# Patient Record
Sex: FEMALE | Race: OTHER | Employment: FULL TIME | URBAN - NONMETROPOLITAN AREA
[De-identification: names, ages, dates, MRNs, and addresses within clinical notes are randomized per-mention and may not be internally consistent; named-entity substitution may affect disease eponyms.]

---

## 2017-03-10 ENCOUNTER — OFFICE VISIT (OUTPATIENT)
Dept: URGENT CARE | Age: 27
End: 2017-03-10
Payer: MEDICAID

## 2017-03-10 VITALS
OXYGEN SATURATION: 98 % | WEIGHT: 225 LBS | DIASTOLIC BLOOD PRESSURE: 80 MMHG | RESPIRATION RATE: 20 BRPM | SYSTOLIC BLOOD PRESSURE: 119 MMHG | BODY MASS INDEX: 36.32 KG/M2 | HEART RATE: 73 BPM | TEMPERATURE: 97.6 F

## 2017-03-10 DIAGNOSIS — K52.9 GASTROENTERITIS: Primary | ICD-10-CM

## 2017-03-10 PROCEDURE — 99213 OFFICE O/P EST LOW 20 MIN: CPT | Performed by: NURSE PRACTITIONER

## 2017-03-10 RX ORDER — DICYCLOMINE HYDROCHLORIDE 10 MG/1
10 CAPSULE ORAL 3 TIMES DAILY PRN
Qty: 120 CAPSULE | Refills: 3 | Status: SHIPPED | OUTPATIENT
Start: 2017-03-10 | End: 2019-05-26 | Stop reason: ALTCHOICE

## 2017-03-10 RX ORDER — FLUOXETINE HYDROCHLORIDE 20 MG/1
CAPSULE ORAL
Status: ON HOLD | COMMUNITY
Start: 2017-02-28 | End: 2019-06-10 | Stop reason: HOSPADM

## 2017-03-10 RX ORDER — ONDANSETRON 2 MG/ML
4 INJECTION INTRAMUSCULAR; INTRAVENOUS ONCE
Status: COMPLETED | OUTPATIENT
Start: 2017-03-10 | End: 2017-03-10

## 2017-03-10 RX ORDER — ONDANSETRON HYDROCHLORIDE 8 MG/1
8 TABLET, FILM COATED ORAL EVERY 8 HOURS PRN
Qty: 9 TABLET | Refills: 1 | Status: SHIPPED | OUTPATIENT
Start: 2017-03-10 | End: 2017-03-13

## 2017-03-10 RX ADMIN — ONDANSETRON 4 MG: 2 INJECTION INTRAMUSCULAR; INTRAVENOUS at 09:42

## 2017-03-10 ASSESSMENT — ENCOUNTER SYMPTOMS
NAUSEA: 1
DIARRHEA: 1
SORE THROAT: 1
ABDOMINAL PAIN: 1
COUGH: 1
VOMITING: 1

## 2017-04-29 ENCOUNTER — HOSPITAL ENCOUNTER (EMERGENCY)
Facility: HOSPITAL | Age: 27
Discharge: HOME | End: 2017-04-30
Attending: EMERGENCY MEDICINE | Admitting: EMERGENCY MEDICINE

## 2017-04-29 DIAGNOSIS — K52.9 GASTROENTERITIS: Primary | ICD-10-CM

## 2017-04-29 DIAGNOSIS — N39.0 ACUTE UTI (URINARY TRACT INFECTION): ICD-10-CM

## 2017-04-29 PROCEDURE — 99283 EMERGENCY DEPT VISIT LOW MDM: CPT

## 2017-04-29 RX ORDER — FLUOXETINE HYDROCHLORIDE 20 MG/1
40 CAPSULE ORAL
COMMUNITY
Start: 2017-02-28

## 2017-04-30 VITALS
WEIGHT: 220 LBS | HEART RATE: 67 BPM | HEIGHT: 67 IN | SYSTOLIC BLOOD PRESSURE: 101 MMHG | OXYGEN SATURATION: 99 % | BODY MASS INDEX: 34.53 KG/M2 | RESPIRATION RATE: 17 BRPM | DIASTOLIC BLOOD PRESSURE: 68 MMHG | TEMPERATURE: 98 F

## 2017-04-30 LAB
ALBUMIN SERPL-MCNC: 4.5 G/DL (ref 3.5–5)
ALBUMIN/GLOB SERPL: 1.1 G/DL (ref 1.1–2.5)
ALP SERPL-CCNC: 79 U/L (ref 24–120)
ALT SERPL W P-5'-P-CCNC: 19 U/L (ref 0–54)
ANION GAP SERPL CALCULATED.3IONS-SCNC: 15 MMOL/L (ref 4–13)
AST SERPL-CCNC: 24 U/L (ref 7–45)
BACTERIA UR QL AUTO: ABNORMAL /HPF
BASOPHILS # BLD AUTO: 0.02 10*3/MM3 (ref 0–0.2)
BASOPHILS NFR BLD AUTO: 0.2 % (ref 0–2)
BILIRUB SERPL-MCNC: 0.6 MG/DL (ref 0.1–1)
BILIRUB UR QL STRIP: NEGATIVE
BUN BLD-MCNC: 14 MG/DL (ref 5–21)
BUN/CREAT SERPL: 16.9 (ref 7–25)
CALCIUM SPEC-SCNC: 9.4 MG/DL (ref 8.4–10.4)
CHLORIDE SERPL-SCNC: 106 MMOL/L (ref 98–110)
CLARITY UR: CLEAR
CO2 SERPL-SCNC: 20 MMOL/L (ref 24–31)
COLOR UR: ABNORMAL
CREAT BLD-MCNC: 0.83 MG/DL (ref 0.5–1.4)
DEPRECATED RDW RBC AUTO: 42.3 FL (ref 40–54)
EOSINOPHIL # BLD AUTO: 0.35 10*3/MM3 (ref 0–0.7)
EOSINOPHIL NFR BLD AUTO: 3.2 % (ref 0–4)
ERYTHROCYTE [DISTWIDTH] IN BLOOD BY AUTOMATED COUNT: 14.6 % (ref 12–15)
FLUAV AG NPH QL: NEGATIVE
FLUBV AG NPH QL IA: NEGATIVE
GFR SERPL CREATININE-BSD FRML MDRD: 84 ML/MIN/1.73
GLOBULIN UR ELPH-MCNC: 4 GM/DL
GLUCOSE BLD-MCNC: 94 MG/DL (ref 70–100)
GLUCOSE UR STRIP-MCNC: NEGATIVE MG/DL
HCG SERPL QL: NEGATIVE
HCT VFR BLD AUTO: 38 % (ref 37–47)
HGB BLD-MCNC: 12.9 G/DL (ref 12–16)
HGB UR QL STRIP.AUTO: NEGATIVE
HYALINE CASTS UR QL AUTO: ABNORMAL /LPF
IMM GRANULOCYTES # BLD: 0.02 10*3/MM3 (ref 0–0.03)
IMM GRANULOCYTES NFR BLD: 0.2 % (ref 0–5)
KETONES UR QL STRIP: ABNORMAL
LEUKOCYTE ESTERASE UR QL STRIP.AUTO: ABNORMAL
LYMPHOCYTES # BLD AUTO: 1.95 10*3/MM3 (ref 0.72–4.86)
LYMPHOCYTES NFR BLD AUTO: 17.6 % (ref 15–45)
MCH RBC QN AUTO: 27.2 PG (ref 28–32)
MCHC RBC AUTO-ENTMCNC: 33.9 G/DL (ref 33–36)
MCV RBC AUTO: 80 FL (ref 82–98)
MONOCYTES # BLD AUTO: 0.46 10*3/MM3 (ref 0.19–1.3)
MONOCYTES NFR BLD AUTO: 4.2 % (ref 4–12)
NEUTROPHILS # BLD AUTO: 8.26 10*3/MM3 (ref 1.87–8.4)
NEUTROPHILS NFR BLD AUTO: 74.6 % (ref 39–78)
NITRITE UR QL STRIP: NEGATIVE
PH UR STRIP.AUTO: 5.5 [PH] (ref 5–8)
PLATELET # BLD AUTO: 250 10*3/MM3 (ref 130–400)
PMV BLD AUTO: 11.3 FL (ref 6–12)
POTASSIUM BLD-SCNC: 4 MMOL/L (ref 3.5–5.3)
PROT SERPL-MCNC: 8.5 G/DL (ref 6.3–8.7)
PROT UR QL STRIP: NEGATIVE
RBC # BLD AUTO: 4.75 10*6/MM3 (ref 4.2–5.4)
RBC # UR: ABNORMAL /HPF
REF LAB TEST METHOD: ABNORMAL
SODIUM BLD-SCNC: 141 MMOL/L (ref 135–145)
SP GR UR STRIP: >1.03 (ref 1–1.03)
SQUAMOUS #/AREA URNS HPF: ABNORMAL /HPF
UROBILINOGEN UR QL STRIP: ABNORMAL
WBC NRBC COR # BLD: 11.06 10*3/MM3 (ref 4.8–10.8)
WBC UR QL AUTO: ABNORMAL /HPF

## 2017-04-30 PROCEDURE — 96360 HYDRATION IV INFUSION INIT: CPT

## 2017-04-30 PROCEDURE — 85025 COMPLETE CBC W/AUTO DIFF WBC: CPT | Performed by: EMERGENCY MEDICINE

## 2017-04-30 PROCEDURE — 87804 INFLUENZA ASSAY W/OPTIC: CPT | Performed by: EMERGENCY MEDICINE

## 2017-04-30 PROCEDURE — 84703 CHORIONIC GONADOTROPIN ASSAY: CPT | Performed by: EMERGENCY MEDICINE

## 2017-04-30 PROCEDURE — 80053 COMPREHEN METABOLIC PANEL: CPT | Performed by: EMERGENCY MEDICINE

## 2017-04-30 PROCEDURE — 81001 URINALYSIS AUTO W/SCOPE: CPT | Performed by: EMERGENCY MEDICINE

## 2017-04-30 PROCEDURE — 87086 URINE CULTURE/COLONY COUNT: CPT | Performed by: EMERGENCY MEDICINE

## 2017-04-30 RX ORDER — ONDANSETRON 4 MG/1
4 TABLET, FILM COATED ORAL EVERY 6 HOURS
Qty: 15 TABLET | Refills: 0 | Status: SHIPPED | OUTPATIENT
Start: 2017-04-30 | End: 2017-06-19

## 2017-04-30 RX ORDER — CIPROFLOXACIN 250 MG/1
250 TABLET, FILM COATED ORAL 2 TIMES DAILY
Qty: 6 TABLET | Refills: 0 | Status: SHIPPED | OUTPATIENT
Start: 2017-04-30 | End: 2017-05-03

## 2017-04-30 RX ADMIN — SODIUM CHLORIDE 1000 ML: 9 INJECTION, SOLUTION INTRAVENOUS at 01:40

## 2017-05-02 LAB — BACTERIA SPEC AEROBE CULT: ABNORMAL

## 2017-07-20 ENCOUNTER — HOSPITAL ENCOUNTER (OUTPATIENT)
Dept: PHYSICAL THERAPY | Facility: HOSPITAL | Age: 27
Setting detail: THERAPIES SERIES
Discharge: HOME | End: 2017-07-20

## 2017-07-20 DIAGNOSIS — M25.511 ACUTE PAIN OF RIGHT SHOULDER: ICD-10-CM

## 2017-07-20 DIAGNOSIS — S46.911A RIGHT SHOULDER STRAIN, INITIAL ENCOUNTER: Primary | ICD-10-CM

## 2017-07-20 PROCEDURE — 97161 PT EVAL LOW COMPLEX 20 MIN: CPT | Performed by: PHYSICAL THERAPIST

## 2017-07-20 NOTE — THERAPY EVALUATION
Outpatient Physical Therapy Ortho Initial Evaluation  [facility]     Patient Name: Allyssa Holden  : 1990  MRN: [id number]  Today's Date: 2017      Visit Date: 2017    There is no problem list on file for this patient.       Past Medical History:   Diagnosis Date   • ADD (attention deficit disorder)    • Anxiety    • Dementia    • Hypertension    • PTSD (post-traumatic stress disorder)         Past Surgical History:   Procedure Laterality Date   • APPENDECTOMY     •  SECTION         Visit Dx:     ICD-10-CM ICD-9-CM   1. Right shoulder strain, initial encounter S46.911A 840.9   2. Acute pain of right shoulder M25.511 719.41             Patient History       17 1025          History    Chief Complaint Pain;Joint stiffness  -KR      Type of Pain Shoulder pain   Right  -KR      Date Current Problem(s) Began 17  -KR      Brief Description of Current Complaint She reports this is the 2nd time she has hurt her R shoulder; went thru PT for 6 weeks and had MRI, but didn't hear the results. Had intermittent issues. She reports a car had broke down in the her work parking lot, she helped push the car and hurt her shoulder. She reports going to the MD the next day. She reports R shoulder pain right around the joint. She reports trying to move her arm, and they gave her a sling to wear, but cause a heat rash on her stomach. She reports doing exercises from last PT.   -KR      Previous treatment for THIS PROBLEM Rehabilitation  -KR      Patient/Caregiver Goals Relieve pain;Return to prior level of function;Improve mobility  -KR      Patient's Rating of General Health Good  -KR      Hand Dominance right-handed  -KR      Occupation/sports/leisure activities  at [employer] station  -KR      How has patient tried to help current problem? ice/heat/exercises, movement  -KR      What clinical tests have you had for this problem? --   prior MRI over 1 year ago  -KR      Results  of Clinical Tests unknown  -KR      Are you or can you be pregnant No  -KR      Pain     Pain Location Shoulder  -KR      Pain at Present 5;4  -KR      Pain at Best 2;3  -KR      Pain at Worst 8  -KR      Pain Frequency Constant/continuous  -KR      What Performance Factors Make the Current Problem(s) WORSE? lifting, reaching  -KR      What Performance Factors Make the Current Problem(s) BETTER? ice/heat/rest  -KR      Is your sleep disturbed? No  -KR      What position do you sleep in? 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   -KR      Difficulties with ADL's? liting arm, dressing, bathing; lifting children 25#  -KR      Fall Risk Assessment    Any falls in the past year: No  -KR      Services    Prior Rehab/Home Health Experiences Yes  -KR      When was the prior experience with Rehab/Home Health Jan. 2016  -KR      Where was the prior experience with Rehab/Home Health [facility]  -KR      Daily Activities    Are you able to read Yes  -KR      Are you able to write Yes  -KR      Teaching needs identified Home Exercise Program;Management of Condition  -KR      Does patient have problems with the following? Depression;Anxiety  -KR      Pt Participated in POC and Goals Yes  -KR      Safety    Are you being hurt, hit, or frightened by anyone at home or in your life? No  -KR      Are you being neglected by a caregiver No  -KR        User Key  (r) = Recorded By, (t) = Taken By, (c) = Cosigned By    Initials Name Provider Type    ROD Ross, PT DPT Physical Therapist                PT Ortho       07/20/17 1025    Posture/Observations    Alignment Options Thoracic kyphosis;Cervical lordosis;Rounded shoulders;Forward head  -KR    Forward Head Bilateral:;Increased  -KR    Thoracic Kyphosis --   upper increased; middle decreased  -KR    Rounded Shoulders Bilateral:;Moderate;Severe;Increased  -KR    Sensation    Light Touch No apparent deficits  -KR     Quarter Clearing    Quarter Clearing Upper Quarter Clearing  -KR    Sensory Screen for Light Touch- Upper Quarter Clearing    C4 (posterior shoulder) Bilateral:;Intact  -KR    C5 (lateral upper arm) Bilateral:;Intact  -KR    C6 (tip of thumb) Bilateral:;Intact  -KR    C7 (tip of 3rd finger) Bilateral:;Intact  -KR    C8 (tip of 5th finger) Bilateral:;Intact  -KR    T1 (medial lower arm) Bilateral:;Intact  -KR    Myotomal Screen- Upper Quarter Clearing    Shoulder flexion (C5) Left:;WNL;Right:;Unable to assess  -KR    Elbow flexion/wrist extension (C6) Bilateral:;WNL   pain R  -KR    Elbow extension/wrist flexion (C7) Bilateral:;WNL   pain R   -KR    Finger flexion/ (C8) Bilateral:;WNL  -KR    Finger abduction (T1) Bilateral:;WNL  -KR     Bilateral:;WNL  -KR    Cervical/Shoulder ROM Screen    Cervical flexion Normal  -KR    Cervical extension Normal  -KR    Cervical rotation Normal  -KR    Cervical quadrant (Spurling's) Normal  -KR    Shoulder elevation  Impaired   R  -KR    Special Tests/Palpation    Special Tests/Palpation Cervical/Thoracic;Shoulder  -KR    Cervical Palpation    Cervical Palpation- Location? Levator scapula;Upper traps;Middle traps;Rhomboids;Lower traps  -KR    Levator Scapula Tender;Guarded/taut;Right:  -KR    Upper Traps Tender;Guarded/taut;Right:  -KR    Middle Traps Right:;Tender;Guarded/taut  -KR    Rhomboids Right:;Tender;Guarded/taut  -KR    Lower Traps Right:;Tender;Guarded/taut  -KR    Thoracic Accessory Motions    Thoracic Accessory Motions Tested? Yes  -KR    Pa glide- Upper thoracic Center:;Hypomobile;Right pain   R>L  -KR    Pa glide- Middle thoracic Center:;Hypomobile;Right pain   R>L  -KR    Shoulder Girdle Palpation    Shoulder Girdle Palpation? Yes   patient reported tenderness throughout  -KR    Shoulder Girdle Accessory Motions    Shoulder Girdle Accessory Motions Tested? Yes  -KR    Posterior glide of humerus Right:;Hypomobile;Right pain  -KR    Inferior glide of humerus  Right:;Hypomobile;Right pain  -KR    Shoulder Laxity/Instability Special Tests    Sulcus Sign, 0 Degrees Bilateral:;Negative  -KR    ROM (Range of Motion)    General ROM --   unable to reach behind head or behind back  -KR    General ROM Detail Shoulder Flexion: 95 sitting, 59 supine; ER with arm at side 27   limited by pain; PROM limited by pain as well.   -KR      User Key  (r) = Recorded By, (t) = Taken By, (c) = Cosigned By    Initials Name Provider Type    ROD Ross PT DPT Physical Therapist                            Therapy Education       07/20/17 1025          Therapy Education    Given HEP;Symptoms/condition management;Posture/body mechanics   wand flexion, wall walks- from prior therapy  -KR      Program New   educated on pain free and proper technique.   -KR      How Provided Verbal;Demonstration;Written  -KR      Provided to Patient  -KR      Level of Understanding Teach back education performed;Verbalized;Demonstrated  -KR        User Key  (r) = Recorded By, (t) = Taken By, (c) = Cosigned By    Initials Name Provider Type    ROD Ross PT DPT Physical Therapist                PT OP Goals       07/20/17 1025       PT Short Term Goals    STG Date to Achieve 08/10/17  -KR     STG 1 Pt will report no pain greater than 3-4/10 with ADLs.  -KR     STG 1 Progress New  -KR     STG 2 Pt will demonstrated 120 or greater shouldler AROM in supine.   -KR     STG 2 Progress New  -KR     STG 3 Pt will score 30 or less on Quick Dash  -KR     STG 3 Progress New  -KR     Long Term Goals    LTG Date to Achieve 08/31/17  -KR     LTG 1 Pt will be able to reach behind head and back for hygene and other ADLs.   -KR     LTG 1 Progress New  -KR     LTG 2 Pt will have 120 or greater AROM shoulder flexion in sitting to improve reaching with household and work activities.   -KR     LTG 2 Progress New  -KR     LTG 3 Pt will have 4+/5 shoulder strength, globally in order to litft children ad perfrom other  household and work activities.   -KR     LTG 3 Progress New  -KR     LTG 4 Pt will score 10 or less on Quick Dash   -KR     LTG 4 Progress New  -KR     Time Calculation    PT Goal Re-Cert Due Date 08/18/17  -KR       User Key  (r) = Recorded By, (t) = Taken By, (c) = Cosigned By    Initials Name Provider Type    ROD Ross, PT DPT Physical Therapist                PT Assessment/Plan       07/20/17 1025       PT Assessment    Functional Limitations Limitations in community activities;Limitation in home management;Performance in self-care ADL;Performance in leisure activities;Performance in work activities  -KR     Impairments Pain;Muscle strength;Posture;Joint mobility;Range of motion  -KR     Assessment Comments Allyssa presents with over year history of right shoulder pain and difficulty using, but most recent injury on June 18 while pushing a car. She currently has very limited shoulder motion secondary to pain. We were unable to assess special tests and strength secondary to patient pain tolerance and lack of motion. She does have increased rounded shoulders and increased upper thoracic kyphosis contributing to dysfuntion. She is limited with reaching, household and work activities, but reports doing most things with her lleft arm now. She does tend to hold R arm in a guarde position, close to her body. She should progress well, but may take some time.   -KR     Please refer to paper survey for additional self-reported information Yes  -KR     Rehab Potential Good  -KR     Patient/caregiver participated in establishment of treatment plan and goals Yes  -KR     Patient would benefit from skilled therapy intervention Yes  -KR     PT Plan    PT Frequency 2x/week;3x/week  -KR     Predicted Duration of Therapy Intervention (days/wks) 4-6 eeks  -KR     Planned CPT's? PT EVAL LOW COMPLEXITY: 25883;PT THER ACT EA 15 MIN: 13810;PT THER PROC EA 15 MIN: 22138;PT MANUAL THERAPY EA 15 MIN: 28474;PT NEUROMUSC  RE-EDUCATION EA 15 MIN: 63827;PT ELECTRICAL STIM UNATTEND: ;PT ELECTRICAL STIM ATTD EA 15 MIN: 81389  -KR     PT Plan Comments We will start by working to improve her motion, then progress with postural and strengnthening activiites. We will use taping and modalities as needed to decrease pain and improve mobiltiy.   -KR       User Key  (r) = Recorded By, (t) = Taken By, (c) = Cosigned By    Initials Name Provider Type    ROD Ross, PT DPT Physical Therapist                                    Outcome Measures       07/20/17 1025          Quick DASH    Open a tight or new jar. 2  -KR      Do heavy household chores (e.g., wash walls, wash floors) 3  -KR      Carry a shopping bag or briefcase 3  -KR      Wash your back 4  -KR      Use a knife to cut food 1  -KR      Recreational activities in which you take some force or impact through your arm, should or hand (e.g. golf, hammering, tennis, etc.) 4  -KR      During the past week, to what extent has your arm, shoulder, or hand problem interfered with your normal social activites with family, friends, neighbors or groups? 3  -KR      During the past week, were you limited in your work or other regular daily activities as a result of your arm, shoulder or hand problem? 3  -KR      Arm, Shoulder, or hand pain 3  -KR      Tingling (pins and needles) in your arm, shoulder, or hand 3  -KR      During the past week, how much difficulty have you had sleeping because of the pain in your arm, shoulder or hand? 2  -KR      Number of Questions Answered 11  -KR      Quick DASH Score 45.45  -KR      Work Module (Optional)    Using your usual technique for your work? 3  -KR      Doing your usual work because of arm, shoulder or hand pain? 3  -KR      Doing your work as well as you would like? 3  -KR      Spending your usual amount of time doing your work? 3  -KR      Work Module Score 50  -KR      Functional Assessment    Outcome Measure Options Quick DASH  -KR         User Key  (r) = Recorded By, (t) = Taken By, (c) = Cosigned By    Initials Name Provider Type    ROD Ross, PT DPT Physical Therapist            Time Calculation:   Start Time: 1020  Stop Time: 1115  Time Calculation (min): 55 min  Total Timed Code Minutes- PT: 0 minute(s)     Therapy Charges for Today     Code Description Service Date Service Provider Modifiers Qty    83658638561 HC PT EVAL LOW COMPLEXITY 4 7/20/2017 Danyell Ross, PT DPT GP 1          PT G-Codes  Outcome Measure Options: Quick DASH         Danyell Ross, PT DPT  7/20/2017

## 2017-07-24 ENCOUNTER — HOSPITAL ENCOUNTER (OUTPATIENT)
Dept: PHYSICAL THERAPY | Facility: HOSPITAL | Age: 27
Setting detail: THERAPIES SERIES
Discharge: HOME | End: 2017-07-24

## 2017-07-24 DIAGNOSIS — M25.511 ACUTE PAIN OF RIGHT SHOULDER: ICD-10-CM

## 2017-07-24 DIAGNOSIS — S46.911A RIGHT SHOULDER STRAIN, INITIAL ENCOUNTER: Primary | ICD-10-CM

## 2017-07-24 PROCEDURE — 97110 THERAPEUTIC EXERCISES: CPT | Performed by: PHYSICAL THERAPIST

## 2017-07-24 PROCEDURE — 97140 MANUAL THERAPY 1/> REGIONS: CPT | Performed by: PHYSICAL THERAPIST

## 2017-07-24 NOTE — THERAPY TREATMENT NOTE
Outpatient Physical Therapy Ortho Treatment Note  [facility]     Patient Name: Allyssa Holden  : 1990  MRN: [id number]  Today's Date: 2017      Visit Date: 2017    Visit Dx:    ICD-10-CM ICD-9-CM   1. Right shoulder strain, initial encounter S46.911A 840.9   2. Acute pain of right shoulder M25.511 719.41       There is no problem list on file for this patient.       Past Medical History:   Diagnosis Date   • ADD (attention deficit disorder)    • Anxiety    • Dementia    • Hypertension    • PTSD (post-traumatic stress disorder)         Past Surgical History:   Procedure Laterality Date   • APPENDECTOMY     •  SECTION                               PT Assessment/Plan       17 0908       PT Assessment    Assessment Comments No goals  met, this is her first visit since initial evaluation. I did have to remind her to not walk with her arm guarded. She is still limited with shoulder mobiltiy.   -KR     PT Plan    PT Plan Comments Continue working GH, thoracic and soft tissue mobiltiy. Progressing with postural activities as tolerated.   -KR       User Key  (r) = Recorded By, (t) = Taken By, (c) = Cosigned By    Initials Name Provider Type    ROD Ross, PT DPT Physical Therapist                    Exercises       17 0908          Subjective Comments    Subjective Comments She reports had a very busy weekend at work. She reports stretching arm across body and that helps to decrease pain. 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 She reports some pulling in the front of her chest when using.   -KR      Subjective Pain    Able to rate subjective pain? yes  -KR      Pre-Treatment Pain Level 4   or 5  -KR      Post-Treatment Pain Level 4   or 5  -KR      Exercise 1    Exercise Name 1 discussed arm positions/guarding  -KR      Exercise 2    Exercise Name 2 wand flexion  -KR      Reps 2 10  -KR      Exercise 3    Exercise  Name 3 wand ER  -KR      Reps 3 10  -KR        User Key  (r) = Recorded By, (t) = Taken By, (c) = Cosigned By    Initials Name Provider Type    ROD Ross, PT DPT Physical Therapist                        Manual Rx (last 36 hours)      Manual Treatments       07/24/17 0908          Manual Rx 1    Manual Rx 1 Location prone thoracic UPAs  -KR      Manual Rx 1 Grade 1/2  -KR      Manual Rx 1 Duration 5  -KR      Manual Rx 2    Manual Rx 2 Location prone STM to parascapular and UT/LS   -KR      Manual Rx 2 Grade light  -KR      Manual Rx 2 Duration 8  -KR      Manual Rx 3    Manual Rx 3 Location supine GH post/inf mobilizations  -KR      Manual Rx 3 Grade 1/2  -KR      Manual Rx 3 Duration 4  -KR      Manual Rx 4    Manual Rx 4 Location supine STM to bicep, deltoid and pec  -KR      Manual Rx 4 Grade light  -KR      Manual Rx 4 Duration 8  -KR        User Key  (r) = Recorded By, (t) = Taken By, (c) = Cosigned By    Initials Name Provider Type    ROD Ross, PT DPT Physical Therapist                PT OP Goals       07/24/17 0908       PT Short Term Goals    STG Date to Achieve 08/10/17  -KR     STG 1 Pt will report no pain greater than 3-4/10 with ADLs.  -KR     STG 1 Progress Ongoing  -KR     STG 1 Progress Comments 4-5/10 today at rest  -KR     STG 2 Pt will demonstrated 120 or greater shouldler AROM in supine.   -KR     STG 2 Progress Ongoing  -KR     STG 3 Pt will score 30 or less on Quick Dash  -KR     STG 3 Progress Ongoing  -KR     Long Term Goals    LTG Date to Achieve 08/31/17  -KR     LTG 1 Pt will be able to reach behind head and back for hygene and other ADLs.   -KR     LTG 1 Progress Ongoing  -KR     LTG 2 Pt will have 120 or greater AROM shoulder flexion in sitting to improve reaching with household and work activities.   -KR     LTG 2 Progress Ongoing  -KR     LTG 3 Pt will have 4+/5 shoulder strength, globally in order to litft children ad perfrom other household and work  activities.   -KR     LTG 3 Progress Ongoing  -KR     LTG 4 Pt will score 10 or less on Quick Dash   -KR     LTG 4 Progress Ongoing  -KR     Time Calculation    PT Goal Re-Cert Due Date 08/18/17  -KR       User Key  (r) = Recorded By, (t) = Taken By, (c) = Cosigned By    Initials Name Provider Type    ROD Ross PT DPT Physical Therapist                Therapy Education       07/24/17 0908          Therapy Education    Given HEP;Symptoms/condition management  -KR      Program Reinforced  -KR      How Provided Verbal  -KR      Provided to Patient  -KR      Level of Understanding Verbalized  -KR        User Key  (r) = Recorded By, (t) = Taken By, (c) = Cosigned By    Initials Name Provider Type    ROD Ross PT DPT Physical Therapist                Time Calculation:   Start Time: 0908  Stop Time: 0951  Time Calculation (min): 43 min  Total Timed Code Minutes- PT: 43 minute(s)    Therapy Charges for Today     Code Description Service Date Service Provider Modifiers Qty    51098976501 HC PT THER PROC EA 15 MIN 7/24/2017 Danyell Ross, PT DPT GP 1    65113482491 HC PT MANUAL THERAPY EA 15 MIN 7/24/2017 Danyell Ross PT DPT GP 2                    Danyell Ross, PT DPT  7/24/2017

## 2017-07-31 ENCOUNTER — HOSPITAL ENCOUNTER (OUTPATIENT)
Dept: PHYSICAL THERAPY | Facility: HOSPITAL | Age: 27
Setting detail: THERAPIES SERIES
Discharge: HOME | End: 2017-07-31

## 2017-07-31 DIAGNOSIS — M25.511 ACUTE PAIN OF RIGHT SHOULDER: ICD-10-CM

## 2017-07-31 DIAGNOSIS — S46.911A RIGHT SHOULDER STRAIN, INITIAL ENCOUNTER: Primary | ICD-10-CM

## 2017-07-31 PROCEDURE — 97140 MANUAL THERAPY 1/> REGIONS: CPT

## 2017-08-02 ENCOUNTER — HOSPITAL ENCOUNTER (OUTPATIENT)
Dept: PHYSICAL THERAPY | Facility: HOSPITAL | Age: 27
Setting detail: THERAPIES SERIES
Discharge: HOME | End: 2017-08-02

## 2017-08-02 DIAGNOSIS — S46.911A RIGHT SHOULDER STRAIN, INITIAL ENCOUNTER: Primary | ICD-10-CM

## 2017-08-02 DIAGNOSIS — M25.511 ACUTE PAIN OF RIGHT SHOULDER: ICD-10-CM

## 2017-08-02 PROCEDURE — 97140 MANUAL THERAPY 1/> REGIONS: CPT

## 2017-08-02 NOTE — THERAPY TREATMENT NOTE
Outpatient Physical Therapy Ortho Treatment Note  [facility]     Patient Name: Allyssa Holden  : 1990  MRN: [id number]  Today's Date: 2017      Visit Date: 2017    Visit Dx:    ICD-10-CM ICD-9-CM   1. Right shoulder strain, initial encounter S46.911A 840.9   2. Acute pain of right shoulder M25.511 719.41       There is no problem list on file for this patient.       Past Medical History:   Diagnosis Date   • ADD (attention deficit disorder)    • Anxiety    • Dementia    • Hypertension    • PTSD (post-traumatic stress disorder)         Past Surgical History:   Procedure Laterality Date   • APPENDECTOMY     •  SECTION                               PT Assessment/Plan       17 1400       PT Assessment    Assessment Comments The patient does rest into scapular downward rotation.  PROM did improve today after the guarding decreased, as well as after the therapist provided education on shoulder mechanics (tissue slacking with overhead motion).  There is a noted component of humeral anterior glide that was addressed today with the taping.  Patient was educated about the importance of avoiding joint stiffness with the lack of motion.  -RS     PT Plan    PT Plan Comments Address scapular movement faults and assess the effects of tape.  continue with painfree motion to address guarding.  -RS       User Key  (r) = Recorded By, (t) = Taken By, (c) = Cosigned By    Initials Name Provider Type    RS Neel Lee, PT DPT Physical Therapist                    Exercises       17 1300          Subjective Comments    Subjective Comments Patient is about the same.  She is still having pain when reaching behind her back and overhead.  HEP is going well  -RS      Subjective Pain    Able to rate subjective pain? yes  -RS      Pre-Treatment Pain Level 3  -RS      Post-Treatment Pain Level 3  -RS      Exercise 1    Exercise Name 1 humeral anterior glide taping using sureprep, cover roll,  and leukotape with slight flexion bias in open packed.  -RS        User Key  (r) = Recorded By, (t) = Taken By, (c) = Cosigned By    Initials Name Provider Type    RS Neel Lee, PT DPT Physical Therapist                        Manual Rx (last 36 hours)      Manual Treatments       08/02/17 1400          Manual Rx 1    Manual Rx 1 Location prone upper thoracic   -RS      Manual Rx 1 Type extension  mobililzation  -RS      Manual Rx 1 Grade 2 oscillatory  -RS      Manual Rx 1 Duration 4  -RS      Manual Rx 2    Manual Rx 2 Location ER repetition painfree motion (R) in supine  -RS      Manual Rx 2 Duration 4 (towel roll under elbow)  -RS      Manual Rx 3    Manual Rx 3 Location supine (R) GHJ lateral distraction and post/inf mobilizations  -RS      Manual Rx 3 Type oscillatory  -RS      Manual Rx 3 Grade 1/2  -RS      Manual Rx 3 Duration 9   3 on each  -RS      Manual Rx 4    Manual Rx 4 Location PROM (R) shoulder ER in 30 deg abd  -RS      Manual Rx 4 Type repetition  -RS      Manual Rx 4 Duration 5  -RS      Manual Rx 5    Manual Rx 5 Location PROM R shoulder flexion in scapular plane  -RS      Manual Rx 5 Duration 10  -RS      Manual Rx 6    Manual Rx 6 Location PROM (R) shoulder abduction avoiding extension to midline   light inferior oscillations given prior to onset of guarding  -RS      Manual Rx 6 Type repetition  -RS      Manual Rx 6 Duration 6  -RS        User Key  (r) = Recorded By, (t) = Taken By, (c) = Cosigned By    Initials Name Provider Type    RS Neel Lee, PT DPT Physical Therapist                PT OP Goals       08/02/17 1400       PT Short Term Goals    STG Date to Achieve 08/10/17  -RS     STG 1 Pt will report no pain greater than 3-4/10 with ADLs.  -RS     STG 1 Progress Ongoing  -RS     STG 2 Pt will demonstrated 120 or greater shouldler AROM in supine.   -RS     STG 2 Progress Progressing  -RS     STG 2 Progress Comments PROM 130-135 today once guarding decreased  -RS      STG 3 Pt will score 30 or less on Quick Dash  -RS     STG 3 Progress Ongoing  -RS     Long Term Goals    LTG Date to Achieve 08/31/17  -RS     LTG 1 Pt will be able to reach behind head and back for hygene and other ADLs.   -RS     LTG 1 Progress Ongoing  -RS     LTG 2 Pt will have 120 or greater AROM shoulder flexion in sitting to improve reaching with household and work activities.   -RS     LTG 2 Progress Ongoing  -RS     LTG 3 Pt will have 4+/5 shoulder strength, globally in order to litft children ad perfrom other household and work activities.   -RS     LTG 3 Progress Ongoing  -RS     LTG 4 Pt will score 10 or less on Quick Dash   -RS     LTG 4 Progress Ongoing  -RS     Time Calculation    PT Goal Re-Cert Due Date 08/18/17  -RS       User Key  (r) = Recorded By, (t) = Taken By, (c) = Cosigned By    Initials Name Provider Type    RS Neel Lee, PT DPT Physical Therapist                Therapy Education       08/02/17 1400          Therapy Education    Education Details CKC place and hold shoulder flexion on wall 5-10x/day  -RS      Given HEP;Symptoms/condition management  -RS      Program New  -RS      How Provided Demonstration  -RS      Provided to Patient  -RS      Level of Understanding Demonstrated;Verbalized  -RS        User Key  (r) = Recorded By, (t) = Taken By, (c) = Cosigned By    Initials Name Provider Type    RS Neel Lee, PT DPT Physical Therapist                Time Calculation:   Start Time: 1300  Stop Time: 1345  Time Calculation (min): 45 min  PT Non-Billable Time (min): 4 min  Total Timed Code Minutes- PT: 41 minute(s)    Therapy Charges for Today     Code Description Service Date Service Provider Modifiers Qty    16418797234 HC PT MANUAL THERAPY EA 15 MIN 8/2/2017 Neel Lee, PT DPT GP 3                    HARDY Lee, PT DPT  8/2/2017

## 2017-08-07 ENCOUNTER — HOSPITAL ENCOUNTER (OUTPATIENT)
Dept: PHYSICAL THERAPY | Facility: HOSPITAL | Age: 27
Setting detail: THERAPIES SERIES
Discharge: HOME | End: 2017-08-07

## 2017-08-07 DIAGNOSIS — M25.511 ACUTE PAIN OF RIGHT SHOULDER: ICD-10-CM

## 2017-08-07 DIAGNOSIS — S46.911A RIGHT SHOULDER STRAIN, INITIAL ENCOUNTER: Primary | ICD-10-CM

## 2017-08-07 PROCEDURE — 97140 MANUAL THERAPY 1/> REGIONS: CPT

## 2017-08-07 PROCEDURE — 97110 THERAPEUTIC EXERCISES: CPT

## 2017-08-07 NOTE — THERAPY TREATMENT NOTE
Outpatient Physical Therapy Ortho Treatment Note  [facility]     Patient Name: Allyssa Holden  : 1990  MRN: [id number]  Today's Date: 2017      Visit Date: 2017    Visit Dx:    ICD-10-CM ICD-9-CM   1. Right shoulder strain, initial encounter S46.911A 840.9   2. Acute pain of right shoulder M25.511 719.41       There is no problem list on file for this patient.       Past Medical History:   Diagnosis Date   • ADD (attention deficit disorder)    • Anxiety    • Dementia    • Hypertension    • PTSD (post-traumatic stress disorder)         Past Surgical History:   Procedure Laterality Date   • APPENDECTOMY     •  SECTION                               PT Assessment/Plan       17 6716       PT Assessment    Assessment Comments Her PROM continues to improve post manual techniques and she was painfree with lower level proprioception activities today. Her humeral head continues to be placed anteriorly and she demonstrates HH glide superiorly with overhead activity. She continues to require education.    -TC     PT Plan    PT Plan Comments Continue to address her GHJ hypomobility and guarding to the scapular and shoulder region on the R. She responded well to the taping technique and therefore it was reapplied today, reassess. Continue to promote painfree motion with assessment of her scapular movement next session.   -TC       User Key  (r) = Recorded By, (t) = Taken By, (c) = Cosigned By    Initials Name Provider Type    TC Yen Child, PT DPT Physical Therapist                    Exercises       17 1105          Subjective Comments    Subjective Comments Her right shoulder is still about the same. More range upward but reaching across is still painful and behind. Sweeping and reaching upward at work still aggravating.   -TC      Subjective Pain    Able to rate subjective pain? yes  -TC      Pre-Treatment Pain Level 3  -TC      Exercise 1    Exercise Name 1 place and holds  at 90, 100, 120 deg shoulder flex in scapular plane painfree; w/o and w/o perturbations   -TC      Cueing 1 Verbal;Tactile;Demo  -TC      Reps 1 2   in each plane  -TC      Time (Seconds) 1 30s holds ea, 5-10s rest   -TC      Exercise 2    Exercise Name 2 wand flexion; for review  -TC      Reps 2 10  -TC      Exercise 3    Exercise Name 3 wand ER for review  -TC      Reps 3 10  -TC      Exercise 4    Exercise Name 4 humeral anterior glide taping using sureprep, cover roll, and leukotape with slight flexion bias in open packed.  -TC      Time (Seconds) 4 3  -TC        User Key  (r) = Recorded By, (t) = Taken By, (c) = Cosigned By    Initials Name Provider Type    TC Yen Child, PT DPT Physical Therapist                        Manual Rx (last 36 hours)      Manual Treatments       08/07/17 1105          Manual Rx 1    Manual Rx 1 Location prone upper thoracic   -TC      Manual Rx 1 Type extension  mobililzation  -TC      Manual Rx 1 Grade 2 oscillatory  -TC      Manual Rx 1 Duration 4  -TC      Manual Rx 2    Manual Rx 2 Location ER repetition painfree motion (R) in supine  -TC      Manual Rx 2 Duration 4 (towel roll under elbow)  -TC      Manual Rx 3    Manual Rx 3 Location supine (R) GHJ lateral distraction and post/inf mobilizations  -TC      Manual Rx 3 Type oscillatory  -TC      Manual Rx 3 Grade 1/2  -TC      Manual Rx 3 Duration 2   3 on each  -TC      Manual Rx 4    Manual Rx 4 Location PROM (R) shoulder ER in 30 deg abd  -TC      Manual Rx 4 Type repetition  -TC      Manual Rx 4 Duration 5  -TC      Manual Rx 5    Manual Rx 5 Location PROM R shoulder flexion in scapular plane  -TC      Manual Rx 5 Duration 10  -TC      Manual Rx 6    Manual Rx 6 Location PROM (R) shoulder abduction avoiding extension to midline   light inferior oscillations given prior to onset of guarding  -TC      Manual Rx 6 Type repetition  -TC      Manual Rx 6 Duration 6  -TC        User Key  (r) = Recorded By, (t) = Taken By,  (c) = Cosigned By    Initials Name Provider Type    TC Yen Child, PT DPT Physical Therapist                PT OP Goals       08/07/17 1105       PT Short Term Goals    STG Date to Achieve 08/10/17  -TC     STG 1 Pt will report no pain greater than 3-4/10 with ADLs.  -TC     STG 1 Progress Ongoing  -TC     STG 1 Progress Comments 3/10 today pre and post R shoulder  -TC     STG 2 Pt will demonstrated 120 or greater shouldler AROM in supine.   -TC     STG 2 Progress Progressing  -TC     STG 2 Progress Comments 110 today painfree passive  -TC     STG 3 Pt will score 30 or less on Quick Dash  -TC     STG 3 Progress Ongoing  -TC     Long Term Goals    LTG Date to Achieve 08/31/17  -TC     LTG 1 Pt will be able to reach behind head and back for hygene and other ADLs.   -TC     LTG 1 Progress Ongoing  -TC     LTG 2 Pt will have 120 or greater AROM shoulder flexion in sitting to improve reaching with household and work activities.   -TC     LTG 2 Progress Ongoing  -TC     LTG 3 Pt will have 4+/5 shoulder strength, globally in order to litft children ad perfrom other household and work activities.   -TC     LTG 3 Progress Ongoing  -TC     LTG 4 Pt will score 10 or less on Quick Dash   -TC     LTG 4 Progress Ongoing  -TC     Time Calculation    PT Goal Re-Cert Due Date 08/18/17  -TC       User Key  (r) = Recorded By, (t) = Taken By, (c) = Cosigned By    Initials Name Provider Type    TC Yen Child, PT DPT Physical Therapist                Therapy Education       08/07/17 9296          Therapy Education    Given HEP;Posture/body mechanics  -TC      Program Reinforced  -TC      How Provided Verbal;Demonstration  -TC      Provided to Patient  -TC      Level of Understanding Teach back education performed;Verbalized;Demonstrated  -TC        User Key  (r) = Recorded By, (t) = Taken By, (c) = Cosigned By    Initials Name Provider Type    KATERINA Child, PT DPT Physical Therapist                Time  Calculation:   Start Time: 1105  Stop Time: 1155  Time Calculation (min): 50 min  PT Non-Billable Time (min): 3 min  Total Timed Code Minutes- PT: 47 minute(s)    Therapy Charges for Today     Code Description Service Date Service Provider Modifiers Qty    42905709946 HC PT MANUAL THERAPY EA 15 MIN 8/7/2017 Yen Child, PT DPT GP 2    93841028810 HC PT THER PROC EA 15 MIN 8/7/2017 Yen Child, PT DPT GP 1                    Yen Child, PT DPT  8/7/2017

## 2017-08-09 ENCOUNTER — APPOINTMENT (OUTPATIENT)
Dept: PHYSICAL THERAPY | Facility: HOSPITAL | Age: 27
End: 2017-08-09

## 2017-08-10 ENCOUNTER — HOSPITAL ENCOUNTER (OUTPATIENT)
Dept: PHYSICAL THERAPY | Facility: HOSPITAL | Age: 27
Setting detail: THERAPIES SERIES
Discharge: HOME | End: 2017-08-10

## 2017-08-10 DIAGNOSIS — M25.511 ACUTE PAIN OF RIGHT SHOULDER: ICD-10-CM

## 2017-08-10 DIAGNOSIS — S46.911A RIGHT SHOULDER STRAIN, INITIAL ENCOUNTER: Primary | ICD-10-CM

## 2017-08-10 PROCEDURE — 97140 MANUAL THERAPY 1/> REGIONS: CPT

## 2017-08-10 PROCEDURE — 97110 THERAPEUTIC EXERCISES: CPT

## 2017-08-10 NOTE — THERAPY TREATMENT NOTE
Outpatient Physical Therapy Ortho Treatment Note  [facility]     Patient Name: Allyssa Holden  : 1990  MRN: [id number]  Today's Date: 8/10/2017      Visit Date: 08/10/2017    Visit Dx:    ICD-10-CM ICD-9-CM   1. Right shoulder strain, initial encounter S46.911A 840.9   2. Acute pain of right shoulder M25.511 719.41       There is no problem list on file for this patient.       Past Medical History:   Diagnosis Date   • ADD (attention deficit disorder)    • Anxiety    • Dementia    • Hypertension    • PTSD (post-traumatic stress disorder)         Past Surgical History:   Procedure Laterality Date   • APPENDECTOMY     •  SECTION                               PT Assessment/Plan       08/10/17 0917       PT Assessment    Assessment Comments Patient has had an increase in pain since she worked last night.  She can not relate a certain activity to the increase in pain.  She is still trying to lift more with the left arm.  She has poor posture today, discussed patient getting a better supportive bra, but she does not have the funds for this right now. She is guarded with movements more so before treatment, slightly better after treatment.  Will try to get back to her exercises next visit if her pain levels are better.   -AL     PT Plan    PT Plan Comments Continue to work on progressing with her exercise program.   -AL       User Key  (r) = Recorded By, (t) = Taken By, (c) = Cosigned By    Initials Name Provider Type    TANNER Trevizo PTA Physical Therapy Assistant                    Exercises       08/10/17 0917 08/10/17 09       Subjective Comments    Subjective Comments She was working yesterday, about mid way through her shift she started hurting.  She did not do any out of the ordinary llifting when the pain started.  She rates her pain at 5/10.  Pain kept her up during the night off and on.   -AL --  -AL     Subjective Pain    Able to rate subjective pain? yes  -AL --  -AL     Pre-Treatment  Pain Level 5  -AL      Post-Treatment Pain Level 4  -AL      Subjective Pain Comment Rates pain 3-4/10 after treatment.  -AL      Exercise 1    Exercise Name 1 Instructed patient to rest the right arm on pillows and try not to guard when ambulating.  Encouraged good posture.  Ease back into HEP working in pain free ranges.  -AL      Exercise 4    Exercise Name 4 Taped right shoulder with patient in neutral position, using sure prep, cover roll, then leuko tape.  Three stips of leuko tape from deltoid area to right upper traps, two strips leuko tape from anterior humerus and ending past posterior humerus.   -AL      Time (Seconds) 4 3  -AL        User Key  (r) = Recorded By, (t) = Taken By, (c) = Cosigned By    Initials Name Provider Type    TANNER Trevizo PTA Physical Therapy Assistant                        Manual Rx (last 36 hours)      Manual Treatments       08/10/17 0917          Manual Rx 1    Manual Rx 1 Location Left sidelying STM to right  upper and mid traps  -AL      Manual Rx 4    Manual Rx 4 Location PROM (R) shoulder ER in 30 deg abd   Started in left sidelying then in supine  -AL      Manual Rx 4 Type repetition  -AL      Manual Rx 4 Duration 10  -AL      Manual Rx 5    Manual Rx 5 Location PROM R shoulder flexion in scapular plane  -AL      Manual Rx 5 Duration 10  -AL      Manual Rx 6    Manual Rx 6 Location PROM (R) shoulder abduction avoiding extension to midline   light inferior oscillations given prior to onset of guarding  -AL      Manual Rx 6 Type repetition  -AL      Manual Rx 6 Duration 4  -AL        User Key  (r) = Recorded By, (t) = Taken By, (c) = Cosigned By    Initials Name Provider Type    TANNER Trevizo PTA Physical Therapy Assistant                PT OP Goals       08/10/17 0917       PT Short Term Goals    STG Date to Achieve 08/10/17  -AL     STG 1 Pt will report no pain greater than 3-4/10 with ADLs.  -AL     STG 1 Progress Ongoing  -AL     STG 1 Progress Comments Pain 5/10  before treatment, 3-4/10 after treatment.  -AL     STG 2 Pt will demonstrated 120 or greater shouldler AROM in supine.   -AL     STG 2 Progress Progressing  -AL     STG 2 Progress Comments Restricted by pain today  -AL     STG 3 Pt will score 30 or less on Quick Dash  -AL     STG 3 Progress Ongoing  -AL     Long Term Goals    LTG Date to Achieve 08/31/17  -AL     LTG 1 Pt will be able to reach behind head and back for hygene and other ADLs.   -AL     LTG 1 Progress Ongoing  -AL     LTG 1 Progress Comments Increase pain today with activities  -AL     LTG 2 Pt will have 120 or greater AROM shoulder flexion in sitting to improve reaching with household and work activities.   -AL     LTG 2 Progress Ongoing  -AL     LTG 3 Pt will have 4+/5 shoulder strength, globally in order to litft children ad perfrom other household and work activities.   -AL     LTG 3 Progress Ongoing  -AL     LTG 4 Pt will score 10 or less on Quick Dash   -AL     LTG 4 Progress Ongoing  -AL     Time Calculation    PT Goal Re-Cert Due Date 08/18/17  -AL       User Key  (r) = Recorded By, (t) = Taken By, (c) = Cosigned By    Initials Name Provider Type    TANNER Trevizo PTA Physical Therapy Assistant                Therapy Education       08/10/17 0917          Therapy Education    Given Posture/body mechanics;HEP  -AL      Program Reinforced  -AL      How Provided Verbal  -AL      Provided to Patient  -AL      Level of Understanding Verbalized  -AL        User Key  (r) = Recorded By, (t) = Taken By, (c) = Cosigned By    Initials Name Provider Type    TANNER Trevizo PTA Physical Therapy Assistant                Time Calculation:   Start Time: 0917  Stop Time: 1000  Time Calculation (min): 43 min  Total Timed Code Minutes- PT: 43 minute(s)    Therapy Charges for Today     Code Description Service Date Service Provider Modifiers Qty    89627771064 HC PT THER PROC EA 15 MIN 8/10/2017 Alfreda Trevizo PTA GP 1    09371331012 HC PT MANUAL THERAPY EA  15 MIN 8/10/2017 Alfreda Trevizo, PTA GP 2                    Alfreda Trevizo, PTA  8/10/2017

## 2017-08-16 ENCOUNTER — HOSPITAL ENCOUNTER (OUTPATIENT)
Dept: PHYSICAL THERAPY | Facility: HOSPITAL | Age: 27
Setting detail: THERAPIES SERIES
Discharge: HOME | End: 2017-08-16

## 2017-08-16 DIAGNOSIS — M25.511 ACUTE PAIN OF RIGHT SHOULDER: ICD-10-CM

## 2017-08-16 DIAGNOSIS — S46.911A RIGHT SHOULDER STRAIN, INITIAL ENCOUNTER: Primary | ICD-10-CM

## 2017-08-16 PROCEDURE — 97110 THERAPEUTIC EXERCISES: CPT | Performed by: PHYSICAL THERAPIST

## 2017-08-16 PROCEDURE — 97140 MANUAL THERAPY 1/> REGIONS: CPT | Performed by: PHYSICAL THERAPIST

## 2017-08-16 NOTE — THERAPY PROGRESS REPORT/RE-CERT
Outpatient Physical Therapy Ortho Progress Note  [facility]     Patient Name: Allyssa Holden  : 1990  MRN: [id number]  Today's Date: 2017      Visit Date: 2017    Visit Dx:    ICD-10-CM ICD-9-CM   1. Right shoulder strain, initial encounter S46.911A 840.9   2. Acute pain of right shoulder M25.511 719.41       There is no problem list on file for this patient.       Past Medical History:   Diagnosis Date   • ADD (attention deficit disorder)    • Anxiety    • Dementia    • Hypertension    • PTSD (post-traumatic stress disorder)         Past Surgical History:   Procedure Laterality Date   • APPENDECTOMY     •  SECTION                               PT Assessment/Plan       17 1030       PT Assessment    Functional Limitations Limitation in home management;Limitations in community activities;Performance in leisure activities;Performance in self-care ADL;Performance in work activities  -KR     Impairments Range of motion;Posture;Pain;Poor body mechanics;Muscle strength;Joint mobility  -KR     Assessment Comments No goals met, she has partially met her pain goal. She is still limited with shoulder mobility, having anterior GH head placement. Overall her ROM is improving. Her Quick DASH score went up, but the work module went down. She is reporting overall less pain and improved mobiltiy. Her posture is still a contributing factor and we will continue to education on this.   -KR     Please refer to paper survey for additional self-reported information Yes  -KR     Rehab Potential Good  -KR     Patient/caregiver participated in establishment of treatment plan and goals Yes  -KR     Patient would benefit from skilled therapy intervention Yes  -KR     PT Plan    PT Frequency 2x/week;3x/week  -KR     Predicted Duration of Therapy Intervention (days/wks) 4-6 weeks  -KR     Physical Therapy Interventions (Optional Details) home exercise program;joint mobilization;manual therapy  techniques;modalities;patient/family education;postural re-education;ROM (Range of Motion);strengthening;stretching;taping  -KR     PT Plan Comments Continue to work on her PROM and AROM, shoulder joint mobiltiy and should strength. Progressing HEP.   -KR       User Key  (r) = Recorded By, (t) = Taken By, (c) = Cosigned By    Initials Name Provider Type    ROD Ross, PT DPT Physical Therapist                    Exercises       08/16/17 1030          Subjective Comments    Subjective Comments She reports tired today, working yesterday. 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 She reports tender superior shoulder and lateral inferior scapula.   -KR      Subjective Pain    Able to rate subjective pain? yes  -KR      Pre-Treatment Pain Level 4  -KR      Exercise 1    Exercise Name 1 placed and hold shoulder flex at 90, 100 with pertubations  -KR      Reps 1 3  -KR      Time (Seconds) 1 20  -KR      Exercise 2    Exercise Name 2 arm to side ERIR pertubations  -KR      Reps 2 5  -KR      Time (Seconds) 2 20  -KR      Exercise 3    Exercise Name 3 assessed all goals for re-cert  -KR      Exercise 4    Exercise Name 4 discussed HEP and progression of POC  -KR        User Key  (r) = Recorded By, (t) = Taken By, (c) = Cosigned By    Initials Name Provider Type    ROD Ross, PT DPT Physical Therapist                        Manual Rx (last 36 hours)      Manual Treatments       08/16/17 1030          Manual Rx 1    Manual Rx 1 Location prone thoracic   -KR      Manual Rx 1 Grade 2/3   no cavitations  -KR      Manual Rx 1 Duration 5  -KR      Manual Rx 2    Manual Rx 2 Location hooklying glenohumeral inf/post mobilizations  -KR      Manual Rx 2 Grade 1/2  -KR      Manual Rx 2 Duration 5  -KR      Manual Rx 3    Manual Rx 3 Location STM R UT  -KR      Manual Rx 3 Grade min-mod  -KR      Manual Rx 3 Duration 5  -KR        User Key  (r) = Recorded By, (t) =  Taken By, (c) = Cosigned By    Initials Name Provider Type    ROD Ross, PT DPT Physical Therapist                PT OP Goals       08/16/17 1030       PT Short Term Goals    STG Date to Achieve 09/06/17  -KR     STG 1 Pt will report no pain greater than 3-4/10 with ADLs.  -KR     STG 1 Progress Ongoing;Partially Met  -KR     STG 1 Progress Comments 4/10 after working  -KR     STG 2 Pt will demonstrated 120 or greater shouldler AROM in supine.   -KR     STG 2 Progress Progressing;Partially Met;Ongoing  -KR     STG 2 Progress Comments 106 AROM seated  -KR     STG 3 Pt will score 30 or less on Quick Dash  -KR     STG 3 Progress Ongoing  -KR     STG 3 Progress Comments 50 today; down to 31.25 on work module  -KR     Long Term Goals    LTG Date to Achieve 09/27/17  -KR     LTG 1 Pt will be able to reach behind head and back for hygene and other ADLs.   -KR     LTG 1 Progress Ongoing  -KR     LTG 1 Progress Comments almost to ear; unable to do own hair still; unable to reach behind back   -KR     LTG 2 Pt will have 120 or greater AROM shoulder flexion in sitting to improve reaching with household and work activities.   -KR     LTG 2 Progress Ongoing  -KR     LTG 2 Progress Comments see STG 2  -KR     LTG 3 Pt will have 4+/5 shoulder strength, globally in order to litft children ad perfrom other household and work activities.   -KR     LTG 3 Progress Ongoing  -KR     LTG 3 Progress Comments does not have full AROM; 2-/5 globally  -KR     LTG 4 Pt will score 10 or less on Quick Dash   -KR     LTG 4 Progress Ongoing  -KR     LTG 4 Progress Comments see STG 3  -KR     Time Calculation    PT Goal Re-Cert Due Date 09/15/17  -KR       User Key  (r) = Recorded By, (t) = Taken By, (c) = Cosigned By    Initials Name Provider Type    ROD Ross, PT DPT Physical Therapist                Therapy Education       08/16/17 1030          Therapy Education    Given Posture/body mechanics;HEP  -KR      Program  Reinforced  -KR      How Provided Verbal  -KR      Provided to Patient  -KR      Level of Understanding Verbalized;Demonstrated;Teach back education performed  -KR        User Key  (r) = Recorded By, (t) = Taken By, (c) = Cosigned By    Initials Name Provider Type    ROD Ross, PT DPT Physical Therapist                Outcome Measures       08/16/17 1030          Quick DASH    Open a tight or new jar. 2  -KR      Do heavy household chores (e.g., wash walls, wash floors) 3  -KR      Carry a shopping bag or briefcase 3  -KR      Wash your back 5  -KR      Use a knife to cut food 2  -KR      Recreational activities in which you take some force or impact through your arm, should or hand (e.g. golf, hammering, tennis, etc.) 4  -KR      During the past week, to what extent has your arm, shoulder, or hand problem interfered with your normal social activites with family, friends, neighbors or groups? 3  -KR      During the past week, were you limited in your work or other regular daily activities as a result of your arm, shoulder or hand problem? 3  -KR      Arm, Shoulder, or hand pain 3  -KR      Tingling (pins and needles) in your arm, shoulder, or hand 2  -KR      During the past week, how much difficulty have you had sleeping because of the pain in your arm, shoulder or hand? 3  -KR      Number of Questions Answered 11  -KR      Quick DASH Score 50  -KR      Work Module (Optional)    Using your usual technique for your work? 2  -KR      Doing your usual work because of arm, shoulder or hand pain? 2  -KR      Doing your work as well as you would like? 3  -KR      Spending your usual amount of time doing your work? 2  -KR      Work Module Score 31.25  -KR      Functional Assessment    Outcome Measure Options Quick DASH  -KR        User Key  (r) = Recorded By, (t) = Taken By, (c) = Cosigned By    Initials Name Provider Type    ROD Ross PT DPT Physical Therapist            Time Calculation:   Start  Time: 1030  Stop Time: 1125  Time Calculation (min): 55 min  Total Timed Code Minutes- PT: 53 minute(s)    Therapy Charges for Today     Code Description Service Date Service Provider Modifiers Qty    38849192087 HC PT THER PROC EA 15 MIN 8/16/2017 Danyell Ross, PT DPT GP 3    45530883475 HC PT MANUAL THERAPY EA 15 MIN 8/16/2017 Danyell Ross, PT DPT GP 1          PT G-Codes  Outcome Measure Options: Quick DASH         Danyell Ross, PT DPT  8/16/2017

## 2017-08-22 ENCOUNTER — APPOINTMENT (OUTPATIENT)
Dept: PHYSICAL THERAPY | Facility: HOSPITAL | Age: 27
End: 2017-08-22

## 2017-08-23 ENCOUNTER — HOSPITAL ENCOUNTER (OUTPATIENT)
Dept: PHYSICAL THERAPY | Facility: HOSPITAL | Age: 27
Setting detail: THERAPIES SERIES
Discharge: HOME | End: 2017-08-23

## 2017-08-23 DIAGNOSIS — M25.511 ACUTE PAIN OF RIGHT SHOULDER: ICD-10-CM

## 2017-08-23 DIAGNOSIS — S46.911A RIGHT SHOULDER STRAIN, INITIAL ENCOUNTER: Primary | ICD-10-CM

## 2017-08-23 PROCEDURE — 97140 MANUAL THERAPY 1/> REGIONS: CPT

## 2017-08-23 PROCEDURE — 97110 THERAPEUTIC EXERCISES: CPT

## 2017-08-23 NOTE — THERAPY TREATMENT NOTE
Outpatient Physical Therapy Ortho Treatment Note  [facility]     Patient Name: Allyssa Holden  : 1990  MRN: [id number]  Today's Date: 2017      Visit Date: 2017    Visit Dx:    ICD-10-CM ICD-9-CM   1. Right shoulder strain, initial encounter S46.911A 840.9   2. Acute pain of right shoulder M25.511 719.41       There is no problem list on file for this patient.       Past Medical History:   Diagnosis Date   • ADD (attention deficit disorder)    • Anxiety    • Dementia    • Hypertension    • PTSD (post-traumatic stress disorder)         Past Surgical History:   Procedure Laterality Date   • APPENDECTOMY     •  SECTION                               PT Assessment/Plan       17 1117       PT Assessment    Assessment Comments Patient's PROM has improved since the last time this PTA saw her on 2017.  She did have multiple cavitations during PROM flexion with reported pain with the cavitation, but the pain did subside quickly.  She has decreased right scapular upward rotation, which limits her overall mechanics and mobility.  She reportes skin irritation since the last application of cover roll and leukotape, which she still has visible irritation, but she reports it is getting better.  -YEFRI     PT Plan    PT Plan Comments Continue to work on scapular mobility, shoulder PROM, and progress with stability.  -YEFRI       User Key  (r) = Recorded By, (t) = Taken By, (c) = Cosigned By    Initials Name Provider Type    YEFRI Jensen, PTA Physical Therapy Assistant                    Exercises       17 1117          Subjective Comments    Subjective Comments Patient reports she feels her shoulder is getting better.  She reports she continues to have difficulty reaching behind her back.  She can reach higher without pain,  but then pain starts.  She reports she had skin irritation since the last time she had tape on and needed to take tape off after 24 hours.  She continues to  have skin irritation, but reports it is improving.  She has been putting hydrocortisone cream on her shoulder.  -YEFRI      Subjective Pain    Able to rate subjective pain? yes  -YEFRI      Pre-Treatment Pain Level 3  -YEFRI      Post-Treatment Pain Level 4  -YEFRI      Subjective Pain Comment Rates pain 3.5-4/10 after treatment.  -YEFRI      Exercise 1    Exercise Name 1 place and holds at 90 shoulder flex painfree; w/o and w perturbations   -YEFRI      Cueing 1 Verbal;Tactile  -YEFRI      Reps 1 2   each with and with pertubation  -YEFRI      Time (Seconds) 1 20-30s, with 13-20 second rest breaks in between  -YEFRI      Exercise 2    Exercise Name 2 seated (R) shoulder elevation assessing scapular upward rotation, which is decreased compared to uninvolved side.  -YEFRI      Cueing 2 Verbal;Tactile  -YEFRI        User Key  (r) = Recorded By, (t) = Taken By, (c) = Cosigned By    Initials Name Provider Type    YEFRI Jensen PTA Physical Therapy Assistant                        Manual Rx (last 36 hours)      Manual Treatments       08/23/17 1121          Manual Rx 1    Manual Rx 1 Location prone upper/middle  -YEFRI      Manual Rx 1 Type extension mobilization  -YEFRI      Manual Rx 1 Grade 2 oscillatory  -YEFRI      Manual Rx 1 Duration 5  -YEFRI      Manual Rx 2    Manual Rx 2 Location R UT/LS  -YEFRI      Manual Rx 2 Type STM  -YEFRI      Manual Rx 2 Grade mid-mod  -YEFRI      Manual Rx 2 Duration 5  -YEFRI      Manual Rx 3    Manual Rx 3 Location PROM (R) into flexion: patient had multiple(4-5) cavitations with this motion today with reported pain, but the pain would subside quickly  -YEFRI      Manual Rx 3 Duration 8  -YEFRI      Manual Rx 4    Manual Rx 4 Location PROM (R) shoulder ER in 30 deg abd with towel under elbow  -YEFRI      Manual Rx 4 Type repetition  -YEFRI      Manual Rx 4 Duration 5  -YEFRI      Manual Rx 5    Manual Rx 5 Location sidelying (R) teres major  -YEFRI      Manual Rx 5 Type STM  -YEFRI      Manual Rx 5 Grade min-mod  -YEFRI      Manual Rx 5 Duration 3  -YEFRI         User Key  (r) = Recorded By, (t) = Taken By, (c) = Cosigned By    Initials Name Provider Type    YEFRI Jensen PTA Physical Therapy Assistant                PT OP Goals       08/23/17 1117       PT Short Term Goals    STG Date to Achieve 09/06/17  -YEFRI     STG 1 Pt will report no pain greater than 3-4/10 with ADLs.  -YEFRI     STG 1 Progress Ongoing;Partially Met  -YEFRI     STG 1 Progress Comments Pain reaches 6/10 after working per patient  today.  Today her pain was 3 at beginning of treatment and 4/10 at the end of treatment  -YEFRI     STG 2 Pt will demonstrated 120 or greater shouldler AROM in supine.   -YEFRI     STG 2 Progress Progressing;Partially Met;Ongoing  -YEFRI     STG 3 Pt will score 30 or less on Quick Dash  -YEFRI     STG 3 Progress Ongoing  -YEFRI     Long Term Goals    LTG Date to Achieve 09/27/17  -YEFRI     LTG 1 Pt will be able to reach behind head and back for hygene and other ADLs.   -YEFRI     LTG 1 Progress Ongoing  -YEFRI     LTG 2 Pt will have 120 or greater AROM shoulder flexion in sitting to improve reaching with household and work activities.   -YEFRI     LTG 2 Progress Ongoing  -YEFRI     LTG 3 Pt will have 4+/5 shoulder strength, globally in order to litft children ad perfrom other household and work activities.   -YEFRI     LTG 3 Progress Ongoing  -YEFRI     LTG 4 Pt will score 10 or less on Quick Dash   -YEFRI     LTG 4 Progress Ongoing  -YEFRI     Time Calculation    PT Goal Re-Cert Due Date 09/15/17  -YEFRI       User Key  (r) = Recorded By, (t) = Taken By, (c) = Cosigned By    Initials Name Provider Type    YEFRI Jensen PTA Physical Therapy Assistant                Therapy Education       08/23/17 1407          Therapy Education    Given HEP  -YEFRI      Program Reinforced  -YEFRI      How Provided Verbal  -YEFRI      Provided to Patient  -YEFRI      Level of Understanding Verbalized  -YEFRI        User Key  (r) = Recorded By, (t) = Taken By, (c) = Cosigned By    Initials Name Provider Type    YEFRI Jensen PTA  Physical Therapy Assistant                Time Calculation:   Start Time: 1117  Stop Time: 1200  Time Calculation (min): 43 min  PT Non-Billable Time (min): 5 min  Total Timed Code Minutes- PT: 38 minute(s)    Therapy Charges for Today     Code Description Service Date Service Provider Modifiers Qty    15690802227 HC PT MANUAL THERAPY EA 15 MIN 8/23/2017 Coleman Jensen PTA GP 2    16081025159 HC PT THER PROC EA 15 MIN 8/23/2017 Coleman Jensen PTA GP 1                    Coleman Jensen PTA  8/23/2017

## 2017-08-24 ENCOUNTER — HOSPITAL ENCOUNTER (OUTPATIENT)
Dept: PHYSICAL THERAPY | Facility: HOSPITAL | Age: 27
Setting detail: THERAPIES SERIES
Discharge: HOME | End: 2017-08-24

## 2017-08-24 DIAGNOSIS — M25.511 ACUTE PAIN OF RIGHT SHOULDER: ICD-10-CM

## 2017-08-24 DIAGNOSIS — S46.911A RIGHT SHOULDER STRAIN, INITIAL ENCOUNTER: Primary | ICD-10-CM

## 2017-08-24 PROCEDURE — 97140 MANUAL THERAPY 1/> REGIONS: CPT | Performed by: PHYSICAL THERAPIST

## 2017-08-24 PROCEDURE — 97110 THERAPEUTIC EXERCISES: CPT | Performed by: PHYSICAL THERAPIST

## 2017-08-24 NOTE — THERAPY TREATMENT NOTE
Outpatient Physical Therapy Ortho Treatment Note  [facility]     Patient Name: Allyssa Holden  : 1990  MRN: [id number]  Today's Date: 2017      Visit Date: 2017    Visit Dx:    ICD-10-CM ICD-9-CM   1. Right shoulder strain, initial encounter S46.911A 840.9   2. Acute pain of right shoulder M25.511 719.41       There is no problem list on file for this patient.       Past Medical History:   Diagnosis Date   • ADD (attention deficit disorder)    • Anxiety    • Dementia    • Hypertension    • PTSD (post-traumatic stress disorder)         Past Surgical History:   Procedure Laterality Date   • APPENDECTOMY     •  SECTION                               PT Assessment/Plan       17 1102       PT Assessment    Assessment Comments Her AROM improved today, but she is still limited with full ROM. We started to increase should strengthening without increase in pain, but were limited with full ROM.   -KR     PT Plan    PT Plan Comments Continue to work GH, thoracic and scapular mobility. Progress with cuff strengthening as tolerted.  -KR       User Key  (r) = Recorded By, (t) = Taken By, (c) = Cosigned By    Initials Name Provider Type    ROD Ross, PT DPT Physical Therapist                    Exercises       17 1102          Subjective Comments    Subjective Comments She reports some shoulder soreness yesterday. She reports working around her house a lot. She reports only occassional popping when she stretches her arm at home, but had increased yesterday.   -KR      Subjective Pain    Able to rate subjective pain? yes  -KR      Pre-Treatment Pain Level --   3-3.5/10 prior to treatment  -KR      Post-Treatment Pain Level --   3.5/10  -KR      Exercise 1    Exercise Name 1 prone scapular restractions 1/2 motion  -KR      Cueing 1 Verbal;Tactile  -KR      Sets 1 2  -KR      Reps 1 10  -KR      Exercise 2    Exercise Name 2 pt reported slight soreness in anterior shoulder after  prone activities.   -KR      Exercise 3    Exercise Name 3 90 shoulder flex painfree;  w perturbations   -KR      Cueing 3 Tactile  -KR      Sets 3 3  -KR      Time (Seconds) 3 30  -KR      Exercise 4    Exercise Name 4 arm to side ERIR pertubations  -KR      Cueing 4 Tactile  -KR      Reps 4 3  -KR      Time (Seconds) 4 30  -KR      Exercise 5    Exercise Name 5 serratus punches  -KR      Cueing 5 Verbal  -KR      Sets 5 2  -KR      Reps 5 10  -KR        User Key  (r) = Recorded By, (t) = Taken By, (c) = Cosigned By    Initials Name Provider Type    ROD Ross, PT DPT Physical Therapist                        Manual Rx (last 36 hours)      Manual Treatments       08/24/17 1102 08/23/17 1121       Manual Rx 1    Manual Rx 1 Location prone thoracic   -KR prone upper/middle  -YEFRI     Manual Rx 1 Type  extension mobilization  -YEFRI     Manual Rx 1 Grade 2/3   no cavitations  -KR 2 oscillatory  -YEFRI     Manual Rx 1 Duration 7  -KR 5  -YEFRI     Manual Rx 2    Manual Rx 2 Location R UT/LS  -KR R UT/LS  -YEFRI     Manual Rx 2 Type STM   -KR STM  -YEFRI     Manual Rx 2 Grade min-mod  -KR mid-mod  -YEFRI     Manual Rx 2 Duration 5  -KR 5  -YEFRI     Manual Rx 3    Manual Rx 3 Location hooklying GH inferior/posterior mobilizations  -KR PROM (R) into flexion: patient had multiple(4-5) cavitations with this motion today with reported pain, but the pain would subside quickly  -YEFRI     Manual Rx 3 Grade 2/3  -KR      Manual Rx 3 Duration 5  -KR 8  -YEFRI     Manual Rx 4    Manual Rx 4 Location hooklying STM to bicep/deltoid regions  -KR PROM (R) shoulder ER in 30 deg abd with towel under elbow  -YEFRI     Manual Rx 4 Type  repetition  -YEFRI     Manual Rx 4 Grade mod  -KR      Manual Rx 4 Duration 8  -KR 5  -YEFRI     Manual Rx 5    Manual Rx 5 Location  sidelying (R) teres major  -YEFRI     Manual Rx 5 Type  STM  -YEFRI     Manual Rx 5 Grade  min-mod  -YEFRI     Manual Rx 5 Duration  3  -YEFRI       User Key  (r) = Recorded By, (t) = Taken By, (c) =  Cosigned By    Initials Name Provider Type    ROD Ross, PT DPT Physical Therapist    YEFRI Jensen, PTA Physical Therapy Assistant                PT OP Goals       08/24/17 1102       PT Short Term Goals    STG Date to Achieve 09/06/17  -KR     STG 1 Pt will report no pain greater than 3-4/10 with ADLs.  -KR     STG 1 Progress Ongoing;Partially Met  -KR     STG 1 Progress Comments 3.5/10 today after cleaning house yesterday.   -KR     STG 2 Pt will demonstrated 120 or greater shouldler AROM in supine.   -KR     STG 2 Progress Progressing;Partially Met;Ongoing  -KR     STG 3 Pt will score 30 or less on Quick Dash  -KR     STG 3 Progress Ongoing  -KR     Long Term Goals    LTG Date to Achieve 09/27/17  -KR     LTG 1 Pt will be able to reach behind head and back for hygene and other ADLs.   -KR     LTG 1 Progress Ongoing  -KR     LTG 2 Pt will have 120 or greater AROM shoulder flexion in sitting to improve reaching with household and work activities.   -KR     LTG 2 Progress Ongoing  -KR     LTG 2 Progress Comments 100 supine  -KR     LTG 3 Pt will have 4+/5 shoulder strength, globally in order to litft children ad perfrom other household and work activities.   -KR     LTG 3 Progress Ongoing  -KR     LTG 4 Pt will score 10 or less on Quick Dash   -KR     LTG 4 Progress Ongoing  -KR     Time Calculation    PT Goal Re-Cert Due Date 09/15/17  -KR       User Key  (r) = Recorded By, (t) = Taken By, (c) = Cosigned By    Initials Name Provider Type    ROD Ross, PT DPT Physical Therapist                Therapy Education       08/24/17 1102 08/23/17 1407       Therapy Education    Education Details reminded to not guard RUE with walking.   -KR      Given HEP;Symptoms/condition management;Posture/body mechanics  -KR HEP  -YEFRI     Program Reinforced  -KR Reinforced  -YEFRI     How Provided Verbal  -KR Verbal  -YEFRI     Provided to Patient  -KR Patient  -YEFRI     Level of Understanding Verbalized  -KR  Verbalized  -YEFRI       User Key  (r) = Recorded By, (t) = Taken By, (c) = Cosigned By    Initials Name Provider Type    ROD Ross, PT DPT Physical Therapist    YEFRI Jensen, PAULIE Physical Therapy Assistant                Time Calculation:   Start Time: 1102  Stop Time: 1156  Time Calculation (min): 54 min  Total Timed Code Minutes- PT: 54 minute(s)    Therapy Charges for Today     Code Description Service Date Service Provider Modifiers Qty    18393044954  PT MANUAL THERAPY EA 15 MIN 8/24/2017 Danyell Ross, PT DPT GP 2    [id number]  PT THER PROC EA 15 MIN 8/24/2017 Danyell Ross, PT DPT GP 2                    Danyell Ross, PT DPT  8/24/2017

## 2017-08-28 ENCOUNTER — HOSPITAL ENCOUNTER (OUTPATIENT)
Dept: PHYSICAL THERAPY | Facility: HOSPITAL | Age: 27
Setting detail: THERAPIES SERIES
Discharge: HOME | End: 2017-08-28

## 2017-08-28 DIAGNOSIS — S46.911A RIGHT SHOULDER STRAIN, INITIAL ENCOUNTER: Primary | ICD-10-CM

## 2017-08-28 DIAGNOSIS — M25.511 ACUTE PAIN OF RIGHT SHOULDER: ICD-10-CM

## 2017-08-28 PROCEDURE — 97110 THERAPEUTIC EXERCISES: CPT | Performed by: PHYSICAL THERAPIST

## 2017-08-28 PROCEDURE — 97140 MANUAL THERAPY 1/> REGIONS: CPT | Performed by: PHYSICAL THERAPIST

## 2017-08-28 NOTE — THERAPY TREATMENT NOTE
Outpatient Physical Therapy Ortho Treatment Note  [facility]     Patient Name: Allyssa Holden  : 1990  MRN: [id number]  Today's Date: 2017      Visit Date: 2017    Visit Dx:    ICD-10-CM ICD-9-CM   1. Right shoulder strain, initial encounter S46.911A 840.9   2. Acute pain of right shoulder M25.511 719.41       There is no problem list on file for this patient.       Past Medical History:   Diagnosis Date   • ADD (attention deficit disorder)    • Anxiety    • Dementia    • Hypertension    • PTSD (post-traumatic stress disorder)         Past Surgical History:   Procedure Laterality Date   • APPENDECTOMY     •  SECTION                               PT Assessment/Plan       17 1115       PT Assessment    Assessment Comments She is still limtied with her AROM and shoulder strength limiting household and other activities.   -KR     PT Plan    PT Plan Comments Continue to work GH, thoracic mobiltiy as needed. Progressing with scapular and cuff strengthening. Update HEP to include more strengthening as tolerated.   -KR       User Key  (r) = Recorded By, (t) = Taken By, (c) = Cosigned By    Initials Name Provider Type    ROD Ross, PT DPT Physical Therapist                    Exercises       17 1115          Subjective Comments    Subjective Comments She reports the shoulder is about the same. She reports slight increase in shoulder pain after having to lift and change over laundry.   -KR      Subjective Pain    Able to rate subjective pain? yes  -KR      Pre-Treatment Pain Level --   3.5 or 4/10  -KR      Post-Treatment Pain Level --   3 or 3.5  -KR      Exercise 1    Exercise Name 1 prone scapular restractions  -KR      Cueing 1 Verbal  -KR      Sets 1 3  -KR      Reps 1 10  -KR      Exercise 2    Exercise Name 2 prone mid-lower trap, hands on head.   -KR      Cueing 2 Verbal;Tactile  -KR      Sets 2 2  -KR      Reps 2 10  -KR      Exercise 3    Exercise Name 3   foam roll thoracic ext  -KR      Cueing 3 Verbal  -KR      Time (Minutes) 3 5  -KR      Exercise 4    Exercise Name 4 sserratus punches  -KR      Cueing 4 Verbal;Demo  -KR      Sets 4 2  -KR      Reps 4 20   fatigued at theh end of the 2nd set  -KR      Exercise 5    Exercise Name 5 shoulder circles at 90 with 1#   -KR        User Key  (r) = Recorded By, (t) = Taken By, (c) = Cosigned By    Initials Name Provider Type    ROD Ross, PT DPT Physical Therapist                        Manual Rx (last 36 hours)      Manual Treatments       08/28/17 1115          Manual Rx 1    Manual Rx 1 Location prone thoracic   -KR      Manual Rx 1 Grade 2/3   no cavitations  -KR      Manual Rx 1 Duration 5  -KR      Manual Rx 2    Manual Rx 2 Location R UT/LS  -KR      Manual Rx 2 Type STM   -KR      Manual Rx 2 Grade min-mod  -KR      Manual Rx 2 Duration 5  -KR      Manual Rx 3    Manual Rx 3 Location --  -KR      Manual Rx 3 Grade --  -KR      Manual Rx 3 Duration --  -KR      Manual Rx 4    Manual Rx 4 Location hooklying STM to bicep/deltoid regions R  -KR      Manual Rx 4 Grade mod  -KR      Manual Rx 4 Duration 5  -KR        User Key  (r) = Recorded By, (t) = Taken By, (c) = Cosigned By    Initials Name Provider Type    ROD Ross, PT DPT Physical Therapist                PT OP Goals       08/28/17 1115       PT Short Term Goals    STG Date to Achieve 09/06/17  -KR     STG 1 Pt will report no pain greater than 3-4/10 with ADLs.  -KR     STG 1 Progress Ongoing;Partially Met  -KR     STG 2 Pt will demonstrated 120 or greater shouldler AROM in supine.   -KR     STG 2 Progress Progressing;Partially Met;Ongoing  -KR     STG 2 Progress Comments 100 AROM today  -KR     STG 3 Pt will score 30 or less on Quick Dash  -KR     STG 3 Progress Ongoing  -KR     Long Term Goals    LTG Date to Achieve 09/27/17  -KR     LTG 1 Pt will be able to reach behind head and back for hygene and other ADLs.   -KR     LTG 1  Progress Ongoing  -KR     LTG 2 Pt will have 120 or greater AROM shoulder flexion in sitting to improve reaching with household and work activities.   -KR     LTG 2 Progress Ongoing  -KR     LTG 3 Pt will have 4+/5 shoulder strength, globally in order to litft children ad perfrom other household and work activities.   -KR     LTG 3 Progress Ongoing  -KR     LTG 4 Pt will score 10 or less on Quick Dash   -KR     LTG 4 Progress Ongoing  -KR     Time Calculation    PT Goal Re-Cert Due Date 09/15/17  -KR       User Key  (r) = Recorded By, (t) = Taken By, (c) = Cosigned By    Initials Name Provider Type    ROD Ross PT DPT Physical Therapist                Therapy Education       08/28/17 1115          Therapy Education    Given HEP;Posture/body mechanics  -KR      Program Reinforced  -KR      How Provided Verbal  -KR      Provided to Patient  -KR      Level of Understanding Verbalized  -KR        User Key  (r) = Recorded By, (t) = Taken By, (c) = Cosigned By    Initials Name Provider Type    ROD Ross PT DPT Physical Therapist                Time Calculation:   Start Time: 1115  Stop Time: 1200  Time Calculation (min): 45 min  Total Timed Code Minutes- PT: 45 minute(s)    Therapy Charges for Today     Code Description Service Date Service Provider Modifiers Qty    04961960209 HC PT MANUAL THERAPY EA 15 MIN 8/28/2017 Danyell Ross, PT DPT GP 1    30142383895 HC PT THER PROC EA 15 MIN 8/28/2017 Danyell Ross PT DPT GP 2                    Danyell Ross, PT DPT  8/28/2017

## 2017-08-30 ENCOUNTER — HOSPITAL ENCOUNTER (OUTPATIENT)
Dept: PHYSICAL THERAPY | Facility: HOSPITAL | Age: 27
Setting detail: THERAPIES SERIES
Discharge: HOME | End: 2017-08-30

## 2017-08-30 DIAGNOSIS — M25.511 ACUTE PAIN OF RIGHT SHOULDER: ICD-10-CM

## 2017-08-30 DIAGNOSIS — S46.911A RIGHT SHOULDER STRAIN, INITIAL ENCOUNTER: Primary | ICD-10-CM

## 2017-08-30 PROCEDURE — 97110 THERAPEUTIC EXERCISES: CPT | Performed by: PHYSICAL THERAPIST

## 2017-08-30 PROCEDURE — 97140 MANUAL THERAPY 1/> REGIONS: CPT | Performed by: PHYSICAL THERAPIST

## 2017-09-01 ENCOUNTER — APPOINTMENT (OUTPATIENT)
Dept: PHYSICAL THERAPY | Facility: HOSPITAL | Age: 27
End: 2017-09-01

## 2017-09-06 ENCOUNTER — APPOINTMENT (OUTPATIENT)
Dept: PHYSICAL THERAPY | Facility: HOSPITAL | Age: 27
End: 2017-09-06

## 2017-09-07 ENCOUNTER — HOSPITAL ENCOUNTER (OUTPATIENT)
Dept: PHYSICAL THERAPY | Facility: HOSPITAL | Age: 27
Setting detail: THERAPIES SERIES
Discharge: HOME | End: 2017-09-07

## 2017-09-07 DIAGNOSIS — M25.511 ACUTE PAIN OF RIGHT SHOULDER: ICD-10-CM

## 2017-09-07 DIAGNOSIS — S46.911A RIGHT SHOULDER STRAIN, INITIAL ENCOUNTER: Primary | ICD-10-CM

## 2017-09-07 PROCEDURE — 97110 THERAPEUTIC EXERCISES: CPT

## 2017-09-07 PROCEDURE — 97140 MANUAL THERAPY 1/> REGIONS: CPT

## 2017-09-07 NOTE — THERAPY TREATMENT NOTE
Outpatient Physical Therapy Ortho Treatment Note  [facility]     Patient Name: Allyssa Holden  : 1990  MRN: [id number]  Today's Date: 2017      Visit Date: 2017    Visit Dx:    ICD-10-CM ICD-9-CM   1. Right shoulder strain, initial encounter S46.911A 840.9   2. Acute pain of right shoulder M25.511 719.41       There is no problem list on file for this patient.       Past Medical History:   Diagnosis Date   • ADD (attention deficit disorder)    • Anxiety    • Dementia    • Hypertension    • PTSD (post-traumatic stress disorder)         Past Surgical History:   Procedure Laterality Date   • APPENDECTOMY     •  SECTION                               PT Assessment/Plan       17 1126       PT Assessment    Assessment Comments She didn't appear to be limited with activity today due to pain but more so weakness. She continues to exhibit poor posture which will contribute to the chronicity of her shoulder pain and we will contiinue to work to improve this. Today was the first time we introdued proprioceptive activities and she did exhibit  deficit with this.   -EC     PT Plan    PT Plan Comments Continue to educate, increase R shoulder global strengthening and flexibility.  -EC       User Key  (r) = Recorded By, (t) = Taken By, (c) = Cosigned By    Initials Name Provider Type    DEVAN Estrada PTA Physical Therapy Assistant                    Exercises       17 1000          Subjective Comments    Subjective Comments Reports R shouler pain and pain in behind the shoulder blade but improved use of the UE as compared to beginning P.T.  -EC      Subjective Pain    Able to rate subjective pain? yes  -EC      Pre-Treatment Pain Level 4  -EC      Post-Treatment Pain Level 4  -EC      Exercise 1    Exercise Name 1 CW/CCW in supine  -EC      Time (Minutes) 1 2  -EC      Additional Comments #1  -EC      Exercise 2    Exercise Name 2 isometric shoulder flexion against manual  perturbations at 45, 90 and 120 degrees   -EC      Reps 2 3  -EC      Time (Seconds) 2 30  -EC      Exercise 3    Exercise Name 3 supine serratus punches  -EC      Reps 3 20  -EC      Additional Comments #2  -EC      Exercise 4    Exercise Name 4 isometric flexion holds EC at 45, 90 and 120 degrees with mnual perturbations  -EC      Reps 4 2  -EC      Time (Seconds) 4 30  -EC        User Key  (r) = Recorded By, (t) = Taken By, (c) = Cosigned By    Initials Name Provider Type    EC Spike Estrada PTA Physical Therapy Assistant                        Manual Rx (last 36 hours)      Manual Treatments       09/07/17 0900          Manual Rx 1    Manual Rx 1 Location R bicep anterior and anteriorlateral shoulder  -EC      Manual Rx 1 Type IASTM with EDGE tool  -EC      Manual Rx 1 Duration 10  -EC        User Key  (r) = Recorded By, (t) = Taken By, (c) = Cosigned By    Initials Name Provider Type    EC Spike Estrada PTA Physical Therapy Assistant                PT OP Goals       09/07/17 1034       PT Short Term Goals    STG Date to Achieve 09/06/17  -EC     STG 1 Pt will report no pain greater than 3-4/10 with ADLs.  -EC     STG 1 Progress Ongoing;Partially Met  -EC     STG 1 Progress Comments 4/10 pain prior to and post treatment  -EC     STG 2 Pt will demonstrated 120 or greater shouldler AROM in supine.   -EC     STG 2 Progress Progressing;Partially Met;Ongoing  -EC     STG 3 Pt will score 30 or less on Quick Dash  -EC     STG 3 Progress Ongoing  -EC     Long Term Goals    LTG Date to Achieve 09/27/17  -EC     LTG 1 Pt will be able to reach behind head and back for hygene and other ADLs.   -EC     LTG 1 Progress Ongoing  -EC     LTG 2 Pt will have 120 or greater AROM shoulder flexion in sitting to improve reaching with household and work activities.   -EC     LTG 2 Progress Ongoing  -EC     LTG 3 Pt will have 4+/5 shoulder strength, globally in order to litft children ad perfrom other household and work  activities.   -EC     LTG 3 Progress Ongoing  -EC     LTG 4 Pt will score 10 or less on Quick Dash   -EC     LTG 4 Progress Ongoing  -EC     Time Calculation    PT Goal Re-Cert Due Date 09/15/17  -EC       User Key  (r) = Recorded By, (t) = Taken By, (c) = Cosigned By    Initials Name Provider Type    EC Spike Estrada PTA Physical Therapy Assistant                Therapy Education       09/07/17 1124          Therapy Education    Given Symptoms/condition management  -EC      How Provided Verbal  -EC      Provided to Patient  -EC      Level of Understanding Verbalized  -EC        User Key  (r) = Recorded By, (t) = Taken By, (c) = Cosigned By    Initials Name Provider Type    EC Spike Estrada PTA Physical Therapy Assistant                Time Calculation:   Start Time: 1033    Therapy Charges for Today     Code Description Service Date Service Provider Modifiers Qty    51859276870 HC PT THER PROC EA 15 MIN 9/7/2017 Spike Estrada PTA GP 2    63411669385 HC PT MANUAL THERAPY EA 15 MIN 9/7/2017 Spike Estrada PTA GP 1                    Spike Estrada PTA  9/7/2017

## 2017-09-08 ENCOUNTER — APPOINTMENT (OUTPATIENT)
Dept: PHYSICAL THERAPY | Facility: HOSPITAL | Age: 27
End: 2017-09-08

## 2017-09-11 ENCOUNTER — HOSPITAL ENCOUNTER (OUTPATIENT)
Dept: PHYSICAL THERAPY | Facility: HOSPITAL | Age: 27
Setting detail: THERAPIES SERIES
Discharge: HOME | End: 2017-09-11

## 2017-09-11 DIAGNOSIS — S46.911A RIGHT SHOULDER STRAIN, INITIAL ENCOUNTER: Primary | ICD-10-CM

## 2017-09-11 DIAGNOSIS — M25.511 ACUTE PAIN OF RIGHT SHOULDER: ICD-10-CM

## 2017-09-11 PROCEDURE — 97110 THERAPEUTIC EXERCISES: CPT | Performed by: PHYSICAL THERAPIST

## 2017-09-11 PROCEDURE — 97140 MANUAL THERAPY 1/> REGIONS: CPT | Performed by: PHYSICAL THERAPIST

## 2017-09-11 NOTE — THERAPY TREATMENT NOTE
Outpatient Physical Therapy Ortho Treatment Note  [facility]     Patient Name: Allyssa Holden  : 1990  MRN: [id number]  Today's Date: 2017      Visit Date: 2017    Visit Dx:    ICD-10-CM ICD-9-CM   1. Right shoulder strain, initial encounter S46.911A 840.9   2. Acute pain of right shoulder M25.511 719.41       There is no problem list on file for this patient.       Past Medical History:   Diagnosis Date   • ADD (attention deficit disorder)    • Anxiety    • Dementia    • Hypertension    • PTSD (post-traumatic stress disorder)         Past Surgical History:   Procedure Laterality Date   • APPENDECTOMY     •  SECTION                               PT Assessment/Plan       17 1115       PT Assessment    Assessment Comments She reported less pain today and was not as guarded when walking into therapy. Her posture is still limited causing anteriorly rotated humerus. She did show improved shoulde flexion, but still limites with OH reaching.   -KR     PT Plan    PT Plan Comments Continue to work posture and shoulder mobiltiy. Progress note next visit.   -KR       User Key  (r) = Recorded By, (t) = Taken By, (c) = Cosigned By    Initials Name Provider Type    ROD Ross, PT DPT Physical Therapist                    Exercises       17 1115          Subjective Comments    Subjective Comments She reports shoulder is ok.   -KR      Subjective Pain    Able to rate subjective pain? yes  -KR      Pre-Treatment Pain Level 3  -KR      Post-Treatment Pain Level 3  -KR      Subjective Pain Comment R shoulder and shoulder blade dwn to lower back  -KR      Exercise 1    Exercise Name 1 prone I's   -KR      Cueing 1 Verbal;Tactile  -KR      Sets 1 2  -KR      Reps 1 15  -KR      Exercise 2    Exercise Name 2 wand flexion with 1# to abut 90; focuse on preventign shruggingn.   -KR      Cueing 2 Verbal  -KR      Sets 2 1  -KR      Reps 2 10  -KR      Exercise 3    Exercise Name 3  supine serratus punches  -KR      Cueing 3 Verbal;Tactile  -KR      Sets 3 2  -KR      Reps 3 20  -KR      Additional Comments 2#  -KR      Exercise 4    Exercise Name 4 standing shoulder scaption at 90 pertubations with hand against wall on ball  -KR      Cueing 4 Tactile;Verbal  -KR      Reps 4 3  -KR      Time (Seconds) 4 30  -KR      Exercise 5    Exercise Name 5 serratus work on wall  -KR      Cueing 5 Demo  -KR      Reps 5 10  -KR        User Key  (r) = Recorded By, (t) = Taken By, (c) = Cosigned By    Initials Name Provider Type    ROD Ross, PT DPT Physical Therapist                        Manual Rx (last 36 hours)      Manual Treatments       09/11/17 1115          Manual Rx 1    Manual Rx 1 Location R bicep anterior and anteriorlateral shoulder  -KR      Manual Rx 1 Type IASTM with EDGE tool  -KR      Manual Rx 1 Duration 7  -KR      Manual Rx 2    Manual Rx 2 Location prone thoracic - upper and mid  -KR      Manual Rx 2 Grade 2/3   no cavitations  -KR      Manual Rx 2 Duration 5  -KR      Manual Rx 3    Manual Rx 3 Location mid thoracic R UPAs  -KR      Manual Rx 3 Grade 2/3  -KR      Manual Rx 3 Duration 4  -KR        User Key  (r) = Recorded By, (t) = Taken By, (c) = Cosigned By    Initials Name Provider Type    ROD Ross, PT DPT Physical Therapist                PT OP Goals       09/11/17 1115       PT Short Term Goals    STG Date to Achieve 09/06/17  -KR     STG 1 Pt will report no pain greater than 3-4/10 with ADLs.  -KR     STG 1 Progress Ongoing;Partially Met  -KR     STG 1 Progress Comments down to 3/10 today  -KR     STG 2 Pt will demonstrated 120 or greater shouldler AROM in supine.   -KR     STG 2 Progress Progressing;Partially Met;Ongoing  -KR     STG 2 Progress Comments 105 today   -KR     STG 3 Pt will score 30 or less on Quick Dash  -KR     STG 3 Progress Ongoing  -KR     Long Term Goals    LTG Date to Achieve 09/27/17  -KR     LTG 1 Pt will be able to reach  behind head and back for hygene and other ADLs.   -KR     LTG 1 Progress Ongoing  -KR     LTG 2 Pt will have 120 or greater AROM shoulder flexion in sitting to improve reaching with household and work activities.   -KR     LTG 2 Progress Ongoing  -KR     LTG 2 Progress Comments 105 today   -KR     LTG 3 Pt will have 4+/5 shoulder strength, globally in order to litft children ad perfrom other household and work activities.   -KR     LTG 3 Progress Ongoing  -KR     LTG 4 Pt will score 10 or less on Quick Dash   -KR     LTG 4 Progress Ongoing  -KR     Time Calculation    PT Goal Re-Cert Due Date 09/15/17  -KR       User Key  (r) = Recorded By, (t) = Taken By, (c) = Cosigned By    Initials Name Provider Type    ROD Ross PT DPWENDY Physical Therapist                Therapy Education       09/11/17 1115          Therapy Education    Given Posture/body mechanics  -KR      Program Reinforced  -KR      How Provided Verbal  -KR      Provided to Patient  -KR      Level of Understanding Verbalized  -KR        User Key  (r) = Recorded By, (t) = Taken By, (c) = Cosigned By    Initials Name Provider Type    ROD Ross PT DPWENDY Physical Therapist                Time Calculation:   Start Time: 1115  Stop Time: 1200  Time Calculation (min): 45 min    Therapy Charges for Today     Code Description Service Date Service Provider Modifiers Qty    79870452570 HC PT MANUAL THERAPY EA 15 MIN 9/11/2017 Danyell Ross PT DPT GP 1    59044643144 HC PT THER PROC EA 15 MIN 9/11/2017 Danyell Ross PT DPT GP 2                    Danyell Ross PT DPT  9/11/2017

## 2017-09-13 ENCOUNTER — HOSPITAL ENCOUNTER (OUTPATIENT)
Dept: PHYSICAL THERAPY | Facility: HOSPITAL | Age: 27
Setting detail: THERAPIES SERIES
Discharge: HOME | End: 2017-09-13

## 2017-09-13 DIAGNOSIS — S46.911A RIGHT SHOULDER STRAIN, INITIAL ENCOUNTER: Primary | ICD-10-CM

## 2017-09-13 DIAGNOSIS — M25.511 ACUTE PAIN OF RIGHT SHOULDER: ICD-10-CM

## 2017-09-13 PROCEDURE — 97140 MANUAL THERAPY 1/> REGIONS: CPT | Performed by: PHYSICAL THERAPIST

## 2017-09-13 PROCEDURE — 97110 THERAPEUTIC EXERCISES: CPT | Performed by: PHYSICAL THERAPIST

## 2017-09-13 NOTE — THERAPY PROGRESS REPORT/RE-CERT
Outpatient Physical Therapy Ortho Progress Note  [facility]     Patient Name: Allyssa Holden  : 1990  MRN: [id number]  Today's Date: 2017      Visit Date: 2017    Visit Dx:    ICD-10-CM ICD-9-CM   1. Right shoulder strain, initial encounter S46.911A 840.9   2. Acute pain of right shoulder M25.511 719.41       There is no problem list on file for this patient.       Past Medical History:   Diagnosis Date   • ADD (attention deficit disorder)    • Anxiety    • Dementia    • Hypertension    • PTSD (post-traumatic stress disorder)         Past Surgical History:   Procedure Laterality Date   • APPENDECTOMY     •  SECTION                               PT Assessment/Plan       17 1110       PT Assessment    Functional Limitations Limitation in home management;Limitations in community activities;Performance in leisure activities;Performance in self-care ADL;Performance in work activities  -KR     Impairments Range of motion;Posture;Pain;Muscle strength;Poor body mechanics  -KR     Assessment Comments She is making good progress towards her goals, but still has limtied shoulder mobiltiy. Her rounded shoulder posture and weak cuff is continuing to cause a further anterior humeral glide limiting her ROM and increasing pan. We have discussed a more supporitve bra, and she is trying to find something she can afford.   -KR     Please refer to paper survey for additional self-reported information Yes  -KR     Rehab Potential Good  -KR     Patient/caregiver participated in establishment of treatment plan and goals Yes  -KR     Patient would benefit from skilled therapy intervention Yes  -KR     PT Plan    PT Frequency 2x/week;3x/week  -KR     Predicted Duration of Therapy Intervention (days/wks) 4-6 weeks  -KR     Planned CPT's? PT THER PROC EA 15 MIN: 06822;PT THER ACT EA 15 MIN: 66471;PT MANUAL THERAPY EA 15 MIN: 31468;PT NEUROMUSC RE-EDUCATION EA 15 MIN: 16815;PT ELECTRICAL STIM UNATTEND:  ;PT ELECTRICAL STIM ATTD EA 15 MIN: 77837  -KR     Physical Therapy Interventions (Optional Details) taping;swiss ball techniques;stretching;strengthening;stair training;ROM (Range of Motion);postural re-education;patient/family education;neuromuscular re-education;manual therapy techniques;joint mobilization;home exercise program  -KR     PT Plan Comments Continue to work posture, shoulder ROM and strengthening as tolerated. Progressing with against gravity activity.   -KR       User Key  (r) = Recorded By, (t) = Taken By, (c) = Cosigned By    Initials Name Provider Type    ROD Ross, PT DPT Physical Therapist                    Exercises       09/13/17 1110          Subjective Comments    Subjective Comments She reports being tired today.  She reports reaching behind back is still a struggle and reaching fully OH. She does report improvements overall though. She reports 40% better.  -KR      Subjective Pain    Able to rate subjective pain? yes  -KR      Pre-Treatment Pain Level 4  -KR      Post-Treatment Pain Level 4  -KR      Subjective Pain Comment R shoulder and shoulder blade  -KR      Exercise 1    Exercise Name 1 1/2 foam roll thoracic extension/pec stretch   -KR      Cueing 1 Verbal  -KR      Time (Minutes) 1 5  -KR      Exercise 2    Exercise Name 2 assessed all goals for re-cert  -KR      Exercise 3    Exercise Name 3 sidelying R ER  -KR      Cueing 3 Verbal;Tactile  -KR      Sets 3 2  -KR      Reps 3 20  -KR      Exercise 4    Exercise Name 4 hooklying unilateral horz abduction with red TB   -KR      Cueing 4 Verbal;Demo  -KR      Sets 4 3  -KR      Reps 4 10  -KR      Exercise 5    Exercise Name 5 CW/CCW ball on wall; at 90 degrees scaption  -KR      Cueing 5 Verbal  -KR        User Key  (r) = Recorded By, (t) = Taken By, (c) = Cosigned By    Initials Name Provider Type    ROD Ross, PT DPT Physical Therapist                        Manual Rx (last 36 hours)      Manual  Treatments       09/13/17 1110          Manual Rx 1    Manual Rx 1 Location R bicep anterior and anteriorlateral shoulder  -KR      Manual Rx 1 Grade mod  -KR      Manual Rx 1 Duration 10  -KR      Manual Rx 2    Manual Rx 2 Location 70 degrees PROM R shoulder Er  -KR        User Key  (r) = Recorded By, (t) = Taken By, (c) = Cosigned By    Initials Name Provider Type    ROD Ross, PT DPT Physical Therapist                PT OP Goals       09/13/17 1110       PT Short Term Goals    STG Date to Achieve 10/13/17  -KR     STG 1 Pt will report no pain greater than 3-4/10 with ADLs.  -KR     STG 1 Progress Ongoing;Partially Met  -KR     STG 1 Progress Comments 4/10 at rerst today  -KR     STG 2 Pt will demonstrated 120 or greater shouldler AROM in supine.   -KR     STG 2 Progress Progressing;Partially Met;Ongoing  -KR     STG 2 Progress Comments 120 seated burning pain in front of R shoulder  -KR     STG 3 Pt will score 30 or less on Quick Dash  -KR     STG 3 Progress Partially Met  -KR     STG 3 Progress Comments down to 40 from 50  -KR     Long Term Goals    LTG Date to Achieve 11/06/17  -KR     LTG 1 Pt will be able to reach behind head and back for hygene and other ADLs.   -KR     LTG 1 Progress Ongoing  -KR     LTG 1 Progress Comments still unable to reach behind back fully  -KR     LTG 2 Pt will have 120 or greater AROM shoulder flexion in sitting to improve reaching with household and work activities.   -KR     LTG 2 Progress Partially Met;Ongoing  -KR     LTG 2 Progress Comments 120 today AROM seated  -KR     LTG 3 Pt will have 4+/5 shoulder strength, globally in order to litft children ad perfrom other household and work activities.   -KR     LTG 3 Progress Ongoing  -KR     LTG 3 Progress Comments still limited with full ROM   -KR     LTG 4 Pt will score 10 or less on Quick Dash   -KR     LTG 4 Progress Ongoing  -KR     LTG 4 Progress Comments see STG 3  -KR     Time Calculation    PT Goal Re-Cert Due  Date 10/13/17  -KR       User Key  (r) = Recorded By, (t) = Taken By, (c) = Cosigned By    Initials Name Provider Type    ROD Ross PT DPT Physical Therapist                Therapy Education       09/13/17 1110          Therapy Education    Given HEP;Posture/body mechanics  -KR      Program Reinforced  -KR      How Provided Verbal  -KR      Provided to Patient  -KR      Level of Understanding Verbalized  -KR        User Key  (r) = Recorded By, (t) = Taken By, (c) = Cosigned By    Initials Name Provider Type    ROD Ross PT DPT Physical Therapist                Outcome Measures       09/13/17 1110          Quick DASH    Open a tight or new jar. 1  -KR      Do heavy household chores (e.g., wash walls, wash floors) 3  -KR      Carry a shopping bag or briefcase 2  -KR      Wash your back 5  -KR      Use a knife to cut food 1  -KR      Recreational activities in which you take some force or impact through your arm, should or hand (e.g. golf, hammering, tennis, etc.) 4  -KR      During the past week, to what extent has your arm, shoulder, or hand problem interfered with your normal social activites with family, friends, neighbors or groups? 3  -KR      During the past week, were you limited in your work or other regular daily activities as a result of your arm, shoulder or hand problem? 3  -KR      Arm, Shoulder, or hand pain 3  -KR      Tingling (pins and needles) in your arm, shoulder, or hand 2  -KR      During the past week, how much difficulty have you had sleeping because of the pain in your arm, shoulder or hand? 2  -KR      Number of Questions Answered 11  -KR      Quick DASH Score 40.91  -KR      Functional Assessment    Outcome Measure Options Quick DASH  -KR        User Key  (r) = Recorded By, (t) = Taken By, (c) = Cosigned By    Initials Name Provider Type    ROD Ross PT DPT Physical Therapist            Time Calculation:   Start Time: 1110  Stop Time: 1157  Time  Calculation (min): 47 min  Total Timed Code Minutes- PT: 47 minute(s)    Therapy Charges for Today     Code Description Service Date Service Provider Modifiers Qty    76555729207 HC PT THER PROC EA 15 MIN 9/13/2017 Danyell Ross, PT DPT GP 2    07758502421 HC PT MANUAL THERAPY EA 15 MIN 9/13/2017 Danyell Ross, PT DPT GP 1          PT G-Codes  Outcome Measure Options: Quick DASH         Danyell Ross, PT DPT  9/13/2017

## 2017-09-15 ENCOUNTER — HOSPITAL ENCOUNTER (OUTPATIENT)
Dept: PHYSICAL THERAPY | Facility: HOSPITAL | Age: 27
Setting detail: THERAPIES SERIES
End: 2017-09-15

## 2017-09-18 ENCOUNTER — HOSPITAL ENCOUNTER (OUTPATIENT)
Dept: PHYSICAL THERAPY | Facility: HOSPITAL | Age: 27
Setting detail: THERAPIES SERIES
Discharge: HOME | End: 2017-09-18

## 2017-09-18 DIAGNOSIS — S46.911A RIGHT SHOULDER STRAIN, INITIAL ENCOUNTER: Primary | ICD-10-CM

## 2017-09-18 DIAGNOSIS — M25.511 ACUTE PAIN OF RIGHT SHOULDER: ICD-10-CM

## 2017-09-18 PROCEDURE — 97110 THERAPEUTIC EXERCISES: CPT

## 2017-09-18 PROCEDURE — 97140 MANUAL THERAPY 1/> REGIONS: CPT

## 2017-09-18 NOTE — THERAPY TREATMENT NOTE
"    Outpatient Physical Therapy Ortho Treatment Note  [facility]     Patient Name: Allyssa Holden  : 1990  MRN: [id number]  Today's Date: 2017      Visit Date: 2017    Visit Dx:    ICD-10-CM ICD-9-CM   1. Right shoulder strain, initial encounter S46.911A 840.9   2. Acute pain of right shoulder M25.511 719.41       There is no problem list on file for this patient.       Past Medical History:   Diagnosis Date   • ADD (attention deficit disorder)    • Anxiety    • Dementia    • Hypertension    • PTSD (post-traumatic stress disorder)         Past Surgical History:   Procedure Laterality Date   • APPENDECTOMY     •  SECTION                               PT Assessment/Plan       17 1208       PT Assessment    Assessment Comments She has had a set back due to poor lifting mechanics recently at work. Her IR is very restricted and not WFL. She exhibited weakness and difficulty with prone W's and was a bit guarded in her R mid thoracic and upper trap regions prior to intervention.  -EC     PT Plan    PT Plan Comments Continue to focus on patient education, postural control with scapular stabilizations.  -EC       User Key  (r) = Recorded By, (t) = Taken By, (c) = Cosigned By    Initials Name Provider Type    EC Spike Estrada PTA Physical Therapy Assistant                    Exercises       17 1100          Subjective Comments    Subjective Comments Reports Pain in her R shoulder and shoulder bad, states she made some really bad decisions at work and her shoulder hurts more  -EC      Subjective Pain    Able to rate subjective pain? yes  -EC      Pre-Treatment Pain Level 5  -EC      Post-Treatment Pain Level 4  -EC      Exercise 1    Exercise Name 1 prone \"W's\"  -EC      Reps 1 10  -EC      Exercise 2    Exercise Name 2 R IR/ER stretches in closed pack, loose pack with Grade 1 LAD, and in scation stretches  -EC      Exercise 3    Exercise Name 3 manual R pec stretches  -EC      " Exercise 4    Exercise Name 4 supine CW/CCW  -EC      Time (Minutes) 4 1  -EC      Additional Comments #1  -EC      Exercise 5    Exercise Name 5 isometric shoulder flexion in supine at 45, 90, and 120  with manual perturbations  -EC      Sets 5 3  -EC      Time (Seconds) 5 30  -EC      Additional Comments #1  -EC        User Key  (r) = Recorded By, (t) = Taken By, (c) = Cosigned By    Initials Name Provider Type    EC Spike Estrada PTA Physical Therapy Assistant                        Manual Rx (last 36 hours)      Manual Treatments       09/18/17 1100          Manual Rx 1    Manual Rx 1 Location thoracic  -EC      Manual Rx 1 Type prone extension mobilizations  -EC      Manual Rx 1 Grade 2 and 3  -EC      Manual Rx 1 Duration 8  -EC      Manual Rx 2    Manual Rx 2 Location R upper trap rhomboids, and middle trap  -EC      Manual Rx 2 Type STM in prone  -EC      Manual Rx 2 Duration 9  -EC        User Key  (r) = Recorded By, (t) = Taken By, (c) = Cosigned By    Initials Name Provider Type    EC Spike Estrada PTA Physical Therapy Assistant                PT OP Goals       09/18/17 1116       PT Short Term Goals    STG Date to Achieve 10/13/17  -EC     STG 1 Pt will report no pain greater than 3-4/10 with ADLs.  -EC     STG 1 Progress Ongoing  -EC     STG 2 Pt will demonstrated 120 or greater shouldler AROM in supine.   -EC     STG 2 Progress Ongoing  -EC     STG 3 Pt will score 30 or less on Quick Dash  -EC     STG 3 Progress Partially Met  -EC     Long Term Goals    LTG Date to Achieve 11/06/17  -EC     LTG 1 Pt will be able to reach behind head and back for hygene and other ADLs.   -EC     LTG 1 Progress Ongoing  -EC     LTG 1 Progress Comments 83 degrees ER, 33 degrees IR PROM post stretching  -EC     LTG 2 Pt will have 120 or greater AROM shoulder flexion in sitting to improve reaching with household and work activities.   -EC     LTG 2 Progress Ongoing  -EC     LTG 3 Pt will have 4+/5 shoulder strength,  globally in order to litft children ad perfrom other household and work activities.   -EC     LTG 3 Progress Ongoing  -EC     LTG 4 Pt will score 10 or less on Quick Dash   -EC     LTG 4 Progress Ongoing  -EC     Time Calculation    PT Goal Re-Cert Due Date 10/13/17  -EC       User Key  (r) = Recorded By, (t) = Taken By, (c) = Cosigned By    Initials Name Provider Type    EC Spike Estrada PTA Physical Therapy Assistant                Therapy Education       09/18/17 1208          Therapy Education    Given Posture/body mechanics  -EC      How Provided Verbal  -EC      Provided to Patient  -EC      Level of Understanding Verbalized  -EC        User Key  (r) = Recorded By, (t) = Taken By, (c) = Cosigned By    Initials Name Provider Type    EC Spike Estrada PTA Physical Therapy Assistant                Time Calculation:   Start Time: 1119  Stop Time: 1200  Time Calculation (min): 41 min  Total Timed Code Minutes- PT: 41 minute(s)    Therapy Charges for Today     Code Description Service Date Service Provider Modifiers Qty    58874891770 HC PT MANUAL THERAPY EA 15 MIN 9/18/2017 Spike Estrada PTA GP 1    26666769630 HC PT THER PROC EA 15 MIN 9/18/2017 Spike Estrada PTA GP 2                    Spike Estrada PTA  9/18/2017

## 2017-09-20 ENCOUNTER — HOSPITAL ENCOUNTER (OUTPATIENT)
Dept: PHYSICAL THERAPY | Facility: HOSPITAL | Age: 27
Setting detail: THERAPIES SERIES
Discharge: HOME | End: 2017-09-20

## 2017-09-20 DIAGNOSIS — M25.511 ACUTE PAIN OF RIGHT SHOULDER: ICD-10-CM

## 2017-09-20 DIAGNOSIS — S46.911A RIGHT SHOULDER STRAIN, INITIAL ENCOUNTER: Primary | ICD-10-CM

## 2017-09-20 PROCEDURE — 97110 THERAPEUTIC EXERCISES: CPT | Performed by: PHYSICAL THERAPIST

## 2017-09-20 PROCEDURE — 97140 MANUAL THERAPY 1/> REGIONS: CPT | Performed by: PHYSICAL THERAPIST

## 2017-09-20 NOTE — THERAPY TREATMENT NOTE
Outpatient Physical Therapy Ortho Progress Note  [facility]     Patient Name: Allyssa Holden  : 1990  MRN: [id number]  Today's Date: 2017      Visit Date: 2017    Visit Dx:    ICD-10-CM ICD-9-CM   1. Right shoulder strain, initial encounter S46.911A 840.9   2. Acute pain of right shoulder M25.511 719.41       There is no problem list on file for this patient.       Past Medical History:   Diagnosis Date   • ADD (attention deficit disorder)    • Anxiety    • Dementia    • Hypertension    • PTSD (post-traumatic stress disorder)         Past Surgical History:   Procedure Laterality Date   • APPENDECTOMY     •  SECTION                               PT Assessment/Plan       17 1115       PT Assessment    Functional Limitations Limitation in home management;Limitations in community activities;Performance in leisure activities;Performance in self-care ADL;Performance in work activities  -KR     Impairments Muscle strength;Pain;Poor body mechanics;Posture;Range of motion  -KR     Assessment Comments She showed slight improvement in her AROM today, but is still limited with full AROM, especially against gravity. She had recent flare and is continuing to guard her RUE against her body. Her posture and increased chest size continues to play a roll in her shoulder dysfunction.   -KR     Please refer to paper survey for additional self-reported information Yes  -KR     Rehab Potential Good  -KR     Patient/caregiver participated in establishment of treatment plan and goals Yes  -KR     Patient would benefit from skilled therapy intervention Yes  -KR     PT Plan    PT Frequency 2x/week;3x/week  -KR     Predicted Duration of Therapy Intervention (days/wks) 4-6 weeks  -KR     Planned CPT's? PT THER PROC EA 15 MIN: 00304;PT MANUAL THERAPY EA 15 MIN: 47543;PT THER ACT EA 15 MIN: 28906;PT NEUROMUSC RE-EDUCATION EA 15 MIN: 51997;PT ELECTRICAL STIM UNATTEND: ;PT ELECTRICAL STIM ATTD EA 15 MIN:  03208  -KR     Physical Therapy Interventions (Optional Details) swiss ball techniques;stretching;strengthening;stair training;ROM (Range of Motion);postural re-education;patient/family education;neuromuscular re-education;modalities;manual therapy techniques;joint mobilization;home exercise program  -KR     PT Plan Comments Continue to work on shoulder ROM, progressing strengthening, stabilization and posture.   -KR       User Key  (r) = Recorded By, (t) = Taken By, (c) = Cosigned By    Initials Name Provider Type    ROD Ross, PT DPT Physical Therapist                    Exercises       09/20/17 1115          Subjective Comments    Subjective Comments she reports reccent flare and it was her fault. She reports beingn busy at work on Sunday and had to lift/moving cases of drinks and started having pain in her R shoulder, she reports stretching it and felt a pop and couldn't move her shoulder for 20-30 minutes. She reports more constant pain since then. Pain in shoulder and shoulder blade. Reaching OH has gotten better.   -KR      Subjective Pain    Able to rate subjective pain? yes  -KR      Pre-Treatment Pain Level --   4.5  -KR      Post-Treatment Pain Level 4  -KR      Exercise 1    Exercise Name 1 addressed all goals for more auths  -KR      Exercise 2    Exercise Name 2 R shoulder Flexion 125 ER 38 IR 35 AROM supine  -KR      Exercise 3    Exercise Name 3 prone W's  -KR      Sets 3 3  -KR      Reps 3 10  -KR      Exercise 4    Exercise Name 4 1/2 foam with horz abduction   -KR      Sets 4 3  -KR      Reps 4 10  -KR      Additional Comments yellow TB  -KR      Exercise 5    Exercise Name 5 1/2 foam roll pec stretch hands on forehead  -KR      Reps 5 2  -KR      Time (Seconds) 5 20 sec holds  -KR      Additional Comments felt a stretching pain  -KR        User Key  (r) = Recorded By, (t) = Taken By, (c) = Cosigned By    Initials Name Provider Type    ROD Ross, PT DPT Physical Therapist                         Manual Rx (last 36 hours)      Manual Treatments       09/20/17 1115          Manual Rx 1    Manual Rx 1 Location R bicep anterior and anteriorlateral shoulder  -KR      Manual Rx 1 Type STM  -KR      Manual Rx 1 Grade mod  -KR      Manual Rx 1 Duration 10  -KR      Manual Rx 2    Manual Rx 2 Location prone thoracic   -KR      Manual Rx 2 Type UPAs R>R  -KR      Manual Rx 2 Grade 2/3   no cavitations  -KR      Manual Rx 2 Duration 5  -KR        User Key  (r) = Recorded By, (t) = Taken By, (c) = Cosigned By    Initials Name Provider Type    ROD Ross, PT DPT Physical Therapist                PT OP Goals       09/20/17 1115       PT Short Term Goals    STG Date to Achieve 10/11/17  -KR     STG 1 Pt will report no pain greater than 3-4/10 with ADLs.  -KR     STG 1 Progress Ongoing  -KR     STG 1 Progress Comments constant pain for 4-4.5/10 since lifting/stretching incident on Sunday.   -KR     STG 2 Pt will demonstrated 120 or greater shouldler AROM in supine.   -KR     STG 2 Progress Met  -KR     STG 2 Progress Comments 125 supine  -KR     STG 3 Pt will score 30 or less on Quick Dash  -KR     STG 3 Progress Partially Met  -KR     STG 3 Progress Comments 45 today  -KR     Long Term Goals    LTG Date to Achieve 11/01/17  -KR     LTG 1 Pt will be able to reach behind head and back for hygene and other ADLs.   -KR     LTG 1 Progress Ongoing  -KR     LTG 1 Progress Comments ER 38 IR 35 AROM; to ear for behind head; to lateral iliac creast for behind back   -KR     LTG 2 Pt will have 120 or greater AROM shoulder flexion in sitting to improve reaching with household and work activities.   -KR     LTG 2 Progress Ongoing;Partially Met  -KR     LTG 2 Progress Comments 125 supine  -KR     LTG 3 Pt will have 4+/5 shoulder strength, globally in order to litft children ad perfrom other household and work activities.   -KR     LTG 3 Progress Ongoing  -KR     LTG 3 Progress Comments still  limtied with full ROM (2-/5)   -KR     LTG 4 Pt will score 10 or less on Quick Dash   -KR     LTG 4 Progress Ongoing  -KR     LTG 4 Progress Comments see STG 3  -KR     Time Calculation    PT Goal Re-Cert Due Date 10/20/17  -KR       User Key  (r) = Recorded By, (t) = Taken By, (c) = Cosigned By    Initials Name Provider Type    ROD Ross PT DPT Physical Therapist                Therapy Education       09/20/17 1115          Therapy Education    Given Symptoms/condition management;Posture/body mechanics  -KR      Program Reinforced  -KR      How Provided Verbal  -KR      Provided to Patient  -KR      Level of Understanding Verbalized  -KR        User Key  (r) = Recorded By, (t) = Taken By, (c) = Cosigned By    Initials Name Provider Type    ROD Ross, PT DPT Physical Therapist                Outcome Measures       09/20/17 1115          Quick DASH    Open a tight or new jar. 1  -KR      Do heavy household chores (e.g., wash walls, wash floors) 3  -KR      Carry a shopping bag or briefcase 3  -KR      Wash your back 4  -KR      Use a knife to cut food 2  -KR      Recreational activities in which you take some force or impact through your arm, should or hand (e.g. golf, hammering, tennis, etc.) 4  -KR      During the past week, to what extent has your arm, shoulder, or hand problem interfered with your normal social activites with family, friends, neighbors or groups? 3  -KR      During the past week, were you limited in your work or other regular daily activities as a result of your arm, shoulder or hand problem? 3  -KR      Arm, Shoulder, or hand pain 3  -KR      Tingling (pins and needles) in your arm, shoulder, or hand 2  -KR      During the past week, how much difficulty have you had sleeping because of the pain in your arm, shoulder or hand? 3  -KR      Number of Questions Answered 11  -KR      Quick DASH Score 45.45  -KR      Work Module (Optional)    Using your usual technique for your  work? 3  -KR      Doing your usual work because of arm, shoulder or hand pain? 3  -KR      Doing your work as well as you would like? 3  -KR      Spending your usual amount of time doing your work? 3  -KR      Work Module Score 50  -KR      Functional Assessment    Outcome Measure Options Dynamic Gait Index;Quick DASH  -KR        User Key  (r) = Recorded By, (t) = Taken By, (c) = Cosigned By    Initials Name Provider Type    KR Danyell Ross, PT DPT Physical Therapist            Time Calculation:   Start Time: 1115  Stop Time: 1212  Time Calculation (min): 57 min  Total Timed Code Minutes- PT: 55 minute(s)    Therapy Charges for Today     Code Description Service Date Service Provider Modifiers Qty    00168316786 HC PT MANUAL THERAPY EA 15 MIN 9/20/2017 Danyell Ross, PT DPT GP 1    83129585948 HC PT THER PROC EA 15 MIN 9/20/2017 Danyell Ross, PT DPT GP 3          PT G-Codes  Outcome Measure Options: Dynamic Gait Index, Quick DASH         Danyell Ross, PT DPT  9/20/2017

## 2017-10-10 ENCOUNTER — HOSPITAL ENCOUNTER (OUTPATIENT)
Dept: PHYSICAL THERAPY | Facility: HOSPITAL | Age: 27
Setting detail: THERAPIES SERIES
Discharge: HOME | End: 2017-10-10

## 2017-10-10 DIAGNOSIS — S46.911A RIGHT SHOULDER STRAIN, INITIAL ENCOUNTER: Primary | ICD-10-CM

## 2017-10-10 DIAGNOSIS — M25.511 ACUTE PAIN OF RIGHT SHOULDER: ICD-10-CM

## 2017-10-10 PROCEDURE — 97110 THERAPEUTIC EXERCISES: CPT

## 2017-10-18 ENCOUNTER — HOSPITAL ENCOUNTER (OUTPATIENT)
Dept: PHYSICAL THERAPY | Facility: HOSPITAL | Age: 27
Setting detail: THERAPIES SERIES
Discharge: HOME | End: 2017-10-18

## 2017-10-18 DIAGNOSIS — S46.911A RIGHT SHOULDER STRAIN, INITIAL ENCOUNTER: Primary | ICD-10-CM

## 2017-10-18 DIAGNOSIS — M25.511 ACUTE PAIN OF RIGHT SHOULDER: ICD-10-CM

## 2017-10-18 PROCEDURE — 97110 THERAPEUTIC EXERCISES: CPT

## 2017-10-18 PROCEDURE — 97140 MANUAL THERAPY 1/> REGIONS: CPT

## 2017-10-18 NOTE — THERAPY TREATMENT NOTE
Outpatient Physical Therapy Ortho Treatment Note  [facility]     Patient Name: Allyssa Holden  : 1990  MRN: [id number]  Today's Date: 10/18/2017      Visit Date: 10/18/2017    Visit Dx:    ICD-10-CM ICD-9-CM   1. Right shoulder strain, initial encounter S46.911A 840.9   2. Acute pain of right shoulder M25.511 719.41       There is no problem list on file for this patient.       Past Medical History:   Diagnosis Date   • ADD (attention deficit disorder)    • Anxiety    • Dementia    • Hypertension    • PTSD (post-traumatic stress disorder)         Past Surgical History:   Procedure Laterality Date   • APPENDECTOMY     •  SECTION                               PT Assessment/Plan       10/18/17 0936       PT Assessment    Assessment Comments Patient walked into the clinic with holding her arm in a guarded position.  She reports her pain is staying about the same.  Her continual forward shoulder posture is a continual contributing factor to her poor mechanics.  She only has a few more visits scheduled at this time, and most likely she will need to follow up with her Dr. due to not improving as quickly as we hope this far with therapy.  She continues to have proximal weakness, especially in her posterior cuff musculature.  -YEFRI     PT Plan    PT Plan Comments We will continue to progress with postural education and proximal strengthening while not causing a flare.  -YEFRI       User Key  (r) = Recorded By, (t) = Taken By, (c) = Cosigned By    Initials Name Provider Type    YEFRI Jensen, PAULIE Physical Therapy Assistant                    Exercises       10/18/17 0991          Subjective Comments    Subjective Comments Patient reports she is exhausted this morning due to cleaning the house into this morning.  She reports her shoulder is still at a 4/10 and doesn't get less than a 3/10.  She reports it is always hurting but some days it is better and she can do more.    She reports it is a day by day  thing at this point.  -YEFRI      Subjective Pain    Able to rate subjective pain? yes  -YEFRI      Pre-Treatment Pain Level 4  -YEFRI      Post-Treatment Pain Level 5   achiness  -YEFRI      Exercise 1    Exercise Name 1 wand flexion to about 90; focuse on preventing shrugging of the shoulder  -YEFRI      Cueing 1 Verbal;Tactile  -YEFRI      Sets 1 2  -YEFRI      Reps 1 15  -YEFRI      Additional Comments #2.  She does report ache in shoulder with this motion  -YEFRI      Exercise 2    Exercise Name 2 isometric ER/IR holds with manual min OP: arm at 45 degrees of abduction  -YEFRI      Cueing 2 Verbal;Tactile  -YEFRI      Sets 2 2  -YEFRI      Time (Seconds) 2 2 minutes each  -YEFRI      Additional Comments arm supported by towel roll  -YEFRI      Exercise 3    Exercise Name 3 modified wall push up:hands on wall and taking steps to wall and then keeping hands on wall while stepping back from wall  -YEFRI      Cueing 3 Verbal;Tactile  -YEFRI      Sets 3 2  -YEFRI      Reps 3 10  -YEFRI      Additional Comments added to HEP  -YEFRI      Exercise 4    Exercise Name 4 seated W's but increased pain  -YEFRI      Exercise 5    Exercise Name 5 standing cw/ccw at wall:  90 shoulder flexion and 90 degrees scaption  -YEFRI      Cueing 5 Verbal  -YEFRI      Sets 5 2  -YEFRI      Time (Minutes) 5 1 minute each position  -YEFRI      Additional Comments with small ball  -YEFRI        User Key  (r) = Recorded By, (t) = Taken By, (c) = Cosigned By    Initials Name Provider Type    YEFRI Jensen, PAULIE Physical Therapy Assistant                        Manual Rx (last 36 hours)      Manual Treatments       10/18/17 0941          Manual Rx 1    Manual Rx 1 Location prone STM to UT  -YEFRI      Manual Rx 1 Type STM  -YEFRI      Manual Rx 1 Grade mod  -YEFRI      Manual Rx 1 Duration 5  -YEFRI      Manual Rx 2    Manual Rx 2 Location prone thoracic  -YEFRI      Manual Rx 2 Type extension mobilizations  -YEFRI      Manual Rx 2 Grade 2 sustained  -YEFRI      Manual Rx 2 Duration 3  -YEFRI      Manual Rx 3    Manual Rx 3 Location  hooklying anterior shoulder, biceps, and lateral shoulder  -YEFRI      Manual Rx 3 Type STM  -YEFRI      Manual Rx 3 Grade min  -YEFRI      Manual Rx 3 Duration 2  -YEFRI        User Key  (r) = Recorded By, (t) = Taken By, (c) = Cosigned By    Initials Name Provider Type    YEFRI Jensen PTA Physical Therapy Assistant                PT OP Goals       10/18/17 0935       PT Short Term Goals    STG Date to Achieve 10/11/17  -YEFRI     STG 1 Pt will report no pain greater than 3-4/10 with ADLs.  -YEFRI     STG 1 Progress Ongoing  -YEFRI     STG 1 Progress Comments 4/10 today prior to treatment and slightly increased to 5/10 post treatment  -YEFRI     STG 2 Pt will demonstrated 120 or greater shouldler AROM in supine.   -YEFRI     STG 2 Progress Met  -YEFRI     STG 3 Pt will score 30 or less on Quick Dash  -YEFRI     STG 3 Progress Partially Met  -YEFRI     Long Term Goals    LTG Date to Achieve 11/01/17  -YEFRI     LTG 1 Pt will be able to reach behind head and back for hygene and other ADLs.   -YEFRI     LTG 1 Progress Ongoing  -YEFRI     LTG 1 Progress Comments She reports she can't reach behind her back still and has difficulty with fixing her hair  -YEFRI     LTG 2 Pt will have 120 or greater AROM shoulder flexion in sitting to improve reaching with household and work activities.   -YEFRI     LTG 2 Progress Ongoing  -YEFRI     LTG 3 Pt will have 4+/5 shoulder strength, globally in order to litft children ad perfrom other household and work activities.   -YEFRI     LTG 3 Progress Ongoing  -YEFRI     LTG 4 Pt will score 10 or less on Quick Dash   -YEFRI     LTG 4 Progress Ongoing  -YEFRI     Time Calculation    PT Goal Re-Cert Due Date 11/09/17  -YEFRI       User Key  (r) = Recorded By, (t) = Taken By, (c) = Cosigned By    Initials Name Provider Type    YEFRI Jensen PTA Physical Therapy Assistant                Therapy Education       10/18/17 1321          Therapy Education    Education Details Modified wall push up: hands on wall while taking steps to wall and back   -YEFRI      Given HEP  -YEFRI      Program New  -YEFRI      How Provided Verbal;Demonstration  -YEFRI      Provided to Patient  -YEFRI      Level of Understanding Verbalized;Demonstrated  -YEFRI        User Key  (r) = Recorded By, (t) = Taken By, (c) = Cosigned By    Initials Name Provider Type    YEFRI Jensen PTA Physical Therapy Assistant                Time Calculation:   Start Time: 0935  Stop Time: 1015  Time Calculation (min): 40 min  Total Timed Code Minutes- PT: 40 minute(s)    Therapy Charges for Today     Code Description Service Date Service Provider Modifiers Qty    90225894222 HC PT MANUAL THERAPY EA 15 MIN 10/18/2017 Coleman Jensen PTA GP 1    04173605228 HC PT THER PROC EA 15 MIN 10/18/2017 Coleman Jensen PTA GP 2                    Coleman Jensen PTA  10/18/2017

## 2017-10-23 ENCOUNTER — HOSPITAL ENCOUNTER (OUTPATIENT)
Dept: PHYSICAL THERAPY | Facility: HOSPITAL | Age: 27
Setting detail: THERAPIES SERIES
Discharge: HOME | End: 2017-10-23

## 2017-10-23 DIAGNOSIS — S46.911A RIGHT SHOULDER STRAIN, INITIAL ENCOUNTER: Primary | ICD-10-CM

## 2017-10-23 DIAGNOSIS — M25.511 ACUTE PAIN OF RIGHT SHOULDER: ICD-10-CM

## 2017-10-23 PROCEDURE — 97110 THERAPEUTIC EXERCISES: CPT | Performed by: PHYSICAL THERAPIST

## 2017-10-23 PROCEDURE — 97140 MANUAL THERAPY 1/> REGIONS: CPT | Performed by: PHYSICAL THERAPIST

## 2017-10-23 NOTE — THERAPY TREATMENT NOTE
Outpatient Physical Therapy Ortho Treatment Note  [facility]     Patient Name: Allyssa Holden  : 1990  MRN: [id number]  Today's Date: 10/23/2017      Visit Date: 10/23/2017    Visit Dx:    ICD-10-CM ICD-9-CM   1. Right shoulder strain, initial encounter S46.911A 840.9   2. Acute pain of right shoulder M25.511 719.41       There is no problem list on file for this patient.       Past Medical History:   Diagnosis Date   • ADD (attention deficit disorder)    • Anxiety    • Dementia    • Hypertension    • PTSD (post-traumatic stress disorder)         Past Surgical History:   Procedure Laterality Date   • APPENDECTOMY     •  SECTION                               PT Assessment/Plan       10/23/17 0930       PT Assessment    Assessment Comments She continues to keep her R arm in a guarded position. She overall is reporting 60% improvement, but is still reporting higher pain ratings that have not improved the last several visits. Her motion has overall improved, but she appears to have plateaud the last several visits. She is still limited with full shoulder mobiltiy.   -KR     PT Plan    PT Plan Comments We are going to hold for now until she returns to her MD for further care.   -ROD       User Key  (r) = Recorded By, (t) = Taken By, (c) = Cosigned By    Initials Name Provider Type    ROD Ross, PT DPT Physical Therapist                    Exercises       10/23/17 0930          Subjective Comments    Subjective Comments She reports doing something she wasn't supposed to the other day. She reports still hurting. she reports the door to the [employer] at work fell off, so she reports she had to help carry it to the back of the store. She reports increased pain that day for 4-5 hours, but it did get better. She reports pain no less than 3/10 every day. She reports 6-7/10 that day. she reports trying to get an appointment with Dr. Cuba, but has had difficulty secondary to her schedule. She  reports almost 2 years since initially injuring shoulder and never completely resolved after the first injury. She reports about 60% improvement.   -KR      Subjective Pain    Able to rate subjective pain? yes  -KR      Pre-Treatment Pain Level --   3 or 4 /10  -KR      Subjective Pain Comment anterior/lateral shoulder   -KR      Exercise 1    Exercise Name 1 hooklying on 1/2 foam intermittent pec stretch   -KR      Cueing 1 Verbal  -KR      Sets 1 3  -KR      Time (Seconds) 1 30 sec   -KR      Exercise 2    Exercise Name 2 prone W's  -KR      Cueing 2 Verbal  -KR      Sets 2 2  -KR      Reps 2 15  -KR      Exercise 3    Exercise Name 3 standing pot stirs with SB CW/CCW  -KR      Cueing 3 Verbal;Demo  -KR      Sets 3 2  -KR      Reps 3 10  -KR      Exercise 4    Exercise Name 4 discussed POC, on hold, and continued work on her posture and HEP.   -KR        User Key  (r) = Recorded By, (t) = Taken By, (c) = Cosigned By    Initials Name Provider Type    ROD Ross, PT DPT Physical Therapist                        Manual Rx (last 36 hours)      Manual Treatments       10/23/17 0930          Manual Rx 1    Manual Rx 1 Location STM UT/LS and rhomboid (R)  -KR      Manual Rx 1 Grade mod  -KR      Manual Rx 1 Duration 4  -KR      Manual Rx 2    Manual Rx 2 Location prone thoracic  -KR      Manual Rx 2 Type UPAs  -KR      Manual Rx 2 Grade 2/3   no cavitations  -KR      Manual Rx 2 Duration 5  -KR        User Key  (r) = Recorded By, (t) = Taken By, (c) = Cosigned By    Initials Name Provider Type    ROD Ross, PT DPT Physical Therapist                PT OP Goals       10/23/17 0930       PT Short Term Goals    STG Date to Achieve 10/11/17  -KR     STG 1 Pt will report no pain greater than 3-4/10 with ADLs.  -KR     STG 1 Progress Ongoing  -KR     STG 1 Progress Comments up to 6-7/10 after lifting this weekend, does not go belower 3/10  -KR     STG 2 Pt will demonstrated 120 or greater shouldler AROM  in supine.   -KR     STG 2 Progress Met  -KR     STG 3 Pt will score 30 or less on Quick Dash  -KR     STG 3 Progress Partially Met  -KR     Long Term Goals    LTG Date to Achieve 11/01/17  -KR     LTG 1 Pt will be able to reach behind head and back for hygene and other ADLs.   -KR     LTG 1 Progress Ongoing;Partially Met  -KR     LTG 1 Progress Comments able to reach behind head to perform hair hygene, but unable to reach behing back.   -KR     LTG 2 Pt will have 120 or greater AROM shoulder flexion in sitting to improve reaching with household and work activities.   -KR     LTG 2 Progress Met  -KR     LTG 2 Progress Comments 125 today  -KR     LTG 3 Pt will have 4+/5 shoulder strength, globally in order to litft children ad perfrom other household and work activities.   -KR     LTG 3 Progress Ongoing  -KR     LTG 3 Progress Comments unable to fully move against gravity  -KR     LTG 4 Pt will score 10 or less on Quick Dash   -KR     LTG 4 Progress Ongoing  -KR     Time Calculation    PT Goal Re-Cert Due Date 11/09/17  -KR       User Key  (r) = Recorded By, (t) = Taken By, (c) = Cosigned By    Initials Name Provider Type    ROD Ross PT DPT Physical Therapist                Therapy Education       10/23/17 0930          Therapy Education    Given HEP;Posture/body mechanics  -KR      Program Reinforced  -KR      How Provided Verbal  -KR      Provided to Patient  -KR      Level of Understanding Verbalized  -KR        User Key  (r) = Recorded By, (t) = Taken By, (c) = Cosigned By    Initials Name Provider Type    ROD Ross PT DPT Physical Therapist                Time Calculation:   Start Time: 0930  Stop Time: 1015  Time Calculation (min): 45 min  Total Timed Code Minutes- PT: 45 minute(s)    Therapy Charges for Today     Code Description Service Date Service Provider Modifiers Qty    60156741722 HC PT MANUAL THERAPY EA 15 MIN 10/23/2017 Danyell Ross PT DPT GP 1    85234136065 HC PT  THER PROC EA 15 MIN 10/23/2017 Danyell Ross, PT DPT GP 2                    Danyell Ross, PT DPT  10/23/2017

## 2017-10-30 ENCOUNTER — APPOINTMENT (OUTPATIENT)
Dept: PHYSICAL THERAPY | Facility: HOSPITAL | Age: 27
End: 2017-10-30

## 2017-11-21 ENCOUNTER — DOCUMENTATION (OUTPATIENT)
Dept: PHYSICAL THERAPY | Facility: HOSPITAL | Age: 27
End: 2017-11-21

## 2017-11-21 DIAGNOSIS — M25.511 ACUTE PAIN OF RIGHT SHOULDER: ICD-10-CM

## 2017-11-21 DIAGNOSIS — S46.911A RIGHT SHOULDER STRAIN, INITIAL ENCOUNTER: Primary | ICD-10-CM

## 2017-11-21 NOTE — THERAPY DISCHARGE NOTE
Outpatient Physical Therapy Discharge Summary         Patient Name: Allyssa Holden  : 1990  MRN: [id number]    Today's Date: 2017    Visit Dx:    ICD-10-CM ICD-9-CM   1. Right shoulder strain, initial encounter S46.911A 840.9   2. Acute pain of right shoulder M25.511 719.41             PT OP Goals       17 0954       PT Short Term Goals    STG Date to Achieve 10/11/17  -KR     STG 1 Pt will report no pain greater than 3-4/10 with ADLs.  -KR     STG 1 Progress Not Met  -KR     STG 2 Pt will demonstrated 120 or greater shouldler AROM in supine.   -KR     STG 2 Progress Met  -KR     STG 3 Pt will score 30 or less on Quick Dash  -KR     STG 3 Progress Partially Met  -KR     Long Term Goals    LTG Date to Achieve 17  -KR     LTG 1 Pt will be able to reach behind head and back for hygene and other ADLs.   -KR     LTG 1 Progress Partially Met  -KR     LTG 2 Pt will have 120 or greater AROM shoulder flexion in sitting to improve reaching with household and work activities.   -KR     LTG 2 Progress Met  -KR     LTG 3 Pt will have 4+/5 shoulder strength, globally in order to litft children ad perfrom other household and work activities.   -KR     LTG 3 Progress Not Met  -KR     LTG 4 Pt will score 10 or less on Quick Dash   -KR     LTG 4 Progress Not Met  -KR       User Key  (r) = Recorded By, (t) = Taken By, (c) = Cosigned By    Initials Name Provider Type    ROD Ross, PT DPT Physical Therapist          OP PT Discharge Summary  Date of Discharge: 17  Reason for Discharge: Maximum functional potential achieved  Outcomes Achieved: Patient able to partially acheive established goals  Discharge Destination: Home with home program  Discharge Instructions: She continues to keep her R arm in a guarded position. She overall is reporting 60% improvement, but is still reporting higher pain ratings that have not improved the last several visits. Her motion has overall improved, but  she appears to have plateaud the last several visits. She is still limited with full shoulder mobiltiy. She had met two goals and partially met two others.       Time Calculation:                    Danyell Ross, PT DPT  11/21/2017

## 2018-05-12 ENCOUNTER — OFFICE VISIT (OUTPATIENT)
Dept: URGENT CARE | Age: 28
End: 2018-05-12
Payer: MEDICAID

## 2018-05-12 ENCOUNTER — HOSPITAL ENCOUNTER (OUTPATIENT)
Dept: GENERAL RADIOLOGY | Age: 28
Discharge: HOME | End: 2018-05-12
Payer: MEDICAID

## 2018-05-12 VITALS
RESPIRATION RATE: 22 BRPM | SYSTOLIC BLOOD PRESSURE: 128 MMHG | OXYGEN SATURATION: 98 % | BODY MASS INDEX: 34.53 KG/M2 | WEIGHT: 220 LBS | HEART RATE: 97 BPM | TEMPERATURE: 100 F | DIASTOLIC BLOOD PRESSURE: 79 MMHG | HEIGHT: 67 IN

## 2018-05-12 DIAGNOSIS — R07.81 RIB PAIN ON LEFT SIDE: Primary | ICD-10-CM

## 2018-05-12 DIAGNOSIS — R07.81 RIB PAIN ON LEFT SIDE: ICD-10-CM

## 2018-05-12 PROCEDURE — 71046 X-RAY EXAM CHEST 2 VIEWS: CPT

## 2018-05-12 PROCEDURE — 99213 OFFICE O/P EST LOW 20 MIN: CPT | Performed by: FAMILY MEDICINE

## 2018-05-12 PROCEDURE — 4004F PT TOBACCO SCREEN RCVD TLK: CPT | Performed by: FAMILY MEDICINE

## 2018-05-12 PROCEDURE — 71111 X-RAY EXAM RIBS/CHEST4/> VWS: CPT

## 2018-05-12 PROCEDURE — G8427 DOCREV CUR MEDS BY ELIG CLIN: HCPCS | Performed by: FAMILY MEDICINE

## 2018-05-12 PROCEDURE — G8417 CALC BMI ABV UP PARAM F/U: HCPCS | Performed by: FAMILY MEDICINE

## 2018-05-12 RX ORDER — OMEPRAZOLE 40 MG/1
40 CAPSULE, DELAYED RELEASE ORAL
COMMUNITY
Start: 2017-06-19 | End: 2018-11-02

## 2018-05-12 RX ORDER — LAMOTRIGINE 100 MG/1
TABLET ORAL DAILY
Status: ON HOLD | COMMUNITY
End: 2019-06-10 | Stop reason: HOSPADM

## 2018-05-12 RX ORDER — DEXAMETHASONE SODIUM PHOSPHATE 10 MG/ML
10 INJECTION INTRAMUSCULAR; INTRAVENOUS ONCE
Status: COMPLETED | OUTPATIENT
Start: 2018-05-12 | End: 2018-05-12

## 2018-05-12 RX ORDER — HYDROCODONE BITARTRATE AND ACETAMINOPHEN 5; 325 MG/1; MG/1
1 TABLET ORAL EVERY 6 HOURS PRN
Qty: 10 TABLET | Refills: 0 | Status: SHIPPED | OUTPATIENT
Start: 2018-05-12 | End: 2018-05-15

## 2018-05-12 RX ORDER — DEXTROAMPHETAMINE SACCHARATE, AMPHETAMINE ASPARTATE, DEXTROAMPHETAMINE SULFATE AND AMPHETAMINE SULFATE 2.5; 2.5; 2.5; 2.5 MG/1; MG/1; MG/1; MG/1
10 TABLET ORAL
COMMUNITY
End: 2018-11-02

## 2018-05-12 RX ADMIN — DEXAMETHASONE SODIUM PHOSPHATE 10 MG: 10 INJECTION INTRAMUSCULAR; INTRAVENOUS at 17:03

## 2018-05-12 ASSESSMENT — ENCOUNTER SYMPTOMS
ABDOMINAL PAIN: 0
CHEST TIGHTNESS: 1
ANAL BLEEDING: 0
COUGH: 1
SHORTNESS OF BREATH: 1
CONSTIPATION: 0
DIARRHEA: 0
NAUSEA: 0

## 2018-05-15 ENCOUNTER — TELEPHONE (OUTPATIENT)
Dept: URGENT CARE | Age: 28
End: 2018-05-15

## 2018-09-03 ENCOUNTER — NURSE TRIAGE (OUTPATIENT)
Dept: CALL CENTER | Facility: HOSPITAL | Age: 28
End: 2018-09-03

## 2018-09-03 VITALS — WEIGHT: 220 LBS | BODY MASS INDEX: 34.46 KG/M2

## 2018-09-03 NOTE — TELEPHONE ENCOUNTER
"Caller  had her upper teeth extracted last Wednesday.  has had upper jaw pain since Sunday.  also has foul taste in mouth with foul odor.  does not think that she has fever. Instructed per Care Advice- will call dentist tomorrow morning for evaluation.  will call with any further problems/questions.    Reason for Disposition  • [1] Foul taste or odor in mouth AND [2] begins or increases > 2 days (48 hours) after tooth was removed    Additional Information  • Negative: Sounds like a life-threatening emergency to the triager  • Negative: Tooth knocked out  • Negative: [1] Bleeding present > 30 minutes AND [2] using correct technique of direct pressure  • Negative: [1] Bleeding now AND [2] second call after being instructed in correct technique of direct pressure  • Negative: [1] Has packing sutured over socket (extraction site) AND [2] now bleeding around the packing (Exception: few drops or ooze)  • Negative: Tongue is very swollen and tender  • Negative: [1] Face is swollen AND [2] fever  • Negative: Patient sounds very sick or weak to the triager  • Negative: [1] SEVERE pain (e.g., excruciating, unable to do any normal activities) AND [2] not improved 2 hours after pain medicine  • Negative: Face is very swollen  • Negative: Fever  • Negative: [1] Caller has URGENT question AND [2] triager unable to answer question  • Negative: [1] SEVERE pain (e.g., excruciating, pain scale 8-10) AND [2] begins or increases > 2 days (48 hours) after tooth was removed    Answer Assessment - Initial Assessment Questions  1. SYMPTOM: \"What's the main symptom you're concerned about?\" (e.g., bleeding, pain, fever)      Upper jaw pain after upper teeth pulled last Wednesday.  2. ONSET: \"When did the ________  start?\"      On Friday.  3. DATE of TOOTH EXTRACTION: \"When was surgery performed?\"       Last Wednesday.  4. BLEEDING: \"Is there any bleeding?\" If so, ask: \"How much?\"       No  Bleeding.  5. " "PAIN: \"Is there any pain?\" If so, ask: \"How bad is it?\"  (Scale 1-10; or mild, moderate, severe)      Pain-5, worse when has denture in.  6. FEVER: \"Do you have a fever?\" If so, ask: \"What is your temperature, how was it measured, and when did it start?\"      Unsure.  7. OTHER SYMPTOMS: \"Do you have any other symptoms?\" (e.g., face swelling)      States is told that her face is swollen-caller states is unsure.    Protocols used: TOOTH EXTRACTION-ADULT-AH      "

## 2018-09-29 ENCOUNTER — APPOINTMENT (OUTPATIENT)
Dept: CT IMAGING | Facility: HOSPITAL | Age: 28
End: 2018-09-29

## 2018-09-29 ENCOUNTER — HOSPITAL ENCOUNTER (EMERGENCY)
Facility: HOSPITAL | Age: 28
Discharge: HOME | End: 2018-09-29
Attending: EMERGENCY MEDICINE | Admitting: EMERGENCY MEDICINE

## 2018-09-29 ENCOUNTER — APPOINTMENT (OUTPATIENT)
Dept: GENERAL RADIOLOGY | Facility: HOSPITAL | Age: 28
End: 2018-09-29

## 2018-09-29 VITALS
HEART RATE: 103 BPM | HEIGHT: 67 IN | BODY MASS INDEX: 34.53 KG/M2 | RESPIRATION RATE: 15 BRPM | OXYGEN SATURATION: 100 % | DIASTOLIC BLOOD PRESSURE: 76 MMHG | TEMPERATURE: 98.2 F | WEIGHT: 220 LBS | SYSTOLIC BLOOD PRESSURE: 123 MMHG

## 2018-09-29 DIAGNOSIS — R07.9 CHEST PAIN, UNSPECIFIED TYPE: Primary | ICD-10-CM

## 2018-09-29 LAB
ALBUMIN SERPL-MCNC: 4.5 G/DL (ref 3.5–5)
ALBUMIN/GLOB SERPL: 1.3 G/DL (ref 1.1–2.5)
ALP SERPL-CCNC: 72 U/L (ref 24–120)
ALT SERPL W P-5'-P-CCNC: 23 U/L (ref 0–54)
ANION GAP SERPL CALCULATED.3IONS-SCNC: 12 MMOL/L (ref 4–13)
AST SERPL-CCNC: 26 U/L (ref 7–45)
BASOPHILS # BLD AUTO: 0.04 10*3/MM3 (ref 0–0.2)
BASOPHILS NFR BLD AUTO: 0.5 % (ref 0–2)
BILIRUB SERPL-MCNC: 0.4 MG/DL (ref 0.1–1)
BUN BLD-MCNC: 8 MG/DL (ref 5–21)
BUN/CREAT SERPL: 10.5 (ref 7–25)
CALCIUM SPEC-SCNC: 9.5 MG/DL (ref 8.4–10.4)
CHLORIDE SERPL-SCNC: 108 MMOL/L (ref 98–110)
CO2 SERPL-SCNC: 24 MMOL/L (ref 24–31)
CREAT BLD-MCNC: 0.76 MG/DL (ref 0.5–1.4)
D DIMER PPP FEU-MCNC: 0.59 MG/L (FEU) (ref 0–0.5)
DEPRECATED RDW RBC AUTO: 42.1 FL (ref 40–54)
EOSINOPHIL # BLD AUTO: 0.09 10*3/MM3 (ref 0–0.7)
EOSINOPHIL NFR BLD AUTO: 1.1 % (ref 0–4)
ERYTHROCYTE [DISTWIDTH] IN BLOOD BY AUTOMATED COUNT: 14.6 % (ref 12–15)
GFR SERPL CREATININE-BSD FRML MDRD: 92 ML/MIN/1.73
GLOBULIN UR ELPH-MCNC: 3.5 GM/DL
GLUCOSE BLD-MCNC: 103 MG/DL (ref 70–100)
HCG SERPL QL: NEGATIVE
HCT VFR BLD AUTO: 35.2 % (ref 37–47)
HGB BLD-MCNC: 11.8 G/DL (ref 12–16)
HOLD SPECIMEN: NORMAL
HOLD SPECIMEN: NORMAL
IMM GRANULOCYTES # BLD: 0.03 10*3/MM3 (ref 0–0.03)
IMM GRANULOCYTES NFR BLD: 0.4 % (ref 0–5)
LYMPHOCYTES # BLD AUTO: 1.71 10*3/MM3 (ref 0.72–4.86)
LYMPHOCYTES NFR BLD AUTO: 21.5 % (ref 15–45)
MAGNESIUM SERPL-MCNC: 1.9 MG/DL (ref 1.4–2.2)
MCH RBC QN AUTO: 26.9 PG (ref 28–32)
MCHC RBC AUTO-ENTMCNC: 33.5 G/DL (ref 33–36)
MCV RBC AUTO: 80.2 FL (ref 82–98)
MONOCYTES # BLD AUTO: 0.38 10*3/MM3 (ref 0.19–1.3)
MONOCYTES NFR BLD AUTO: 4.8 % (ref 4–12)
NEUTROPHILS # BLD AUTO: 5.7 10*3/MM3 (ref 1.87–8.4)
NEUTROPHILS NFR BLD AUTO: 71.7 % (ref 39–78)
NRBC BLD MANUAL-RTO: 0 /100 WBC (ref 0–0)
PLATELET # BLD AUTO: 225 10*3/MM3 (ref 130–400)
PMV BLD AUTO: 11.4 FL (ref 6–12)
POTASSIUM BLD-SCNC: 3.6 MMOL/L (ref 3.5–5.3)
PROT SERPL-MCNC: 8 G/DL (ref 6.3–8.7)
RBC # BLD AUTO: 4.39 10*6/MM3 (ref 4.2–5.4)
SODIUM BLD-SCNC: 144 MMOL/L (ref 135–145)
TROPONIN I SERPL-MCNC: <0.012 NG/ML (ref 0–0.03)
TROPONIN I SERPL-MCNC: <0.012 NG/ML (ref 0–0.03)
WBC NRBC COR # BLD: 7.95 10*3/MM3 (ref 4.8–10.8)
WHOLE BLOOD HOLD SPECIMEN: NORMAL
WHOLE BLOOD HOLD SPECIMEN: NORMAL

## 2018-09-29 PROCEDURE — 84484 ASSAY OF TROPONIN QUANT: CPT | Performed by: EMERGENCY MEDICINE

## 2018-09-29 PROCEDURE — 85025 COMPLETE CBC W/AUTO DIFF WBC: CPT | Performed by: EMERGENCY MEDICINE

## 2018-09-29 PROCEDURE — 84703 CHORIONIC GONADOTROPIN ASSAY: CPT | Performed by: EMERGENCY MEDICINE

## 2018-09-29 PROCEDURE — 83735 ASSAY OF MAGNESIUM: CPT | Performed by: EMERGENCY MEDICINE

## 2018-09-29 PROCEDURE — 71275 CT ANGIOGRAPHY CHEST: CPT

## 2018-09-29 PROCEDURE — 85379 FIBRIN DEGRADATION QUANT: CPT | Performed by: EMERGENCY MEDICINE

## 2018-09-29 PROCEDURE — 99284 EMERGENCY DEPT VISIT MOD MDM: CPT

## 2018-09-29 PROCEDURE — 80053 COMPREHEN METABOLIC PANEL: CPT | Performed by: EMERGENCY MEDICINE

## 2018-09-29 PROCEDURE — 93005 ELECTROCARDIOGRAM TRACING: CPT | Performed by: EMERGENCY MEDICINE

## 2018-09-29 PROCEDURE — 0 IOPAMIDOL PER 1 ML: Performed by: EMERGENCY MEDICINE

## 2018-09-29 PROCEDURE — 71046 X-RAY EXAM CHEST 2 VIEWS: CPT

## 2018-09-29 PROCEDURE — 93010 ELECTROCARDIOGRAM REPORT: CPT | Performed by: INTERNAL MEDICINE

## 2018-09-29 RX ORDER — SODIUM CHLORIDE 0.9 % (FLUSH) 0.9 %
10 SYRINGE (ML) INJECTION AS NEEDED
Status: DISCONTINUED | OUTPATIENT
Start: 2018-09-29 | End: 2018-09-29 | Stop reason: HOSPADM

## 2018-09-29 RX ORDER — ASPIRIN 81 MG/1
324 TABLET, CHEWABLE ORAL ONCE
Status: COMPLETED | OUTPATIENT
Start: 2018-09-29 | End: 2018-09-29

## 2018-09-29 RX ADMIN — ASPIRIN 81 MG CHEWABLE TABLET 324 MG: 81 TABLET CHEWABLE at 12:11

## 2018-09-29 RX ADMIN — IOPAMIDOL 150 ML: 755 INJECTION, SOLUTION INTRAVENOUS at 13:18

## 2018-09-30 ENCOUNTER — APPOINTMENT (OUTPATIENT)
Dept: MRI IMAGING | Age: 28
End: 2018-09-30
Payer: MEDICAID

## 2018-09-30 ENCOUNTER — HOSPITAL ENCOUNTER (EMERGENCY)
Age: 28
Discharge: HOME | End: 2018-09-30
Attending: EMERGENCY MEDICINE
Payer: MEDICAID

## 2018-09-30 ENCOUNTER — APPOINTMENT (OUTPATIENT)
Dept: CT IMAGING | Age: 28
End: 2018-09-30
Payer: MEDICAID

## 2018-09-30 VITALS
HEIGHT: 67 IN | HEART RATE: 90 BPM | BODY MASS INDEX: 34.53 KG/M2 | WEIGHT: 220 LBS | DIASTOLIC BLOOD PRESSURE: 97 MMHG | TEMPERATURE: 98.7 F | OXYGEN SATURATION: 97 % | RESPIRATION RATE: 16 BRPM | SYSTOLIC BLOOD PRESSURE: 134 MMHG

## 2018-09-30 DIAGNOSIS — F44.9 CONVERSION DISORDER: Primary | ICD-10-CM

## 2018-09-30 LAB
AMPHETAMINE SCREEN, URINE: NEGATIVE
BARBITURATE SCREEN URINE: NEGATIVE
BENZODIAZEPINE SCREEN, URINE: NEGATIVE
CANNABINOID SCREEN URINE: POSITIVE
COCAINE METABOLITE SCREEN URINE: NEGATIVE
HCG(URINE) PREGNANCY TEST: NEGATIVE
Lab: ABNORMAL
OPIATE SCREEN URINE: NEGATIVE

## 2018-09-30 PROCEDURE — 99285 EMERGENCY DEPT VISIT HI MDM: CPT

## 2018-09-30 PROCEDURE — 81025 URINE PREGNANCY TEST: CPT

## 2018-09-30 PROCEDURE — 70551 MRI BRAIN STEM W/O DYE: CPT

## 2018-09-30 PROCEDURE — 80307 DRUG TEST PRSMV CHEM ANLYZR: CPT

## 2018-09-30 PROCEDURE — 99284 EMERGENCY DEPT VISIT MOD MDM: CPT | Performed by: EMERGENCY MEDICINE

## 2018-09-30 ASSESSMENT — ENCOUNTER SYMPTOMS: SHORTNESS OF BREATH: 0

## 2018-10-08 NOTE — ED NOTES
"ED Call Back Questions    1. How are you doing since leaving the Emergency Department?    Doing well, good er visit. Has pcp  2. Do you have any questions about your discharge instructions? No     3. Have you filled your new prescriptions yet? N/A  a. Do you have any questions about those medications? N/A    4. Were you able to make a follow-up appointment with the physician? Yes     5. Do you have a primary care physician? Yes   a. If No, would you like for me to set you up with one? No   i. If Yes, “I will have our ED  give you a call right back at this number to work with you on the best time for an appointment.”    6. We are always looking to get better at what we do. Do you have any suggestions for what we can do to be even better? No   a. If Yes, \"Thank you for sharing your concerns. I apologize. I will follow up with our manager and patient . Would you like someone to call you back?\" N/A    7. Is there anything else I can do for you? No     "

## 2018-11-02 ENCOUNTER — OFFICE VISIT (OUTPATIENT)
Dept: NEUROLOGY | Age: 28
End: 2018-11-02
Payer: MEDICAID

## 2018-11-02 VITALS
BODY MASS INDEX: 33.59 KG/M2 | WEIGHT: 214 LBS | DIASTOLIC BLOOD PRESSURE: 86 MMHG | SYSTOLIC BLOOD PRESSURE: 130 MMHG | HEIGHT: 67 IN | HEART RATE: 78 BPM

## 2018-11-02 DIAGNOSIS — R20.0 NUMBNESS: ICD-10-CM

## 2018-11-02 DIAGNOSIS — F41.9 ANXIETY AND DEPRESSION: ICD-10-CM

## 2018-11-02 DIAGNOSIS — R53.1 WEAKNESS: Primary | ICD-10-CM

## 2018-11-02 DIAGNOSIS — F32.A ANXIETY AND DEPRESSION: ICD-10-CM

## 2018-11-02 DIAGNOSIS — R26.9 GAIT ABNORMALITY: ICD-10-CM

## 2018-11-02 PROCEDURE — G8484 FLU IMMUNIZE NO ADMIN: HCPCS | Performed by: PSYCHIATRY & NEUROLOGY

## 2018-11-02 PROCEDURE — 99204 OFFICE O/P NEW MOD 45 MIN: CPT | Performed by: PSYCHIATRY & NEUROLOGY

## 2018-11-02 PROCEDURE — G8417 CALC BMI ABV UP PARAM F/U: HCPCS | Performed by: PSYCHIATRY & NEUROLOGY

## 2018-11-02 PROCEDURE — G8427 DOCREV CUR MEDS BY ELIG CLIN: HCPCS | Performed by: PSYCHIATRY & NEUROLOGY

## 2018-11-02 PROCEDURE — 4004F PT TOBACCO SCREEN RCVD TLK: CPT | Performed by: PSYCHIATRY & NEUROLOGY

## 2019-05-26 ENCOUNTER — HOSPITAL ENCOUNTER (EMERGENCY)
Age: 29
Discharge: HOME | End: 2019-05-26
Payer: MEDICAID

## 2019-05-26 VITALS
RESPIRATION RATE: 20 BRPM | HEIGHT: 67 IN | BODY MASS INDEX: 33.74 KG/M2 | OXYGEN SATURATION: 94 % | WEIGHT: 215 LBS | HEART RATE: 108 BPM | DIASTOLIC BLOOD PRESSURE: 89 MMHG | TEMPERATURE: 98.6 F | SYSTOLIC BLOOD PRESSURE: 142 MMHG

## 2019-05-26 DIAGNOSIS — N30.01 ACUTE CYSTITIS WITH HEMATURIA: Primary | ICD-10-CM

## 2019-05-26 DIAGNOSIS — Z20.2 STD EXPOSURE: ICD-10-CM

## 2019-05-26 LAB
ALBUMIN SERPL-MCNC: 4.5 G/DL (ref 3.5–5.2)
ALP BLD-CCNC: 65 U/L (ref 35–104)
ALT SERPL-CCNC: 13 U/L (ref 5–33)
ANION GAP SERPL CALCULATED.3IONS-SCNC: 13 MMOL/L (ref 7–19)
AST SERPL-CCNC: 14 U/L (ref 5–32)
BACTERIA: ABNORMAL /HPF
BASOPHILS ABSOLUTE: 0.1 K/UL (ref 0–0.2)
BASOPHILS RELATIVE PERCENT: 0.8 % (ref 0–1)
BILIRUB SERPL-MCNC: 0.3 MG/DL (ref 0.2–1.2)
BILIRUBIN URINE: NEGATIVE
BLOOD, URINE: NEGATIVE
BUN BLDV-MCNC: 11 MG/DL (ref 6–20)
CALCIUM SERPL-MCNC: 9.5 MG/DL (ref 8.6–10)
CHLORIDE BLD-SCNC: 104 MMOL/L (ref 98–111)
CLARITY: CLEAR
CO2: 23 MMOL/L (ref 22–29)
COLOR: YELLOW
CREAT SERPL-MCNC: 0.7 MG/DL (ref 0.5–0.9)
EOSINOPHILS ABSOLUTE: 0.3 K/UL (ref 0–0.6)
EOSINOPHILS RELATIVE PERCENT: 2.4 % (ref 0–5)
EPITHELIAL CELLS, UA: 4 /HPF (ref 0–5)
GFR NON-AFRICAN AMERICAN: >60
GLUCOSE BLD-MCNC: 88 MG/DL (ref 74–109)
GLUCOSE URINE: NEGATIVE MG/DL
HCG(URINE) PREGNANCY TEST: NEGATIVE
HCT VFR BLD CALC: 35.9 % (ref 37–47)
HEMOGLOBIN: 11.3 G/DL (ref 12–16)
HYALINE CASTS: 0 /HPF (ref 0–8)
KETONES, URINE: NEGATIVE MG/DL
LEUKOCYTE ESTERASE, URINE: ABNORMAL
LIPASE: 26 U/L (ref 13–60)
LYMPHOCYTES ABSOLUTE: 3.1 K/UL (ref 1.1–4.5)
LYMPHOCYTES RELATIVE PERCENT: 29.2 % (ref 20–40)
MCH RBC QN AUTO: 26.2 PG (ref 27–31)
MCHC RBC AUTO-ENTMCNC: 31.5 G/DL (ref 33–37)
MCV RBC AUTO: 83.3 FL (ref 81–99)
MONOCYTES ABSOLUTE: 0.7 K/UL (ref 0–0.9)
MONOCYTES RELATIVE PERCENT: 6.7 % (ref 0–10)
NEUTROPHILS ABSOLUTE: 6.5 K/UL (ref 1.5–7.5)
NEUTROPHILS RELATIVE PERCENT: 60.6 % (ref 50–65)
NITRITE, URINE: NEGATIVE
PDW BLD-RTO: 15.5 % (ref 11.5–14.5)
PH UA: 6.5 (ref 5–8)
PLATELET # BLD: 247 K/UL (ref 130–400)
PMV BLD AUTO: 11.6 FL (ref 9.4–12.3)
POTASSIUM SERPL-SCNC: 3.7 MMOL/L (ref 3.5–5)
PROTEIN UA: NEGATIVE MG/DL
RBC # BLD: 4.31 M/UL (ref 4.2–5.4)
RBC UA: 2 /HPF (ref 0–4)
SODIUM BLD-SCNC: 140 MMOL/L (ref 136–145)
SPECIFIC GRAVITY UA: 1.01 (ref 1–1.03)
TOTAL PROTEIN: 8 G/DL (ref 6.6–8.7)
URINE REFLEX TO CULTURE: YES
UROBILINOGEN, URINE: 0.2 E.U./DL
WBC # BLD: 10.6 K/UL (ref 4.8–10.8)
WBC UA: 12 /HPF (ref 0–5)

## 2019-05-26 PROCEDURE — 87086 URINE CULTURE/COLONY COUNT: CPT

## 2019-05-26 PROCEDURE — 6370000000 HC RX 637 (ALT 250 FOR IP): Performed by: PHYSICIAN ASSISTANT

## 2019-05-26 PROCEDURE — 85025 COMPLETE CBC W/AUTO DIFF WBC: CPT

## 2019-05-26 PROCEDURE — 36415 COLL VENOUS BLD VENIPUNCTURE: CPT

## 2019-05-26 PROCEDURE — 96365 THER/PROPH/DIAG IV INF INIT: CPT

## 2019-05-26 PROCEDURE — 87591 N.GONORRHOEAE DNA AMP PROB: CPT

## 2019-05-26 PROCEDURE — 84703 CHORIONIC GONADOTROPIN ASSAY: CPT

## 2019-05-26 PROCEDURE — 2580000003 HC RX 258: Performed by: PHYSICIAN ASSISTANT

## 2019-05-26 PROCEDURE — 83690 ASSAY OF LIPASE: CPT

## 2019-05-26 PROCEDURE — 81001 URINALYSIS AUTO W/SCOPE: CPT

## 2019-05-26 PROCEDURE — 99283 EMERGENCY DEPT VISIT LOW MDM: CPT

## 2019-05-26 PROCEDURE — 99283 EMERGENCY DEPT VISIT LOW MDM: CPT | Performed by: PHYSICIAN ASSISTANT

## 2019-05-26 PROCEDURE — 87491 CHLMYD TRACH DNA AMP PROBE: CPT

## 2019-05-26 PROCEDURE — 80053 COMPREHEN METABOLIC PANEL: CPT

## 2019-05-26 PROCEDURE — 6360000002 HC RX W HCPCS: Performed by: PHYSICIAN ASSISTANT

## 2019-05-26 RX ORDER — 0.9 % SODIUM CHLORIDE 0.9 %
1000 INTRAVENOUS SOLUTION INTRAVENOUS ONCE
Status: COMPLETED | OUTPATIENT
Start: 2019-05-26 | End: 2019-05-26

## 2019-05-26 RX ORDER — ONDANSETRON 2 MG/ML
4 INJECTION INTRAMUSCULAR; INTRAVENOUS ONCE
Status: DISCONTINUED | OUTPATIENT
Start: 2019-05-26 | End: 2019-05-26

## 2019-05-26 RX ORDER — HYDROXYZINE HYDROCHLORIDE 10 MG/1
10 TABLET, FILM COATED ORAL 2 TIMES DAILY PRN
Status: ON HOLD | COMMUNITY
End: 2019-06-10 | Stop reason: HOSPADM

## 2019-05-26 RX ORDER — METRONIDAZOLE 500 MG/1
2000 TABLET ORAL ONCE
Status: COMPLETED | OUTPATIENT
Start: 2019-05-26 | End: 2019-05-26

## 2019-05-26 RX ORDER — AZITHROMYCIN 250 MG/1
2000 TABLET, FILM COATED ORAL ONCE
Status: COMPLETED | OUTPATIENT
Start: 2019-05-26 | End: 2019-05-26

## 2019-05-26 RX ADMIN — SODIUM CHLORIDE 1000 ML: 9 INJECTION, SOLUTION INTRAVENOUS at 18:00

## 2019-05-26 RX ADMIN — METRONIDAZOLE 2000 MG: 500 TABLET, FILM COATED ORAL at 18:44

## 2019-05-26 RX ADMIN — GENTAMICIN SULFATE 240 MG: 40 INJECTION, SOLUTION INTRAMUSCULAR; INTRAVENOUS at 19:21

## 2019-05-26 RX ADMIN — AZITHROMYCIN 2000 MG: 250 TABLET, FILM COATED ORAL at 18:45

## 2019-05-28 LAB — URINE CULTURE, ROUTINE: NORMAL

## 2019-05-29 ASSESSMENT — ENCOUNTER SYMPTOMS
STRIDOR: 0
COLOR CHANGE: 0
EYE DISCHARGE: 0
APNEA: 0
SORE THROAT: 0
EYE PAIN: 0
COUGH: 0
NAUSEA: 0
ABDOMINAL PAIN: 1
ABDOMINAL DISTENTION: 0
BACK PAIN: 0
WHEEZING: 0
PHOTOPHOBIA: 0
SHORTNESS OF BREATH: 0
RHINORRHEA: 0

## 2019-05-29 NOTE — ED PROVIDER NOTES
[address] [facility] EMERGENCY DEPT  eMERGENCYdEPARTMENT eNCOUnter      Pt Name: All Wallace  MRN: [id number]  [place] 1990  Date of evaluation: 5/26/2019  Provider:MOON Montanez    CHIEF COMPLAINT       Chief Complaint   Patient presents with    Abdominal Cramping    Rash         HISTORY OF PRESENT ILLNESS  (Location/Symptom, Timing/Onset, Context/Setting, Quality, Duration, Modifying Factors, Severity.)   All Wallace is a 34 y.o. female who presents to the emergency department complaints exposure to STD potentially. She is unsure what she has. She would like to be treated with broad-spectrum admits to dysuria burning denies discharge or lesions. Unprotected sex with one partner. Has some mild abdominal pain suprapubically. HPI    Nursing Notes were reviewed and I agree. REVIEW OF SYSTEMS    (2-9 systems for level 4, 10 or more for level 5)     Review of Systems   Constitutional: Negative for activity change, appetite change, chills and fever. HENT: Negative for congestion, postnasal drip, rhinorrhea and sore throat. Eyes: Negative for photophobia, pain, discharge and visual disturbance. Respiratory: Negative for apnea, cough, shortness of breath, wheezing and stridor. Cardiovascular: Negative for chest pain, palpitations and leg swelling. Gastrointestinal: Positive for abdominal pain. Negative for abdominal distention and nausea. Genitourinary: Positive for dysuria and pelvic pain. Negative for vaginal bleeding. Musculoskeletal: Negative for arthralgias, back pain, joint swelling, neck pain and neck stiffness. Skin: Negative for color change and rash. Neurological: Negative for dizziness, syncope, facial asymmetry and headaches. Hematological: Negative for adenopathy. Does not bruise/bleed easily. Psychiatric/Behavioral: Negative for agitation, behavioral problems and confusion. Except as noted above the remainder of the review of systems was reviewed and negative.        PAST MEDICAL HISTORY     Past Medical History:   Diagnosis Date    Anxiety          SURGICAL HISTORY       Past Surgical History:   Procedure Laterality Date    APPENDECTOMY       SECTION      TUBAL LIGATION           CURRENT MEDICATIONS       Discharge Medication List as of 2019  6:41 PM      CONTINUE these medications which have NOT CHANGED    Details   hydrOXYzine (ATARAX) 10 MG tablet Take 10 mg by mouth 2 times daily as needed for ItchingHistorical Med      lamoTRIgine (LAMICTAL) 100 MG tablet Take by mouth daily Historical Med      FLUoxetine (PROZAC) 20 MG capsule Historical Med             ALLERGIES     Penicillins    FAMILY HISTORY     History reviewed. No pertinent family history.        SOCIAL HISTORY       Social History     Socioeconomic History    Marital status: Single     Spouse name: None    Number of children: None    Years of education: None    Highest education level: None   Occupational History    None   Social Needs    Financial resource strain: None    Food insecurity:     Worry: None     Inability: None    Transportation needs:     Medical: None     Non-medical: None   Tobacco Use    Smoking status: Current Every Day Smoker     Packs/day: 0.25    Smokeless tobacco: Never Used   Substance and Sexual Activity    Alcohol use: No     Alcohol/week: 0.0 oz    Drug use: No    Sexual activity: None   Lifestyle    Physical activity:     Days per week: None     Minutes per session: None    Stress: None   Relationships    Social connections:     Talks on phone: None     Gets together: None     Attends Christian service: None     Active member of club or organization: None     Attends meetings of clubs or organizations: None     Relationship status: None    Intimate partner violence:     Fear of current or ex partner: None     Emotionally abused: None     Physically abused: None     Forced sexual activity: None   Other Topics Concern    None   Social History Narrative    None       SCREENINGS           PHYSICAL EXAM    (up to 7 forlevel 4, 8 or more for level 5)     ED Triage Vitals [05/26/19 1647]   BP Temp Temp src Pulse Resp SpO2 Height Weight   (!) 142/89 98.6 °F (37 °C) -- 108 20 94 % 5' 7\" (1.702 m) 215 lb (97.5 kg)       Physical Exam   Constitutional: She is oriented to person, place, and time. She appears well-developed and well-nourished. No distress. HENT:   Head: Normocephalic and atraumatic. Right Ear: External ear normal.   Left Ear: External ear normal.   Nose: Nose normal.   Mouth/Throat: Oropharynx is clear and moist. No oropharyngeal exudate. Eyes: Pupils are equal, round, and reactive to light. Conjunctivae and EOM are normal. Right eye exhibits no discharge. Left eye exhibits no discharge. Neck: Normal range of motion. Neck supple. No thyromegaly present. Cardiovascular: Normal rate, regular rhythm, normal heart sounds and intact distal pulses. Exam reveals no friction rub. No murmur heard. Pulmonary/Chest: Effort normal and breath sounds normal. No stridor. No respiratory distress. She has no wheezes. Abdominal: Soft. Bowel sounds are normal. She exhibits no distension. There is no tenderness. Genitourinary: Vagina normal. No vaginal discharge found. Musculoskeletal: Normal range of motion. She exhibits no edema. Neurological: She is alert and oriented to person, place, and time. No cranial nerve deficit or sensory deficit. Coordination normal.   Skin: Skin is warm and dry. Capillary refill takes less than 2 seconds. No rash noted. She is not diaphoretic. Psychiatric: She has a normal mood and affect. Her behavior is normal. Judgment and thought content normal.   Nursing note and vitals reviewed.         DIAGNOSTIC RESULTS     RADIOLOGY:   Non-plain film images such as CT, Ultrasound and MRI are read by the radiologist. Plain radiographic images are visualized and preliminarilyinterpreted by No att. providers found with the below REFERRED TO:  DO Rosemary AcevedoBates County [facility] Hospitalhayley  [phone]      As needed    [facility] EMERGENCY DEPT  Janusz Kennedy  [phone]    If symptoms worsen      DISCHARGE MEDICATIONS:  Discharge Medication List as of 5/26/2019  6:41 PM          (Please note that portions of this note were completed with a voice recognition program.  Efforts were made to edit the dictations but occasionallywords are mis-transcribed.)    Ananya Basilio [address] [place] [address]  05/29/19 0158

## 2019-05-30 LAB
APTIMA MEDIA TYPE: ABNORMAL
CHLAMYDIA TRACHOMATIS AMPLIFIED DET: NEGATIVE
N GONORRHOEAE AMPLIFIED DET: POSITIVE
SPECIMEN SOURCE: ABNORMAL

## 2019-06-06 ENCOUNTER — HOSPITAL ENCOUNTER (INPATIENT)
Age: 29
LOS: 4 days | Discharge: HOME | DRG: 885 | End: 2019-06-10
Attending: EMERGENCY MEDICINE | Admitting: FAMILY MEDICINE
Payer: MEDICAID

## 2019-06-06 DIAGNOSIS — F32.A DEPRESSION, UNSPECIFIED DEPRESSION TYPE: Primary | ICD-10-CM

## 2019-06-06 PROBLEM — R45.851 SUICIDAL IDEATION: Status: ACTIVE | Noted: 2019-06-06

## 2019-06-06 PROBLEM — F33.0 MILD EPISODE OF RECURRENT MAJOR DEPRESSIVE DISORDER (HCC): Status: ACTIVE | Noted: 2019-06-06

## 2019-06-06 LAB
ACETAMINOPHEN LEVEL: <15 UG/ML
ALBUMIN SERPL-MCNC: 5 G/DL (ref 3.5–5.2)
ALP BLD-CCNC: 74 U/L (ref 35–104)
ALT SERPL-CCNC: 16 U/L (ref 5–33)
AMPHETAMINE SCREEN, URINE: NEGATIVE
ANION GAP SERPL CALCULATED.3IONS-SCNC: 15 MMOL/L (ref 7–19)
AST SERPL-CCNC: 16 U/L (ref 5–32)
BARBITURATE SCREEN URINE: NEGATIVE
BASOPHILS ABSOLUTE: 0.1 K/UL (ref 0–0.2)
BASOPHILS RELATIVE PERCENT: 0.7 % (ref 0–1)
BENZODIAZEPINE SCREEN, URINE: NEGATIVE
BILIRUB SERPL-MCNC: 0.6 MG/DL (ref 0.2–1.2)
BILIRUBIN URINE: ABNORMAL
BLOOD, URINE: NEGATIVE
BUN BLDV-MCNC: 9 MG/DL (ref 6–20)
CALCIUM SERPL-MCNC: 9.6 MG/DL (ref 8.6–10)
CANNABINOID SCREEN URINE: POSITIVE
CHLORIDE BLD-SCNC: 102 MMOL/L (ref 98–111)
CLARITY: ABNORMAL
CO2: 21 MMOL/L (ref 22–29)
COCAINE METABOLITE SCREEN URINE: NEGATIVE
COLOR: ABNORMAL
CREAT SERPL-MCNC: 0.8 MG/DL (ref 0.5–0.9)
EOSINOPHILS ABSOLUTE: 0.4 K/UL (ref 0–0.6)
EOSINOPHILS RELATIVE PERCENT: 3.7 % (ref 0–5)
ETHANOL: <10 MG/DL (ref 0–0.08)
GFR NON-AFRICAN AMERICAN: >60
GLUCOSE BLD-MCNC: 101 MG/DL (ref 74–109)
GLUCOSE URINE: NEGATIVE MG/DL
HCG QUALITATIVE: NEGATIVE
HCT VFR BLD CALC: 39.6 % (ref 37–47)
HEMOGLOBIN: 12.8 G/DL (ref 12–16)
KETONES, URINE: NEGATIVE MG/DL
LEUKOCYTE ESTERASE, URINE: NEGATIVE
LYMPHOCYTES ABSOLUTE: 3.6 K/UL (ref 1.1–4.5)
LYMPHOCYTES RELATIVE PERCENT: 37.7 % (ref 20–40)
Lab: ABNORMAL
MCH RBC QN AUTO: 26.7 PG (ref 27–31)
MCHC RBC AUTO-ENTMCNC: 32.3 G/DL (ref 33–37)
MCV RBC AUTO: 82.7 FL (ref 81–99)
MONOCYTES ABSOLUTE: 0.7 K/UL (ref 0–0.9)
MONOCYTES RELATIVE PERCENT: 7.7 % (ref 0–10)
NEUTROPHILS ABSOLUTE: 4.8 K/UL (ref 1.5–7.5)
NEUTROPHILS RELATIVE PERCENT: 50.1 % (ref 50–65)
NITRITE, URINE: NEGATIVE
OPIATE SCREEN URINE: NEGATIVE
PDW BLD-RTO: 15.5 % (ref 11.5–14.5)
PH UA: 6 (ref 5–8)
PLATELET # BLD: 205 K/UL (ref 130–400)
PMV BLD AUTO: 11.5 FL (ref 9.4–12.3)
POTASSIUM SERPL-SCNC: 3.2 MMOL/L (ref 3.5–5)
PROTEIN UA: ABNORMAL MG/DL
RBC # BLD: 4.79 M/UL (ref 4.2–5.4)
SALICYLATE, SERUM: <3 MG/DL (ref 3–10)
SODIUM BLD-SCNC: 138 MMOL/L (ref 136–145)
SPECIFIC GRAVITY UA: 1.03 (ref 1–1.03)
TOTAL PROTEIN: 8.7 G/DL (ref 6.6–8.7)
URINE REFLEX TO CULTURE: ABNORMAL
UROBILINOGEN, URINE: 1 E.U./DL
WBC # BLD: 9.7 K/UL (ref 4.8–10.8)

## 2019-06-06 PROCEDURE — 1240000000 HC EMOTIONAL WELLNESS R&B

## 2019-06-06 PROCEDURE — 99285 EMERGENCY DEPT VISIT HI MDM: CPT

## 2019-06-06 PROCEDURE — 80307 DRUG TEST PRSMV CHEM ANLYZR: CPT

## 2019-06-06 PROCEDURE — G0480 DRUG TEST DEF 1-7 CLASSES: HCPCS

## 2019-06-06 PROCEDURE — 84703 CHORIONIC GONADOTROPIN ASSAY: CPT

## 2019-06-06 PROCEDURE — 90792 PSYCH DIAG EVAL W/MED SRVCS: CPT | Performed by: NURSE PRACTITIONER

## 2019-06-06 PROCEDURE — 81003 URINALYSIS AUTO W/O SCOPE: CPT

## 2019-06-06 PROCEDURE — 80053 COMPREHEN METABOLIC PANEL: CPT

## 2019-06-06 PROCEDURE — 85025 COMPLETE CBC W/AUTO DIFF WBC: CPT

## 2019-06-06 PROCEDURE — 99285 EMERGENCY DEPT VISIT HI MDM: CPT | Performed by: EMERGENCY MEDICINE

## 2019-06-06 PROCEDURE — 36415 COLL VENOUS BLD VENIPUNCTURE: CPT

## 2019-06-06 RX ORDER — FLUOXETINE HYDROCHLORIDE 20 MG/1
20 CAPSULE ORAL DAILY
Status: DISCONTINUED | OUTPATIENT
Start: 2019-06-06 | End: 2019-06-06

## 2019-06-06 RX ORDER — ACETAMINOPHEN 325 MG/1
650 TABLET ORAL EVERY 4 HOURS PRN
Status: DISCONTINUED | OUTPATIENT
Start: 2019-06-06 | End: 2019-06-10 | Stop reason: HOSPADM

## 2019-06-06 RX ORDER — LAMOTRIGINE 25 MG/1
50 TABLET ORAL DAILY
Status: DISCONTINUED | OUTPATIENT
Start: 2019-06-07 | End: 2019-06-10 | Stop reason: HOSPADM

## 2019-06-06 RX ORDER — HYDROXYZINE HYDROCHLORIDE 25 MG/1
25 TABLET, FILM COATED ORAL 3 TIMES DAILY PRN
Status: DISCONTINUED | OUTPATIENT
Start: 2019-06-06 | End: 2019-06-10 | Stop reason: HOSPADM

## 2019-06-06 RX ORDER — FLUOXETINE HYDROCHLORIDE 20 MG/1
20 CAPSULE ORAL DAILY
Status: DISCONTINUED | OUTPATIENT
Start: 2019-06-07 | End: 2019-06-10 | Stop reason: HOSPADM

## 2019-06-06 ASSESSMENT — SLEEP AND FATIGUE QUESTIONNAIRES
DIFFICULTY FALLING ASLEEP: YES
DIFFICULTY STAYING ASLEEP: YES
AVERAGE NUMBER OF SLEEP HOURS: 2
DO YOU HAVE DIFFICULTY SLEEPING: YES
RESTFUL SLEEP: NO
DO YOU USE A SLEEP AID: NO
DIFFICULTY ARISING: NO

## 2019-06-06 ASSESSMENT — ENCOUNTER SYMPTOMS
BACK PAIN: 0
SORE THROAT: 0
VOMITING: 0
COUGH: 0
CHEST TIGHTNESS: 0
ABDOMINAL PAIN: 0
SHORTNESS OF BREATH: 0
EYE PAIN: 0
CONSTIPATION: 0
COLOR CHANGE: 0
RHINORRHEA: 0
WHEEZING: 0
TROUBLE SWALLOWING: 0
ABDOMINAL DISTENTION: 0
PHOTOPHOBIA: 0
NAUSEA: 0
DIARRHEA: 0

## 2019-06-06 ASSESSMENT — PATIENT HEALTH QUESTIONNAIRE - PHQ9: SUM OF ALL RESPONSES TO PHQ QUESTIONS 1-9: 18

## 2019-06-06 ASSESSMENT — LIFESTYLE VARIABLES: HISTORY_ALCOHOL_USE: NO

## 2019-06-06 NOTE — ED NOTES
Pt changed into maroon scrubs, belongings removed from room; ligature risks removed from room, cabinets locked. Security notified. Sitter initiated.        Kevin Chaves RN  06/06/19 8052
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
## 2019-06-06 NOTE — PLAN OF CARE
Problem: Health Behavior:  Goal: Ability to verbalize adaptive coping strategies to use when suicidal feelings occur will improve  Description  Ability to verbalize adaptive coping strategies to use when suicidal feelings occur will improve  Outcome: Ongoing  Note:                                                                       Group Therapy Note    Date: 6/6/2019  Start Time: 1000  End Time:  9513  Number of Participants: 13    Type of Group: Psychoeducation    Wellness Binder Information  Module Name:  Emotional wellness  Session Number:  3    Patient's Goal:  Happiness    Notes:  Pt did not attend group as scheduled. Pt was invited and encouraged to attend group. However, pt still refused/declined. Nursing staff notified.      Discipline Responsible: /Counselor      Signature:  Koby Garcia [facility] [place]
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
## 2019-06-06 NOTE — PROGRESS NOTES
Returned call to the [facility]. Spoke with Kimmie Simon in dispatch to verify that the patient is on our unit as she has been listed as a missing person. Also spoke with Ana Laura Velásquez to verify patient is not missing.
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
## 2019-06-06 NOTE — PROGRESS NOTES
BHI Admission From ED  Nursing Admission Note    Admission Type: Voluntary    Reason for Admission: \"I don't want to live anymore. I just don't have it in me to fight anymore. I just want to go to sleep and not wake up. \"     Patient Active Problem List   Diagnosis    Weakness    Numbness    Anxiety and depression    Gait abnormality    Depression, acute    Suicidal ideation       Pt admitted from 's care in ED to adult [facility] room # 624. Arrived on unit via [facility] with . Pt appropriately attired in paper scrubs. Body assessment completed by Viviann Angelucci and jose angel with no contraband discovered. All tubes, lines, and drains were appropriately discontinued by ED staff prior to pt transfer to [facility]. Pt belongings and valuables inventoried and cataloged, stored per policy. Pt oriented to surroundings, program expectations, and copy pt rights given. Received admit packet: [facility], Visitation Info, Fall Prevention, Restraints Info. Consents reviewed, signed Pt Rights, Handbook Acceptance, Visit/Call Acceptance, PHI Release, Social Info Release, and Treatment Agreement. Pt verbalizes understanding. Pt is a smoker? no Pt offered Nicotine patch no  Pt refused Nicotine patch? no     Identifies stressors. Financial, family.     Status and Exam  Normal: No  Facial Expression: Avoids Gaze, Sad  Affect: Congruent  Level of Consciousness: Alert  Mood:Normal: No  Mood: Depressed, Anxious, Sad  Motor Activity:Normal: No  Motor Activity: Decreased  Interview Behavior: Cooperative  Preception: [place] to Person, [place] to Time, [place] to Place, [place] to Situation  Attention:Normal: No  Thought Processes: Circumstantial  Thought Content:Normal: Yes  Hallucinations: None  Delusions: No  Memory:Normal: Yes  Insight and Judgment: No  Insight and Judgment: Poor Judgment, Poor Insight  Present Suicidal Ideation: Yes  Present Homicidal Ideation: No    C/o:    Notes:

## 2019-06-06 NOTE — PROGRESS NOTES
Group Therapy Note    Start Time: 900  End Time:  078  Number of Participants: 14    Type of Group: Community Meeting       Patient's Goal:  \"clearing my head and focusing\"      Notes:      Participation Level:  Active Listener       Participation Quality: Appropriate      Thought Process/Content: Logical      Affective Functioning: Congruent      Mood: calm      Level of consciousness:  Alert      Modes of Intervention: Support      Discipline Responsible: Behavioral Health Tech II      Signature:  Denver Ryan

## 2019-06-06 NOTE — PROGRESS NOTES
Requirement Note     SW met with pt to complete Psychosocial within 72 hours, CSSRS within 24 hours, and treatment plan signature sheet within 72 hours. In the last 6 months has the pt been a danger to self: YES  In the last 6 months has the pt been a danger to others: /NO    Provided pt with [employer] handout entitled \"Quitting Smoking. \"  Reviewed handout with pt addressing dangers of smoking, developing coping skills, and providing basic information about quitting. Patient received all components / Patient refused/declined practical counseling of tobacco practical counseling during the hospital stay.
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
## 2019-06-06 NOTE — PROGRESS NOTES
BHI Daily Shift Assessment  Nursing Progress Note    Room: [facility]/624A-01 Name: Juanita Conrad Age: 34 y.o.     Gender: female   Dx: Mild episode of recurrent major depressive disorder ([address]  Precautions: suicide risk  Target Symptoms:   Accu-Chek: NoSleep: no, patient reports arriving on the unit early in the am,Sleep Quality Poor SI Denies SI AV Denies [facility]  ADLs: Yes Speech: normal Depression: 10 Anxiety: 9   Participation LevelActive Listener  Visitation: No  Participation QualityAppropriate and Attentive    Complaints: Missing her children    Electronically signed by Cristino Park RN on 6/6/2019 at 6:41 PM

## 2019-06-06 NOTE — PROGRESS NOTES
Admission Note      Reason for admission/Target Symptom: Patient admitted to [facility] due to female who presents to the emergency department for feeling lost and alone. She has had to give her 5 kids up today because she has no where to live. Pt had been staying with a friend, but this then ended which is why she has ended up homeless      Diagnoses: Depression NOS  UDS: Cannabinoid   BAL:  Neg    SW met with treatment team to discuss patient's treatment including care planning, discharge planning, and follow-up needs. Pt has been admitted to [facility]. Treatment team has identified patient's discharge needs as medication management and outpatient therapy/counseling. Pt confirmed  the need for ongoing treatment post inpatient stay. Pt was also provided a handout of contact information for drug and alcohol treatment centers and other community support service such as PING, AA, and [facility] .

## 2019-06-07 LAB
ANION GAP SERPL CALCULATED.3IONS-SCNC: 12 MMOL/L (ref 7–19)
BUN BLDV-MCNC: 15 MG/DL (ref 6–20)
CALCIUM SERPL-MCNC: 9.1 MG/DL (ref 8.6–10)
CHLORIDE BLD-SCNC: 105 MMOL/L (ref 98–111)
CO2: 24 MMOL/L (ref 22–29)
CREAT SERPL-MCNC: 0.9 MG/DL (ref 0.5–0.9)
GFR NON-AFRICAN AMERICAN: >60
GLUCOSE BLD-MCNC: 90 MG/DL (ref 74–109)
POTASSIUM SERPL-SCNC: 3.9 MMOL/L (ref 3.5–5)
SODIUM BLD-SCNC: 141 MMOL/L (ref 136–145)
TSH REFLEX FT4: 0.31 UIU/ML (ref 0.35–5.5)
VITAMIN B-12: 791 PG/ML (ref 211–946)
VITAMIN D 25-HYDROXY: 23.8 NG/ML

## 2019-06-07 PROCEDURE — 84443 ASSAY THYROID STIM HORMONE: CPT

## 2019-06-07 PROCEDURE — 6370000000 HC RX 637 (ALT 250 FOR IP): Performed by: NURSE PRACTITIONER

## 2019-06-07 PROCEDURE — 99231 SBSQ HOSP IP/OBS SF/LOW 25: CPT | Performed by: NURSE PRACTITIONER

## 2019-06-07 PROCEDURE — 1240000000 HC EMOTIONAL WELLNESS R&B

## 2019-06-07 PROCEDURE — 6370000000 HC RX 637 (ALT 250 FOR IP): Performed by: FAMILY MEDICINE

## 2019-06-07 PROCEDURE — 80048 BASIC METABOLIC PNL TOTAL CA: CPT

## 2019-06-07 PROCEDURE — 36415 COLL VENOUS BLD VENIPUNCTURE: CPT

## 2019-06-07 PROCEDURE — 6370000000 HC RX 637 (ALT 250 FOR IP): Performed by: PSYCHIATRY & NEUROLOGY

## 2019-06-07 PROCEDURE — 82306 VITAMIN D 25 HYDROXY: CPT

## 2019-06-07 PROCEDURE — 82607 VITAMIN B-12: CPT

## 2019-06-07 RX ORDER — TRAZODONE HYDROCHLORIDE 50 MG/1
50 TABLET ORAL
Status: COMPLETED | OUTPATIENT
Start: 2019-06-07 | End: 2019-06-07

## 2019-06-07 RX ORDER — ERGOCALCIFEROL (VITAMIN D2) 1250 MCG
50000 CAPSULE ORAL WEEKLY
Qty: 11 CAPSULE | Refills: 0 | Status: SHIPPED | OUTPATIENT
Start: 2019-06-07 | End: 2019-06-10

## 2019-06-07 RX ORDER — ERGOCALCIFEROL 1.25 MG/1
50000 CAPSULE ORAL WEEKLY
Status: DISCONTINUED | OUTPATIENT
Start: 2019-06-07 | End: 2019-06-10 | Stop reason: HOSPADM

## 2019-06-07 RX ADMIN — HYDROXYZINE HYDROCHLORIDE 25 MG: 25 TABLET, FILM COATED ORAL at 12:10

## 2019-06-07 RX ADMIN — HYDROXYZINE HYDROCHLORIDE 25 MG: 25 TABLET, FILM COATED ORAL at 21:09

## 2019-06-07 RX ADMIN — ERGOCALCIFEROL 50000 UNITS: 1.25 CAPSULE ORAL at 12:09

## 2019-06-07 RX ADMIN — FLUOXETINE HYDROCHLORIDE 20 MG: 20 CAPSULE ORAL at 08:17

## 2019-06-07 RX ADMIN — LAMOTRIGINE 50 MG: 25 TABLET ORAL at 08:17

## 2019-06-07 RX ADMIN — TRAZODONE HYDROCHLORIDE 50 MG: 50 TABLET ORAL at 22:16

## 2019-06-07 NOTE — H&P
HISTORY and PHYSICAL      CHIEF COMPLAINT:  Depression, SI    Reason for Admission:  Depression, SI    History Obtained From:  patient    HISTORY OF PRESENT ILLNESS:      The patient is a 34 y.o. female who is admitted to the [place] [address] unit with worsening mood issues. She has no c/o CP or SOA. No abdominal pain or N/V. No dysuria. No weakness or HA. No new pain complaints. No fevers. Past Medical History:        Diagnosis Date    Anxiety     Depression, acute 2019     Past Surgical History:        Procedure Laterality Date    APPENDECTOMY       SECTION      TUBAL LIGATION           Medications Prior to Admission:    Medications Prior to Admission: hydrOXYzine (ATARAX) 10 MG tablet, Take 10 mg by mouth 2 times daily as needed for Itching  lamoTRIgine (LAMICTAL) 100 MG tablet, Take by mouth daily   FLUoxetine (PROZAC) 20 MG capsule,     Allergies:  Penicillins    Social History:   TOBACCO:   reports that she has been smoking. She has been smoking about 0.25 packs per day. She has never used smokeless tobacco.  ETOH:   reports that she does not drink alcohol. DRUGS:   reports that she does not use drugs. OCCUPATION:  She is working  Patient currently is homeless      Family History:   History reviewed. No pertinent family history.   REVIEW OF SYSTEMS:  Constitutional: neg  CV: neg  Pulmonary: neg  GI: neg  : neg  Psych: depression, SI  Neuro: neg  Skin: neg  MusculoSkeletal: neg  HEENT: neg  Joints: neg    Vitals:  /64   Pulse 53   Temp 98.8 °F (37.1 °C) (Temporal)   Resp 18   Ht 5' 7\" (1.702 m)   Wt 217 lb 12.8 oz (98.8 kg)   LMP 2019   SpO2 99%   BMI 34.11 kg/m²     PHYSICAL EXAM:  Gen: NAD, alert  HEENT: WNL  Lymph: no LAD  Neck: no JVD or masses  Chest: CTA bilat  CV: RRR  Abdomen: NT/ND  Extrem: no C/C/E  Neuro: non focal  Skin: no rashes  Joints: no redness    DATA:  I have reviewed the admission labs and imaging tests.    ASSESSMENT AND PLAN:      Patient Active Hospital Problem List:   Mild episode of recurrent major depressive disorder, SI--follow with Psych   THC Use   Hypokalemia---recheck labs in am   Elevated BP---monitor with BP          Dorene Domingo MD  10:54 PM 6/6/2019

## 2019-06-07 NOTE — BH NOTE
Group Therapy Note    Date: 6/7/2019  Start Time: 1330  End Time:  1400  Number of Participants: 8    Type of Group: Spirituality     Wellness Binder Information  Module Name:  Muslim Service  Session Number:      Patient's Goal:  Nurture a sense of the Sacred    Notes:      Status After Intervention:  Improved    Participation Level:  Active Listener    Participation Quality: Appropriate and Attentive      Speech:  normal      Thought Process/Content:       Affective Functioning: Congruent      Mood: euthymic, calm      Level of consciousness:  Oriented x4 and Attentive      Response to Learning: Capable of insight      Endings:     Modes of Intervention: Education and Activity      Discipline Responsible:       Signature:  Zaire Miller  [place]

## 2019-06-07 NOTE — PROGRESS NOTES
[address] [place] [address]      Psychiatric Progress Note    Name:  Gwen Duncan  Date:  6/7/2019  Age:  34 y.o. Sex:  female  Ethnicity:   Primary Care Physician:  Eliseo Tellez DO   Patient Care Team:  Patient Care Team:  Eliseo Tellez DO as PCP - General (Internal Medicine)  Chief Complaint: \" I feel lost and alone and I am tired of being alive. \"            Historian:patient  Complaint Type: anxiety, decreased appetite, depression, fatigue, loss of interest in favorite activities, mood swings and sleep disturbance  Course of Symptoms: ongoing  Precipitating Factors: history of mental illness, cessation of psychotropic medications, relationships stressors      Subjective  Patient reports sleep as \"ok. \" She denies SI, HI and psychosis at this time. She is tearful today and is worried that her children's father and his girlfriend will try to take her children away. She reports that she did not tell them where she was going. She reports that her sister visited her last night and that went well. She reports that she can live with her sister when she is discharged from this unit. We will obtain collateral from her sister. Patient is minimizing her suicidal thoughts today. Just yesterday she told this provider that she was \"tired of being alive\" and \"I don't have any fight left in me anymore. \" Today she reports that she is \"fine. \" Patient has been calm and cooperative with staff and peers. Patient has been compliant with medications. Patient has been attending groups. Patient reports appetite as \"it's ok. \" Patient reports no side effects from medications.           Previous Psychiatric/Substance Use History      Medical History:  Past Medical History:   Diagnosis Date    Anxiety     Depression, acute 6/6/2019        KITCHEN History:   Social History     Substance and Sexual Activity   Alcohol Use No    Alcohol/week: 0.0 oz         Social History     Substance and Sexual Activity   Drug Use No Social History     Tobacco Use   Smoking Status Current Every Day Smoker    Packs/day: 0.25   Smokeless Tobacco Never Used        Family History:     History reviewed. No pertinent family history. Vital Signs:  Last set of tests and vitals:  Vitals:    06/06/19 1954   BP: 130/64   Pulse: 53   Resp: 18   Temp: 98.8 °F (37.1 °C)   SpO2: 99%          Mental Status:  Level of consciousness:  within normal limits and awake  Appearance:  well-appearing, street clothes, in chair, good grooming and good hygiene  Behavior/Motor:  no abnormalities noted  Attitude toward examiner:  cooperative, attentive and good eye contact  Speech:  normal rate and normal volume  Mood:  \"I am missing my kids. \"  Affect:  anxious and tearful  Thought processes:  linear and goal directed  Thought content:  Homocidal ideation :denies  Suicidal Ideation:  denies suicidal ideation  Delusions:  no evidence of delusions  Perceptual Disturbance:  denies any perceptual disturbance  Cognition:  oriented to person, place, and time  Concentration : fair  Memory intact for recent and remote  Fund of knowledge:  average  Abstract thinking:  adequate  Insight:  fair  Judgment:  fair     lamoTRIgine  50 mg Oral Daily    FLUoxetine  20 mg Oral Daily       Current Medications:  Current Facility-Administered Medications   Medication Dose Route Frequency Provider Last Rate Last Dose    acetaminophen (TYLENOL) tablet 650 mg  650 mg Oral Q4H PRN Selene Ny MD        magnesium hydroxide (MILK OF MAGNESIA) 400 MG/5ML suspension 30 mL  30 mL Oral Daily PRN Selene Ny MD        hydrOXYzine (ATARAX) tablet 25 mg  25 mg Oral TID PRN GISELLE Coulter        lamoTRIgine (LAMICTAL) tablet 50 mg  50 mg Oral Daily GISELLE Coulter   50 mg at 06/07/19 0817    FLUoxetine (PROZAC) capsule 20 mg  20 mg Oral Daily Selene Ny MD   20 mg at 06/07/19 5362       Psychotherapy:   SUPPORTIVE    DSM V Diagnoses:      [facility] PROBLEMS:  Patient Active Problem List   Diagnosis    Weakness    Numbness    Anxiety and depression    Gait abnormality    Suicidal ideation    Mild episode of recurrent major depressive disorder (HCC)             Plan:    Encourage group therapy  15 minute safety checks  Medical monitoring by Dr. Dionna Terry and associates  Continue current therapy and medications  Social work to obtain collateral from her sister     Amount of time spent with patient:  15 minutes with greater than 50% of the time spent in counseling and collaboration of care.     GISELLE Isaac  Clinician Signature: signed electronically

## 2019-06-07 NOTE — PROGRESS NOTES
BHI Daily Shift Assessment  Nursing Progress Note    Room: [facility]/624A-01 Name: Tiffany Santoro Age: 34 y.o. Gender: female   Dx: Mild episode of recurrent major depressive disorder ([address]  Precautions: suicide risk  Target Symptoms:   Accu-Chek: NoSleep: Yes,Sleep Quality Good SI no plan AVH denies [facility]  ADLs: Yes Speech: normal Depression: 8 Anxiety: 8   Participation LevelActive Listener    Participation QualityAppropriate and Attentive    Complaints:    Notes: alert and oriented x4. Pleasant, calm and cooperative. Wearing maroon scrubs. Appearance and  Attire is appropriate and clean. Fairly groomed. Affect is flat and congruent. Evasive, withdrawn. Not social but attending groups. Medication compliant. Appetite is good. Reports good sleep. Patient is tearful during interview. Poor eye contact with fair concentration. Thought processes are linear, logical and goal directed.      Signature: amol terrazas rn

## 2019-06-07 NOTE — PROGRESS NOTES
[facility] Adult Unit Daily Assessment  Nursing Progress Note    Room: [facility]624A-01   Name: Tanya Eisenmenger   Age: 34 y.o. Gender: female   Dx: Mild episode of recurrent major depressive disorder ([address]  Precautions: Suicide Risk  Inpatient Status: voluntary       Sleep: Yes,   Sleep Quality Good   Hours Slept: See rounding sheet  Sleep Medications: No  PRN Sleep Meds: No       Other PRN Meds: No   Med Compliant: Yes  Accu-Chek: No  Oxygen: No  CIWA/CINA: No          SI denies suicidal ideation    HI Negative for homicidal ideation        AVH:Absent      Depression: Unable to assess. Anxiety: Unable to assess. Appetite: fair  Social: No   Speech: normal   Appearance: appropriately dressed and healthy looking    Notes: Patient was in her room most of the evening. Patient resting with eyes closed. Noted no distress. Will continue to monitor.      Electronically signed by Hilda Saul RN on 6/7/19 at 1:04 AM

## 2019-06-07 NOTE — PROGRESS NOTES
Treatment Team Note:    SW met with [address] team to discuss [address] plans.      Progress/Behavior/Group Attendance: attending groups    Target Symptoms/Reason for admission:     Diagnoses: Mild episode of recurrent major depressive disorder       UDS:  THC     BAL: Neg    AftercarePlan: [address] lives with: homeless    Collateral obtained from: mother  On:    Family Session: DAISHAA    Misc:

## 2019-06-07 NOTE — PLAN OF CARE
Problem: Health Behavior:  Goal: Ability to verbalize adaptive coping strategies to use when suicidal feelings occur will improve  Outcome: Ongoing  Goal: Ability to verbalize adaptive coping strategies to use when the urge to self-mutilate occurs will improve  Outcome: Ongoing     Problem: Safety:  Goal: Ability to contract for his/her safety will improve  Outcome: Ongoing     Problem: Suicide risk  Goal: Provide patient with safe environment  Outcome: Ongoing     Problem: Mood - Altered:  Goal: Mood stable  Outcome: Ongoing     Problem: Self-Esteem - Low:  Goal: Demonstrates positive self-esteem  Outcome: Ongoing     Problem: Violence - Risk of, Self/Other-Directed:  Goal: Knowledge of developmental care interventions  Outcome: Ongoing

## 2019-06-07 NOTE — PLAN OF CARE
Problem: Health Behavior:  Goal: Ability to verbalize adaptive coping strategies to use when suicidal feelings occur will improve  6/7/2019 1150 by Hailey Nicholas  Outcome: Ongoing      Group Therapy Note     Date: 6/7/2019  Start Time: 1100  End Time:  8328  Number of Participants: 9     Type of Group: Psychoeducation     Wellness Binder Information  Module Name:  emotional wellness  Session Number:  5     Patient's Goal:  obstacles to emotional wellness     Notes:  pt acknowledged negative thinking as an obstacle to emotional wellness.      Status After Intervention:  Improved     Participation Level: Interactive     Participation Quality: Appropriate        Speech:  normal        Thought Process/Content: Logical        Affective Functioning: Congruent        Mood: congruent        Level of consciousness:  Alert, Oriented x4 and Attentive        Response to Learning: Able to verbalize current knowledge/experience        Endings: None Reported     Modes of Intervention: Education        Discipline Responsible: Psychoeducational Specialist        Signature:  Hailey Nicholas

## 2019-06-08 PROCEDURE — 1240000000 HC EMOTIONAL WELLNESS R&B

## 2019-06-08 PROCEDURE — 6370000000 HC RX 637 (ALT 250 FOR IP): Performed by: PSYCHIATRY & NEUROLOGY

## 2019-06-08 PROCEDURE — 6370000000 HC RX 637 (ALT 250 FOR IP): Performed by: NURSE PRACTITIONER

## 2019-06-08 PROCEDURE — 99232 SBSQ HOSP IP/OBS MODERATE 35: CPT | Performed by: PSYCHIATRY & NEUROLOGY

## 2019-06-08 RX ORDER — TRAZODONE HYDROCHLORIDE 50 MG/1
50 TABLET ORAL
Status: DISCONTINUED | OUTPATIENT
Start: 2019-06-08 | End: 2019-06-08

## 2019-06-08 RX ORDER — TRAZODONE HYDROCHLORIDE 50 MG/1
50 TABLET ORAL NIGHTLY PRN
Status: DISCONTINUED | OUTPATIENT
Start: 2019-06-08 | End: 2019-06-10 | Stop reason: HOSPADM

## 2019-06-08 RX ORDER — NICOTINE 21 MG/24HR
1 PATCH, TRANSDERMAL 24 HOURS TRANSDERMAL DAILY
Status: DISCONTINUED | OUTPATIENT
Start: 2019-06-08 | End: 2019-06-10 | Stop reason: HOSPADM

## 2019-06-08 RX ADMIN — HYDROXYZINE HYDROCHLORIDE 25 MG: 25 TABLET, FILM COATED ORAL at 10:51

## 2019-06-08 RX ADMIN — TRAZODONE HYDROCHLORIDE 50 MG: 50 TABLET ORAL at 22:17

## 2019-06-08 RX ADMIN — FLUOXETINE HYDROCHLORIDE 20 MG: 20 CAPSULE ORAL at 08:41

## 2019-06-08 RX ADMIN — HYDROXYZINE HYDROCHLORIDE 25 MG: 25 TABLET, FILM COATED ORAL at 21:12

## 2019-06-08 RX ADMIN — LAMOTRIGINE 50 MG: 25 TABLET ORAL at 08:41

## 2019-06-08 NOTE — PROGRESS NOTES
[facility] Adult Unit Daily Assessment  Nursing Progress Note    [facility]   Name: Kerry Escamilla   Age: 34 y.o. Gender: female   Dx: Mild episode of recurrent major depressive disorder ([address]  Precautions: Suicide risk  Inpatient Status: voluntary       Sleep: Yes,   Sleep Quality Good   Hours Slept: See rounding sheet  Sleep Medications: Yes  PRN Sleep Meds: No       Other PRN Meds: No   Med Compliant: Yes   Accu-Chek: No   Oxygen: No  CIWA/CINA: No          SI denies suicidal ideation    HI Negative for homicidal ideation        AVH:Absent      Depression: 8   Anxiety: 9       Appetite: good    Social: No   Speech: normal   Appearance: appropriately dressed and healthy looking    Group Participation: No  Participation LevelNone    Participation Quality n/a    Notes: Patient noted to be tearful. Patient stated that her mother had informed her that she could not see her children anymore and she had to move out of town per the . Patients  mother told her that she was in trouble for abdondment  her children. Patient stated that her children had been dropped of at their fathers house before she came to the hospital. Patient reported that she was planning on staying with her older sister. Patient was cooperated with staff. Will continue to monitor.      Electronically signed by Miller Castaneda RN on 6/8/19 at 1:37 AM

## 2019-06-08 NOTE — PROGRESS NOTES
Group Therapy Note    Date: 6/8/2019  Start Time: 0900  End Time:  0930  Number of Participants: 10    Type of Group: Community Meeting    Patient's Goal:  \"Working on discharging. Finding a plan and working on a plan. \"    Status After Intervention:  Unchanged    Participation Level:  Active Listener    Participation Quality: Appropriate      Speech:  normal      Thought Process/Content: Logical  Linear      Affective Functioning: Congruent      Mood: anxious and depressed      Level of consciousness:  Oriented x4      Response to Learning: Able to verbalize current knowledge/experience      Endings: None Reported    Modes of Intervention: Support      Discipline Responsible: Registered Nurse      Signature:  Kiran Edward RN

## 2019-06-08 NOTE — PLAN OF CARE
Problem: Health Behavior:  Goal: Ability to verbalize adaptive coping strategies to use when the urge to self-mutilate occurs will improve  Description  Ability to verbalize adaptive coping strategies to use when the urge to self-mutilate occurs will improve  Outcome: Ongoing     Problem: Self-Esteem - Low:  Goal: Demonstrates positive self-esteem  Description  Demonstrates positive self-esteem  Outcome: Ongoing                                                                       Group Therapy Note     Date: 6/8/2019  Start Time: 1000  End Time:  8286  Number of Participants: 10     Type of Group: Psychotherapy     Wellness Binder Information  Module Name:  [address] [place] [address]  Session Number:  2     Patient's Goal:  Patient will be able to identify symptoms of stress     Notes:  Patient participated in discussion about patient's own symptoms of stress and ways to manage stress. Status After Intervention:  Improved     Participation Level:  Active Listener     Participation Quality: Attentive        Speech:  normal        Thought Process/Content: Linear        Affective Functioning: Congruent        Mood: depressed        Level of consciousness:  Attentive        Response to Learning: Able to verbalize/acknowledge new learning        Endings: None Reported     Modes of Intervention: Support        Discipline Responsible: /Counselor        Signature:  LUDMILA Calloway

## 2019-06-08 NOTE — PROGRESS NOTES
BHI Daily Shift Assessment  Nursing Progress Note    Room: [facility]/624A-01 Name: Ryan Maldonado Age: 34 y.o. Gender: female   Dx: Mild episode of recurrent major depressive disorder ([address]  Precautions: suicide risk  Target Symptoms:   Accu-Chek: NoSleep: Yes,Sleep Quality Fair SI no plan [facility]  ADLs: Yes Speech: normal Depression: 8 Anxiety: 8   Participation LevelActive Listener  Visitation: No   Participation QualityAppropriate and Attentive    Complaints:    Notes: Patient is awake and showered. Grooming and hygiene is good. She reports her mood as being \" nervous\". She reports she understands from her mother that the  said all of her kids are going to be taken away from her. She is crying and wants to talk to a . She reports\" I live for my kids and reports  her firstborn \" saved my life\" . She is calm and cooperative with the staff and peers. She is attending groups.      Signature:

## 2019-06-08 NOTE — PROGRESS NOTES
6/8/2019 4:24 PM   Progress Note        Tiffany Santoro 1990  Psychotherapy Time Spent: 15 min      Psychotherapy Topics: family      Subjective:  Good news    Patient reports side effects as follows: none. Reports no si . Reports compliance with medications as compliant all of the time. Review of Systems - Psychological ROS: positive for - improving depressed mood    Current Meds:    Prior to Admission medications    Medication Sig Start Date End Date Taking? Authorizing Provider   vitamin D (ERGOCALCIFEROL) 31218 units capsule Take 1 capsule by mouth once a week for 11 doses 6/7/19 8/17/19 Yes Mookie Cespedes MD   hydrOXYzine (ATARAX) 10 MG tablet Take 10 mg by mouth 2 times daily as needed for Itching   Yes Historical Provider, MD   lamoTRIgine (LAMICTAL) 100 MG tablet Take by mouth daily    Yes Historical Provider, MD   FLUoxetine (PROZAC) 20 MG capsule  2/28/17  Yes Historical Provider, MD     Current Facility-Administered Medications: nicotine (NICODERM CQ) 21 MG/24HR 1 patch, 1 patch, Transdermal, Daily  vitamin D (ERGOCALCIFEROL) capsule 50,000 Units, 50,000 Units, Oral, Weekly  acetaminophen (TYLENOL) tablet 650 mg, 650 mg, Oral, Q4H PRN  magnesium hydroxide (MILK OF MAGNESIA) 400 MG/5ML suspension 30 mL, 30 mL, Oral, Daily PRN  hydrOXYzine (ATARAX) tablet 25 mg, 25 mg, Oral, TID PRN  lamoTRIgine (LAMICTAL) tablet 50 mg, 50 mg, Oral, Daily  FLUoxetine (PROZAC) capsule 20 mg, 20 mg, Oral, Daily    MSE:  Patient is  A & O x3. Appearance:  hospital attire and in chair  Cognition:  Recent memory intact , remote memory intact , good fund of knowledge,  average intelligence level.    Speech:  normal  Behavior:  Cooperative  Mood: euthymic  Affect: congruent with mood  Thought Content: no evidence of delusions  Thought Process: linear and goal directed  Judgement Insight:  normal and appropriate      Assesment:   Active Hospital Problems    Diagnosis Date Noted    Suicidal ideation [R45.851] 06/06/2019

## 2019-06-08 NOTE — PLAN OF CARE
Problem: Health Behavior:  Goal: Ability to verbalize adaptive coping strategies to use when suicidal feelings occur will improve  Description  Ability to verbalize adaptive coping strategies to use when suicidal feelings occur will improve  6/8/2019 1548 by LUDMILA Laboy  Outcome: Ongoing     Problem: Health Behavior:  Goal: Ability to verbalize adaptive coping strategies to use when the urge to self-mutilate occurs will improve  Description  Ability to verbalize adaptive coping strategies to use when the urge to self-mutilate occurs will improve  6/8/2019 1548 by LUDMILA Laboy  Outcome: Ongoing  6/8/2019 1130 by Zainab Mckenzie RN  Outcome: Ongoing  6/8/2019 1128 by LUDMILA Laboy  Outcome: Ongoing                                                                      Group Therapy Note     Date: 6/8/2019  Start Time: 1400  End Time:  1445  Number of Participants: 13     Type of Group: Relapse Prevention     Wellness Binder Information  Module Name:  Relapse Prevention  Session Number:  1     Patient's Goal:  Patient will identify basic human needs and identify ways to meet these needs. Notes:  Patient participated in group discussion about mental, emotional, physical and spiritual needs and talked about ways these needs could be met.       Status After Intervention:  Improved     Participation Level: Interactive     Participation Quality: Sharing        Speech:  normal        Thought Process/Content: Linear        Affective Functioning: Congruent        Mood: depressed        Level of consciousness:  Alert        Response to Learning: Able to verbalize/acknowledge new learning        Endings: None Reported     Modes of Intervention: Education and Exploration        Discipline Responsible: /Counselor        Signature:  LUDMILA Laboy

## 2019-06-09 PROCEDURE — 6370000000 HC RX 637 (ALT 250 FOR IP): Performed by: NURSE PRACTITIONER

## 2019-06-09 PROCEDURE — 1240000000 HC EMOTIONAL WELLNESS R&B

## 2019-06-09 PROCEDURE — 6370000000 HC RX 637 (ALT 250 FOR IP): Performed by: PSYCHIATRY & NEUROLOGY

## 2019-06-09 RX ADMIN — FLUOXETINE HYDROCHLORIDE 20 MG: 20 CAPSULE ORAL at 08:24

## 2019-06-09 RX ADMIN — HYDROXYZINE HYDROCHLORIDE 25 MG: 25 TABLET, FILM COATED ORAL at 17:16

## 2019-06-09 RX ADMIN — LAMOTRIGINE 50 MG: 25 TABLET ORAL at 08:24

## 2019-06-09 NOTE — PROGRESS NOTES
Group Note    Number of Participants in Group: 12  Number of Patients on Unit:15  Patient attended group:Yes  Reason for Absence:  Intervention for patient absence:        Type of Group:   Wrap-Up/Relaxation             Patient Response to Learning: Yes       Notes/Comments:             Electronically signed by Jessica Worrell on 6/8/19 at 10:40 PM

## 2019-06-09 NOTE — PLAN OF CARE
Problem: Health Behavior:  Goal: Ability to verbalize adaptive coping strategies to use when suicidal feelings occur will improve  6/9/2019 1556 by LUDMILA Lim  Outcome: Ongoing  6/9/2019 1015 by Carmen Monae RN  Outcome: Ongoing                                                                          Group Therapy Note     Date: 6/9/2019  Start Time: 1330  End Time:  0042  Number of Participants: 12     Type of Group: Cognitive Skills     Wellness Binder Information  Module Name:  Emotional Wellness  Session Number:  4     Patient's Goal:  Patient will be able to verbalize positive self-affirmations about self. Notes:  Patient attended group meeting today. Patient was able to list positive self-affirmations about self. Handouts on positive self-statements were given to patient. Status After Intervention:  Improved     Participation Level:  Active Listener     Participation Quality: Sharing        Speech:  normal        Thought Process/Content: Linear        Affective Functioning: Congruent        Mood: depressed        Level of consciousness:  Oriented x4        Response to Learning: Able to verbalize/acknowledge new learning        Endings: None Reported     Modes of Intervention: Support        Discipline Responsible: /Counselor        Signature:  LUDMILA Lim

## 2019-06-09 NOTE — PROGRESS NOTES
BHI Daily Shift Assessment  Nursing Progress Note    Room: [facility]/624A-01 Name: Kristie Montes Age: 34 y.o. Gender: female   Dx: Mild episode of recurrent major depressive disorder ([address]  Precautions: suicide risk  Target Symptoms:   Accu-Chek: NoSleep: Yes,Sleep Quality Good SI no plan [facility]  ADLs: Yes Speech: normal Depression: 7 Anxiety: 7   Participation LEVEL lActive Listener  Visitation: Yes   Participation QualityAppropriate    Complaints:    Notes: Social on the unit. She is calm and cooperative with peers and staff. He is attending and participating in groups. Reported improved sleep last night. She has been social  today more that usual.     Signature:  Bailey Hand RN

## 2019-06-09 NOTE — GROUP NOTE
Group Therapy Note    Date: June 9    Group Start Time: 1600  Group End Time: 1700  Group Topic: Recreational    [facility]    Chanel Reyes RN         Group Therapy Note    Attendees:                   Patient's Goal: recreational  Notes:       Status After Intervention:  Improved    Participation Level: Active Listener and Interactive    Participation Quality: Appropriate, Attentive, Sharing and Supportive      Speech:  normal      Thought Process/Content: Logical  Linear      Affective Functioning: Congruent      Mood: depressed      Level of consciousness:  Alert and Oriented x4      Response to Learning: Able to verbalize current knowledge/experience and Able to verbalize/acknowledge new learning      Endings: None Reported    Modes of Intervention: Socialization      Discipline Responsible: Registered Nurse      Signature:   Chanel Reyes RN

## 2019-06-09 NOTE — PROGRESS NOTES
Group Note    Number of Participants in Group: 15  Number of Patients on Unit:12  Patient attended group:Yes  Reason for Absence:  Intervention for patient absence:        Type of Group:   Wrap-Up/Relaxation    Patient's Goal:    Participation Level:     Active Listener, Interactive, Monopolizing, Minimal and None         Patient Response to Learning: Yes       Notes/Comments:             Electronically signed by Norma Card on 6/8/19 at 10:34 PM

## 2019-06-09 NOTE — PLAN OF CARE
Problem: Health Behavior:  Goal: Ability to verbalize adaptive coping strategies to use when suicidal feelings occur will improve  Description  Ability to verbalize adaptive coping strategies to use when suicidal feelings occur will improve  Outcome: Ongoing  Goal: Ability to verbalize adaptive coping strategies to use when the urge to self-mutilate occurs will improve  Description  Ability to verbalize adaptive coping strategies to use when the urge to self-mutilate occurs will improve  Outcome: Ongoing     Problem: Safety:  Goal: Ability to contract for his/her safety will improve  Description  Ability to contract for his/her safety will improve  Outcome: Ongoing     Problem: Suicide risk  Goal: Provide patient with safe environment  Description  Provide patient with safe environment  Outcome: Ongoing     Problem: Mood - Altered:  Goal: Mood stable  Description  Mood stable  Outcome: Ongoing     Problem: Self-Esteem - Low:  Goal: Demonstrates positive self-esteem  Description  Demonstrates positive self-esteem  Outcome: Ongoing     Problem: Violence - Risk of, Self/Other-Directed:  Goal: Knowledge of developmental care interventions  Description  Absence of violence  Outcome: Ongoing

## 2019-06-10 VITALS
DIASTOLIC BLOOD PRESSURE: 70 MMHG | WEIGHT: 217.8 LBS | BODY MASS INDEX: 34.18 KG/M2 | HEIGHT: 67 IN | TEMPERATURE: 97.3 F | OXYGEN SATURATION: 100 % | RESPIRATION RATE: 24 BRPM | HEART RATE: 68 BPM | SYSTOLIC BLOOD PRESSURE: 139 MMHG

## 2019-06-10 PROCEDURE — 99238 HOSP IP/OBS DSCHRG MGMT 30/<: CPT | Performed by: NURSE PRACTITIONER

## 2019-06-10 PROCEDURE — 6370000000 HC RX 637 (ALT 250 FOR IP): Performed by: PSYCHIATRY & NEUROLOGY

## 2019-06-10 PROCEDURE — 6370000000 HC RX 637 (ALT 250 FOR IP): Performed by: NURSE PRACTITIONER

## 2019-06-10 RX ORDER — LAMOTRIGINE 25 MG/1
50 TABLET ORAL DAILY
Qty: 60 TABLET | Refills: 0 | Status: SHIPPED | OUTPATIENT
Start: 2019-06-11 | End: 2022-05-03

## 2019-06-10 RX ORDER — TRAZODONE HYDROCHLORIDE 50 MG/1
50 TABLET ORAL NIGHTLY PRN
Qty: 30 TABLET | Refills: 0 | Status: SHIPPED | OUTPATIENT
Start: 2019-06-10 | End: 2022-05-03

## 2019-06-10 RX ORDER — HYDROXYZINE HYDROCHLORIDE 25 MG/1
25 TABLET, FILM COATED ORAL 3 TIMES DAILY PRN
Qty: 30 TABLET | Refills: 0 | Status: SHIPPED | OUTPATIENT
Start: 2019-06-10 | End: 2019-06-20

## 2019-06-10 RX ORDER — ERGOCALCIFEROL (VITAMIN D2) 1250 MCG
50000 CAPSULE ORAL WEEKLY
Qty: 12 CAPSULE | Refills: 0 | Status: SHIPPED | OUTPATIENT
Start: 2019-06-10 | End: 2022-05-03

## 2019-06-10 RX ORDER — NICOTINE 21 MG/24HR
1 PATCH, TRANSDERMAL 24 HOURS TRANSDERMAL DAILY
Qty: 30 PATCH | Refills: 0 | Status: SHIPPED | OUTPATIENT
Start: 2019-06-10 | End: 2020-08-18 | Stop reason: ALTCHOICE

## 2019-06-10 RX ORDER — FLUOXETINE HYDROCHLORIDE 20 MG/1
20 CAPSULE ORAL DAILY
Qty: 30 CAPSULE | Refills: 0 | Status: SHIPPED | OUTPATIENT
Start: 2019-06-11 | End: 2022-05-03 | Stop reason: SDUPTHER

## 2019-06-10 RX ADMIN — FLUOXETINE HYDROCHLORIDE 20 MG: 20 CAPSULE ORAL at 08:41

## 2019-06-10 RX ADMIN — LAMOTRIGINE 50 MG: 25 TABLET ORAL at 08:41

## 2019-06-10 NOTE — DISCHARGE SUMMARY
Studies: labs:     Lab Results   Component Value Date    WBC 9.7 06/06/2019    HGB 12.8 06/06/2019    HCT 39.6 06/06/2019    MCV 82.7 06/06/2019     06/06/2019     Lab Results   Component Value Date     06/07/2019    K 3.9 06/07/2019     06/07/2019    CO2 24 06/07/2019    BUN 15 06/07/2019    CREATININE 0.9 06/07/2019    GLUCOSE 90 06/07/2019    CALCIUM 9.1 06/07/2019      Lab Results   Component Value Date    TSHFT4 0.31 (L) 06/07/2019     . Lab Results   Component Value Date    VITD25 23.8 (L) 06/07/2019       Results for Willie Barry (MRN [id number]) as of 6/10/2019 15:21   Ref. Range 6/6/2019 04:23   Albumin Latest Ref Range: 3.5 - 5.2 g/dL 5.0   Alk Phos Latest Ref Range: 35 - 104 U/L 74   ALT Latest Ref Range: 5 - 33 U/L 16   AST Latest Ref Range: 5 - 32 U/L 16   Bilirubin Latest Ref Range: 0.2 - 1.2 mg/dL 0.6   Total Protein Latest Ref Range: 6.6 - 8.7 g/dL 8.7     Results for Willie Barry (MRN [id number]) as of 6/10/2019 15:21   Ref. Range 6/6/2019 04:20   Amphetamine Screen, Urine Latest Ref Range: Negative <1000 ng/mL  Negative   Barbiturate Screen, Ur Latest Ref Range: Negative < 200 ng/mL  Negative   Benzodiazepine Screen, Urine Latest Ref Range: Negative <100 ng/mL  Negative   Cannabinoid Scrn, Ur Latest Ref Range: Negative <50 ng/mL  Positive (A)   Opiate Scrn, Ur Latest Ref Range: Negative < 300 ng/mL  Negative   Cocaine Metabolite Screen, Urine Latest Ref Range: Negative <300 ng/mL  Negative         Treatments: therapies: RN and SW    Alert, Oriented X 4  Appearance:  Grooming and Hygiene attended to  Speech with Regular Rate and Rhythm  Eye Contact:  Good  No Psychomotor Agitation/Retardation Noted  Attitude:  Cooperative  Mood:  \"I feel better than I have in a long time. \"  Affective: Congruent, appropriate to the situation, with a normal range and intensity  Thought Processes:  Coherently communicated, logical and goal oriented  Thought Content:  At this time  No Suicidal Ideation, throughout       Disposition: home    Patient Instructions:   Discharge Medication List as of 6/10/2019 10:18 AM      START taking these medications    Details   traZODone (DESYREL) 50 MG tablet Take 1 tablet by mouth nightly as needed for Sleep, Disp-30 tablet, R-0Normal      nicotine (NICODERM CQ) 21 MG/24HR Place 1 patch onto the skin daily, Disp-30 patch, R-0Normal         CONTINUE these medications which have CHANGED    Details   FLUoxetine (PROZAC) 20 MG capsule Take 1 capsule by mouth daily, Disp-30 capsule, R-0Normal      hydrOXYzine (ATARAX) 25 MG tablet Take 1 tablet by mouth 3 times daily as needed for Anxiety, Disp-30 tablet, R-0Normal      lamoTRIgine (LAMICTAL) 25 MG tablet Take 2 tablets by mouth daily, Disp-60 tablet, R-0Normal      ergocalciferol (ERGOCALCIFEROL) 70311 units capsule Take 1 capsule by mouth once a week for 12 doses, Disp-12 capsule, R-0Normal           Activity: activity as tolerated  Diet: regular diet  Wound Care: none needed    Follow-up with  PCP in 2 weeks.     [facility] ON 6/14/19 AT 10 AM AND 6/20/19 AT 4PM    Time worked: Less than 30 minutes    Participation:good    Electronically signed by GISELLE Pfeiffer on 6/10/2019 at 3:13 PM

## 2019-06-10 NOTE — PROGRESS NOTES
[facility] Daily Shift Assessment  Nursing Progress Note    Room: [facility]01 Name: Lucille Barnard Age: 34 y.o.     Gender: female   Dx: Mild episode of recurrent major depressive disorder ([address]  Precautions: suicide risk  Target Symptoms:   Accu-Chek: NoSleep: Yes,Sleep Quality Good SI Denies AVH Denies [facility]  ADLs: Yes Speech: normal Depression: 0 Anxiety: 0   Participation LevelActive Listener, Interactive and Monopolizing  Visitation: No   Participation QualityAppropriate, Attentive, Sharing and Supportive    Electronically signed by Ashsih Castaneda RN on 6/10/2019 at 9:29 AM

## 2019-06-10 NOTE — PROGRESS NOTES
[facility] Adult Unit Daily Assessment  Nursing Progress Note    Room: [facility]/624A-01   Name: Bob Berger   Age: 34 y.o. Gender: female   Dx: Mild episode of recurrent major depressive disorder (HCC)  Precautions: suicide risk  Inpatient Status: voluntary       Sleep: Yes,   Sleep Quality Good   Hours Slept: 8   Sleep Medications: No  PRN Sleep Meds: No       Other PRN Meds: No   Med Compliant: N/A   Accu-Chek: No   Oxygen: No  CIWA/CINA: No          SI denies suicidal ideation    HI Negative for homicidal ideation        AVH:Absent      Depression: 0   Anxiety: 0       Appetite: good    Social: Yes   Speech: normal   Appearance: appropriately dressed, appropriately groomed, good hygiene and healthy looking    Group Participation: Yes  Participation LevelActive Listener and Interactive    Participation QualityAppropriate, Attentive and Sharing    Notes:   Patient reports over all \"good\" day, reports feeling better, reports no depression/anxiety, has been out in  Day area social and interacting with peers, reports no issues with sleep, slept good and appetite is good.   Electronically signed by Selma Gore LPN on 0/8/15 at 32:82 PM

## 2019-06-10 NOTE — PROGRESS NOTES
Patient belongings and valuables inventoried with patient and staff. Scripts, medications, and appointments  reviewed with verbal readback understanding by patient with patient to continue routine discharge medications until discontinued by your provider. Patient denies SI, HI and AVH at this time, is not delusional, psychotic, paranoid or manic.        Electronically signed by Clint Mcnair RN on 6/10/2019 at 2:01 PM

## 2019-06-10 NOTE — PROGRESS NOTES
Discharge Note     Pt discharging on this date. Pt denies SI, HI, and AVH at this time. Pt reports improvement in behavior and is leaving unit in overall good condition. SW and pt discussed pt's follow up appointments and importance of attending appointments as scheduled, pt voiced understanding and agreement. Pt and SW also discussed pt safety plan and pt able to verbally identify: warning signs, coping strategies, places and people that help make the pt feel better/distract negative thoughts, friends/family/agencies/professionals the pt can reach out to in a crisis, and something that is important to the pt/worth living for. Pt provided the national suicide prevention hotline number (7-443-064-567-900-4514) as well as local community behavioral health [facility]) crisis number for emergencies (6-706-505-199-223-4845). [facility]  [address] on 06__/14__/19 @ 10:00am. Patient will follow up with Dr. Betsy Forte at [facility] for medication management, patient will be seen on _06_/_20_/19 @ 4:00pm for the med management appt. Referral to out patient tobacco cessation counseling treatment:    Patient refused tobacco cessation counseling    SW offered to assist pt with transportation, pt reports that she has transportation.

## 2019-06-11 NOTE — PROGRESS NOTES
Follow up call completed. Writer was not able to speak with the patient. No answer, voicemail left with contact information for unit, if pt had any questions or concerns.    Electronically signed by LUDMILA Muniz on 6/11/2019 at 2:54 PM

## 2019-09-18 ENCOUNTER — OFFICE VISIT (OUTPATIENT)
Dept: URGENT CARE | Age: 29
End: 2019-09-18
Payer: MEDICAID

## 2019-09-18 VITALS
OXYGEN SATURATION: 99 % | RESPIRATION RATE: 18 BRPM | DIASTOLIC BLOOD PRESSURE: 87 MMHG | HEART RATE: 84 BPM | TEMPERATURE: 98 F | HEIGHT: 67 IN | SYSTOLIC BLOOD PRESSURE: 136 MMHG | WEIGHT: 217 LBS | BODY MASS INDEX: 34.06 KG/M2

## 2019-09-18 DIAGNOSIS — J02.9 SORE THROAT: Primary | ICD-10-CM

## 2019-09-18 DIAGNOSIS — Z20.818 STREPTOCOCCUS EXPOSURE: ICD-10-CM

## 2019-09-18 LAB — S PYO AG THROAT QL: NORMAL

## 2019-09-18 PROCEDURE — G8427 DOCREV CUR MEDS BY ELIG CLIN: HCPCS | Performed by: NURSE PRACTITIONER

## 2019-09-18 PROCEDURE — 4004F PT TOBACCO SCREEN RCVD TLK: CPT | Performed by: NURSE PRACTITIONER

## 2019-09-18 PROCEDURE — G8417 CALC BMI ABV UP PARAM F/U: HCPCS | Performed by: NURSE PRACTITIONER

## 2019-09-18 PROCEDURE — 99213 OFFICE O/P EST LOW 20 MIN: CPT | Performed by: NURSE PRACTITIONER

## 2019-09-18 PROCEDURE — 87880 STREP A ASSAY W/OPTIC: CPT | Performed by: NURSE PRACTITIONER

## 2019-09-18 RX ORDER — AZITHROMYCIN 250 MG/1
250 TABLET, FILM COATED ORAL SEE ADMIN INSTRUCTIONS
Qty: 6 TABLET | Refills: 0 | Status: SHIPPED | OUTPATIENT
Start: 2019-09-18 | End: 2019-09-23

## 2019-09-18 ASSESSMENT — ENCOUNTER SYMPTOMS
COUGH: 0
WHEEZING: 0
TROUBLE SWALLOWING: 1
SORE THROAT: 1
VOMITING: 0
EYES NEGATIVE: 1
VOICE CHANGE: 0
RHINORRHEA: 0
NAUSEA: 1
ABDOMINAL PAIN: 0
RESPIRATORY NEGATIVE: 1

## 2019-09-18 NOTE — LETTER
[facility]  [address] [place] [id number]  Phone: [phone]    GISELLE Caruso CNP        September 18, 2019     Patient: Naz Juan   YOB: 1990   Date of Visit: 9/18/2019       To Whom it May Concern:    Naz Juan was seen in my clinic on 9/18/2019. She may return to work on 09/20/2019. If you have any questions or concerns, please don't hesitate to call.     Sincerely,         GISELLE Caruso CNP

## 2019-09-18 NOTE — PROGRESS NOTES
[facility]  [address] [place] [address] [address]  [address] [place] [id number]  Dept: [phone]  Loc: [phone]     Pauline Buck is a 34 y.o. female who presents today for her medical conditions/complaintsas noted below. Pauline Buck is c/o of Pharyngitis        HPI:     HPI     Pharyngitis  This is a new problem. The current episode started in the past 7 days. The problem occurs constantly. The problem has been unchanged. Associated symptoms include chills, fatigue, a fever, headaches, a sore throat and swollen glands. Pertinent negatives include no abdominal pain, anorexia, arthralgias, change in bowel habit, chest pain, congestion, coughing, joint swelling, myalgias, nausea, neck pain, numbness, rash, urinary symptoms, vertigo, visual change, vomiting or weakness. The symptoms are aggravated by eating and drinking. The patient has tried ibuprofen for the symptoms. The treatment provided no relief. Results for orders placed or performed in visit on 19   POCT rapid strep A   Result Value Ref Range    Strep A Ag None Detected None Detected       Past Medical History:   Diagnosis Date    Anxiety     Depression, acute 2019      Past Surgical History:   Procedure Laterality Date    APPENDECTOMY       SECTION      TUBAL LIGATION         History reviewed. No pertinent family history.     Social History     Tobacco Use    Smoking status: Current Every Day Smoker     Packs/day: 0.25    Smokeless tobacco: Never Used   Substance Use Topics    Alcohol use: No     Alcohol/week: 0.0 standard drinks      Current Outpatient Medications   Medication Sig Dispense Refill    azithromycin (ZITHROMAX) 250 MG tablet Take 1 tablet by mouth See Admin Instructions for 5 days 500mg on day 1 followed by 250mg on days 2 - 5 6 tablet 0    FLUoxetine (PROZAC) 20 MG capsule Take 1 capsule by mouth daily 30 capsule 0    lamoTRIgine (LAMICTAL) 25 MG tablet Take 2 tablets SpO2 99%   BMI 33.99 kg/m²     Assessment:          Diagnosis Orders   1. Sore throat  POCT rapid strep A   2. Streptococcus exposure  azithromycin (ZITHROMAX) 250 MG tablet       Plan:   1. Treating strep exposure with antibiotic  2. Pt agreed to plan   Orders Placed This Encounter   Procedures    POCT rapid strep A        No follow-ups on file. Orders Placed This Encounter   Procedures    POCT rapid strep A     Orders Placed This Encounter   Medications    azithromycin (ZITHROMAX) 250 MG tablet     Sig: Take 1 tablet by mouth See Admin Instructions for 5 days 500mg on day 1 followed by 250mg on days 2 - 5     Dispense:  6 tablet     Refill:  0       Patient given educationalmaterials - see patient instructions. Discussed use, benefit, and side effectsof prescribed medications. All patient questions answered. Pt voiced understanding. Reviewed health maintenance. Instructed to continue current medications, diet andexercise. Patient agreed with treatment plan. Follow up as directed. Patient Instructions   1. Take antibiotic as prescribed and be sure to complete full course  2. Throw toothbrush away after 2 days  3. May alternate tylenol/motrin as needed for fever/sore throat (dose discussed)  4. Follow up with PCP as needed  5. May return to work/school 24 hours after starting antibiotic and no fever.    6. Return to clinic if symptoms worsen or fail to improve            Electronically signed by GISELLE Albrecht CNP on 9/18/2019 at 1:41 PM

## 2019-10-08 NOTE — PROGRESS NOTES
Chief Complaint   Patient presents with    New Patient     Referred by Luisito Ritter for facial droopin, left sided weakness, slurred speech       Pineda Hinkle is a 34y.o. year old female who is seen for evaluation of left sided weakness, speech difficulties and gait difficulties. The patient indicates that she went to the hospital 2018. Within 2 hours, the patient noted left  face, arm and leg weakness and heaviness along with speech difficulties and gait abnormalities. She came to [facility] emergency room with an unremarkable MRI. She denies any previous history of stroke, blood clots or miscarriages. She states she does have significant stress. Possible conversion disorder was suggested. Active Ambulatory Problems     Diagnosis Date Noted    Weakness 2018    Numbness 2018    Anxiety and depression 2018    Gait abnormality 2018     Resolved Ambulatory Problems     Diagnosis Date Noted    No Resolved Ambulatory Problems     Past Medical History:   Diagnosis Date    Anxiety        Past Surgical History:   Procedure Laterality Date    APPENDECTOMY       SECTION      TUBAL LIGATION         History reviewed. No pertinent family history. Allergies   Allergen Reactions    Penicillins Anaphylaxis       Social History     Social History    Marital status: Single     Spouse name: N/A    Number of children: N/A    Years of education: N/A     Occupational History    Not on file. Social History Main Topics    Smoking status: Current Every Day Smoker     Packs/day: 0.25    Smokeless tobacco: Never Used    Alcohol use No    Drug use: No    Sexual activity: Not on file     Other Topics Concern    Not on file     Social History Narrative    No narrative on file       Review of Systems     Constitutional - No fever or chills. yes diaphoresis or significant fatigue. HENT -  No tinnitus or significant hearing loss.   Eyes - no sudden vision change or eye pain  Respiratory - yes significant shortness of breath or cough  Cardiovascular - no chest pain No palpitations or significant leg swelling  Gastrointestinal - no abdominal swelling or pain. Genitourinary - No difficulty urinating, dysuria  Musculoskeletal - no back pain or myalgia. Skin - no color change or rash  Neurologic - No seizures. No lateralizing weakness. Hematologic - yes easy bruising or excessive bleeding. Psychiatric - yes severe anxiety or nervousness. All other review of systems are negative. Current Outpatient Prescriptions   Medication Sig Dispense Refill    lamoTRIgine (LAMICTAL) 100 MG tablet Take by mouth      FLUoxetine (PROZAC) 20 MG capsule       dicyclomine (BENTYL) 10 MG capsule Take 1 capsule by mouth 3 times daily as needed (abd cramping) 120 capsule 3     No current facility-administered medications for this visit. /86   Pulse 78   Ht 5' 7\" (1.702 m)   Wt 214 lb (97.1 kg)   BMI 33.52 kg/m²     Constitutional - well developed, well nourished. Eyes - conjunctiva normal.  Pupils react to light  Ear, nose, throat -hearing intact to finger rub No scars, masses, or lesions over external nose or ears, no atrophy of tongue  Neck-symmetric, no masses noted, no jugular vein distension  Respiration- chest wall appears symmetric, good expansion,   normal effort without use of accessory muscles  Musculoskeletal - no significant wasting of muscles noted, no bony deformities  Extremities-no clubbing, cyanosis or edema  Skin - warm, dry, and intact. No rash, erythema, or pallor.   Psychiatric - mood, affect, and behavior appear normal.      Neurological exam  Awake, alert, fluent oriented x 3 appropriate affect  Attention and concentration appear appropriate  Recent and remote memory appears unremarkable  Speech slow almost spastic and hesitant  No clear issues with language of fund of knowledge    Cranial Nerve Exam   CN II- Visual fields grossly unremarkable  CN 178

## 2020-01-22 ENCOUNTER — HOSPITAL ENCOUNTER (EMERGENCY)
Age: 30
Discharge: HOME | End: 2020-01-22

## 2020-01-22 VITALS
SYSTOLIC BLOOD PRESSURE: 131 MMHG | WEIGHT: 229 LBS | HEIGHT: 67 IN | TEMPERATURE: 98.2 F | BODY MASS INDEX: 35.94 KG/M2 | OXYGEN SATURATION: 94 % | RESPIRATION RATE: 18 BRPM | HEART RATE: 79 BPM | DIASTOLIC BLOOD PRESSURE: 89 MMHG

## 2020-01-22 LAB
BACTERIA: ABNORMAL /HPF
BILIRUBIN URINE: NEGATIVE
BLOOD, URINE: NEGATIVE
CLARITY: CLEAR
COLOR: YELLOW
EPITHELIAL CELLS, UA: 3 /HPF (ref 0–5)
GLUCOSE URINE: NEGATIVE MG/DL
HCG(URINE) PREGNANCY TEST: NEGATIVE
HYALINE CASTS: 0 /HPF (ref 0–8)
KETONES, URINE: NEGATIVE MG/DL
LEUKOCYTE ESTERASE, URINE: ABNORMAL
NITRITE, URINE: NEGATIVE
PH UA: 6.5 (ref 5–8)
PROTEIN UA: NEGATIVE MG/DL
RBC UA: 2 /HPF (ref 0–4)
SPECIFIC GRAVITY UA: 1.01 (ref 1–1.03)
URINE REFLEX TO CULTURE: YES
UROBILINOGEN, URINE: 0.2 E.U./DL
WBC UA: 7 /HPF (ref 0–5)

## 2020-01-22 PROCEDURE — 6360000002 HC RX W HCPCS: Performed by: PHYSICIAN ASSISTANT

## 2020-01-22 PROCEDURE — 96372 THER/PROPH/DIAG INJ SC/IM: CPT

## 2020-01-22 PROCEDURE — 99283 EMERGENCY DEPT VISIT LOW MDM: CPT

## 2020-01-22 PROCEDURE — 87591 N.GONORRHOEAE DNA AMP PROB: CPT

## 2020-01-22 PROCEDURE — 87491 CHLMYD TRACH DNA AMP PROBE: CPT

## 2020-01-22 PROCEDURE — 87086 URINE CULTURE/COLONY COUNT: CPT

## 2020-01-22 PROCEDURE — 81001 URINALYSIS AUTO W/SCOPE: CPT

## 2020-01-22 PROCEDURE — 6370000000 HC RX 637 (ALT 250 FOR IP): Performed by: PHYSICIAN ASSISTANT

## 2020-01-22 PROCEDURE — 84703 CHORIONIC GONADOTROPIN ASSAY: CPT

## 2020-01-22 RX ORDER — CEFTRIAXONE 1 G/1
250 INJECTION, POWDER, FOR SOLUTION INTRAMUSCULAR; INTRAVENOUS ONCE
Status: COMPLETED | OUTPATIENT
Start: 2020-01-22 | End: 2020-01-22

## 2020-01-22 RX ORDER — CEPHALEXIN 500 MG/1
500 CAPSULE ORAL 2 TIMES DAILY
Qty: 14 CAPSULE | Refills: 0 | Status: SHIPPED | OUTPATIENT
Start: 2020-01-22 | End: 2020-01-29

## 2020-01-22 RX ORDER — METRONIDAZOLE 500 MG/1
2000 TABLET ORAL ONCE
Status: COMPLETED | OUTPATIENT
Start: 2020-01-22 | End: 2020-01-22

## 2020-01-22 RX ORDER — AZITHROMYCIN 250 MG/1
1000 TABLET, FILM COATED ORAL ONCE
Status: COMPLETED | OUTPATIENT
Start: 2020-01-22 | End: 2020-01-22

## 2020-01-22 RX ADMIN — AZITHROMYCIN MONOHYDRATE 1000 MG: 250 TABLET ORAL at 14:09

## 2020-01-22 RX ADMIN — METRONIDAZOLE 2000 MG: 500 TABLET, FILM COATED ORAL at 14:08

## 2020-01-22 RX ADMIN — CEFTRIAXONE 250 MG: 1 INJECTION, POWDER, FOR SOLUTION INTRAMUSCULAR; INTRAVENOUS at 14:09

## 2020-01-22 NOTE — ED PROVIDER NOTES
[facility] EMERGENCY DEPT  eMERGENCYdEPARTMENT eNCOUnter      Pt Name: Neva Barajas  MRN: [id number]  [place] 1990  Date of evaluation: 1/22/2020  MOON Steele    CHIEF COMPLAINT       Chief Complaint   Patient presents with    Exposure to STD         HISTORY OF PRESENT ILLNESS  (Location/Symptom, Timing/Onset, Context/Setting, Quality, Duration, Modifying Factors, Severity.)   Neva Barajas is a 34 y.o. female who presents to the emergency department with complaints of exposure to STD. Her partner called her this morning and told her that he had positive results for trichomoniasis and ghonorrhea. The patient is asymptomatic but wants treatment and to be checked herself. She denies any other partners. She is upset. When asked she denies discharge bleeding spotting or lesions/sores. Denies hematuria or dysuria. Denies itching. She had a period 2 weeks ago. Hx of tubal ligation. Denies pregnancy. Last with her partner 1 week ago. HPI    Nursing Notes were reviewed and I agree. REVIEW OF SYSTEMS    (2-9 systems for level 4, 10 or more for level 5)     Review of Systems   Constitutional: Negative for activity change, appetite change, chills and fever. HENT: Negative for congestion, postnasal drip, rhinorrhea and sore throat. Eyes: Negative for photophobia, pain, discharge and visual disturbance. Respiratory: Negative for apnea, cough and shortness of breath. Cardiovascular: Negative for chest pain and leg swelling. Gastrointestinal: Negative for abdominal distention, abdominal pain and nausea. Genitourinary: Positive for vaginal discharge. Negative for vaginal bleeding. Musculoskeletal: Negative for arthralgias, back pain, joint swelling, neck pain and neck stiffness. Skin: Negative for color change and rash. Neurological: Negative for dizziness, syncope, facial asymmetry and headaches. Hematological: Negative for adenopathy. Does not bruise/bleed easily. Psychiatric/Behavioral: Negative for agitation, behavioral problems and confusion. Except as noted above the remainder of the review of systems was reviewed and negative. PAST MEDICAL HISTORY     Past Medical History:   Diagnosis Date    Anxiety     Depression, acute 2019         SURGICAL HISTORY       Past Surgical History:   Procedure Laterality Date    APPENDECTOMY       SECTION      TUBAL LIGATION           CURRENT MEDICATIONS       Discharge Medication List as of 2020  2:04 PM      CONTINUE these medications which have NOT CHANGED    Details   FLUoxetine (PROZAC) 20 MG capsule Take 1 capsule by mouth daily, Disp-30 capsule, R-0Normal      lamoTRIgine (LAMICTAL) 25 MG tablet Take 2 tablets by mouth daily, Disp-60 tablet, R-0Normal      ergocalciferol (ERGOCALCIFEROL) 15114 units capsule Take 1 capsule by mouth once a week for 12 doses, Disp-12 capsule, R-0Normal      traZODone (DESYREL) 50 MG tablet Take 1 tablet by mouth nightly as needed for Sleep, Disp-30 tablet, R-0Normal      nicotine (NICODERM CQ) 21 MG/24HR Place 1 patch onto the skin daily, Disp-30 patch, R-0Normal             ALLERGIES     Penicillins    FAMILY HISTORY     History reviewed. No pertinent family history.        SOCIAL HISTORY       Social History     Socioeconomic History    Marital status: Single     Spouse name: None    Number of children: None    Years of education: None    Highest education level: None   Occupational History    None   Social Needs    Financial resource strain: None    Food insecurity:     Worry: None     Inability: None    Transportation needs:     Medical: None     Non-medical: None   Tobacco Use    Smoking status: Current Every Day Smoker     Packs/day: 0.25    Smokeless tobacco: Never Used   Substance and Sexual Activity    Alcohol use: No     Alcohol/week: 0.0 standard drinks    Drug use: No    Sexual activity: None   Lifestyle    Physical activity:     Days per There is no distension. Palpations: Abdomen is soft. Tenderness: There is no tenderness. Genitourinary:     General: Normal vulva. Vagina: Vaginal discharge present. Musculoskeletal: Normal range of motion. Skin:     General: Skin is warm and dry. Capillary Refill: Capillary refill takes less than 2 seconds. Findings: No rash. Neurological:      General: No focal deficit present. Mental Status: She is alert and oriented to person, place, and time. Mental status is at baseline. Cranial Nerves: No cranial nerve deficit. Sensory: No sensory deficit. Coordination: Coordination normal.   Psychiatric:         Mood and Affect: Mood normal.         Behavior: Behavior normal.         Thought Content: Thought content normal.         Judgment: Judgment normal.           DIAGNOSTIC RESULTS     RADIOLOGY:   Non-plain film images such as CT, Ultrasound and MRI are read by the radiologist. Plain radiographic images are visualized and preliminarilyinterpreted by No att. providers found with the below findings:      Interpretation per the Radiologist below, if available at the time of this note:    No orders to display       LABS:  Labs Reviewed   URINE RT REFLEX TO CULTURE - Abnormal; Notable for the following components:       Result Value    Leukocyte Esterase, Urine SMALL (*)     All other components within normal limits   MICROSCOPIC URINALYSIS - Abnormal; Notable for the following components:    WBC, UA 7 (*)     All other components within normal limits   C.TRACHOMATIS N.GONORRHOEAE DNA   URINE CULTURE    Narrative:     ORDER#: [id number]                          ORDERED BY: AVELINO DIAZ  SOURCE: Urine Clean Catch                  COLLECTED:  01/22/20 12:45  ANTIBIOTICS AT [facility].:                      RECEIVED :  01/22/20 13:10   WET PREP, GENITAL   PREGNANCY, URINE       All other labs were within normal range or notreturned as of this dictation.     RE-ASSESSMENT

## 2020-01-23 ASSESSMENT — ENCOUNTER SYMPTOMS
COLOR CHANGE: 0
BACK PAIN: 0
COUGH: 0
ABDOMINAL PAIN: 0
APNEA: 0
ABDOMINAL DISTENTION: 0
RHINORRHEA: 0
EYE PAIN: 0
PHOTOPHOBIA: 0
NAUSEA: 0
EYE DISCHARGE: 0
SORE THROAT: 0
SHORTNESS OF BREATH: 0

## 2020-01-24 LAB — URINE CULTURE, ROUTINE: NORMAL

## 2020-01-25 LAB
APTIMA MEDIA TYPE: ABNORMAL
CHLAMYDIA TRACHOMATIS AMPLIFIED DET: POSITIVE
N GONORRHOEAE AMPLIFIED DET: POSITIVE
SPECIMEN SOURCE: ABNORMAL

## 2020-08-18 ENCOUNTER — HOSPITAL ENCOUNTER (EMERGENCY)
Age: 30
Discharge: HOME | End: 2020-08-19
Payer: MEDICAID

## 2020-08-18 LAB
BILIRUBIN URINE: NEGATIVE
BLOOD, URINE: NEGATIVE
CLARITY: CLEAR
COLOR: YELLOW
GLUCOSE URINE: NEGATIVE MG/DL
HCG(URINE) PREGNANCY TEST: NEGATIVE
KETONES, URINE: NEGATIVE MG/DL
LEUKOCYTE ESTERASE, URINE: NEGATIVE
NITRITE, URINE: NEGATIVE
PH UA: 7 (ref 5–8)
PROTEIN UA: NEGATIVE MG/DL
SPECIFIC GRAVITY UA: 1.02 (ref 1–1.03)
UROBILINOGEN, URINE: 1 E.U./DL

## 2020-08-18 PROCEDURE — 96374 THER/PROPH/DIAG INJ IV PUSH: CPT

## 2020-08-18 PROCEDURE — 87591 N.GONORRHOEAE DNA AMP PROB: CPT

## 2020-08-18 PROCEDURE — 87491 CHLMYD TRACH DNA AMP PROBE: CPT

## 2020-08-18 PROCEDURE — 81003 URINALYSIS AUTO W/O SCOPE: CPT

## 2020-08-18 PROCEDURE — 87077 CULTURE AEROBIC IDENTIFY: CPT

## 2020-08-18 PROCEDURE — 99284 EMERGENCY DEPT VISIT MOD MDM: CPT

## 2020-08-18 PROCEDURE — 99285 EMERGENCY DEPT VISIT HI MDM: CPT

## 2020-08-18 PROCEDURE — 87205 SMEAR GRAM STAIN: CPT

## 2020-08-18 PROCEDURE — 87070 CULTURE OTHR SPECIMN AEROBIC: CPT

## 2020-08-18 PROCEDURE — 84703 CHORIONIC GONADOTROPIN ASSAY: CPT

## 2020-08-18 PROCEDURE — 96372 THER/PROPH/DIAG INJ SC/IM: CPT

## 2020-08-18 ASSESSMENT — ENCOUNTER SYMPTOMS
ABDOMINAL PAIN: 0
VOMITING: 0

## 2020-08-19 VITALS
BODY MASS INDEX: 34.37 KG/M2 | DIASTOLIC BLOOD PRESSURE: 87 MMHG | HEART RATE: 80 BPM | RESPIRATION RATE: 16 BRPM | SYSTOLIC BLOOD PRESSURE: 137 MMHG | HEIGHT: 67 IN | TEMPERATURE: 97.8 F | OXYGEN SATURATION: 98 % | WEIGHT: 219 LBS

## 2020-08-19 LAB
BACTERIA WET PREP: ABNORMAL
CLUE CELLS: ABNORMAL
EPITHELIAL CELLS WET PREP: ABNORMAL
RBC WET PREP: ABNORMAL
SOURCE WET PREP: ABNORMAL
TRICHOMONAS PREP: ABNORMAL
WBC WET PREP: ABNORMAL
YEAST WET PREP: ABNORMAL

## 2020-08-19 PROCEDURE — 6370000000 HC RX 637 (ALT 250 FOR IP): Performed by: NURSE PRACTITIONER

## 2020-08-19 PROCEDURE — 2500000003 HC RX 250 WO HCPCS: Performed by: NURSE PRACTITIONER

## 2020-08-19 PROCEDURE — 6360000002 HC RX W HCPCS: Performed by: NURSE PRACTITIONER

## 2020-08-19 RX ORDER — LIDOCAINE HYDROCHLORIDE 10 MG/ML
5 INJECTION, SOLUTION EPIDURAL; INFILTRATION; INTRACAUDAL; PERINEURAL ONCE
Status: COMPLETED | OUTPATIENT
Start: 2020-08-19 | End: 2020-08-19

## 2020-08-19 RX ORDER — METRONIDAZOLE 500 MG/1
500 TABLET ORAL 2 TIMES DAILY
Qty: 14 TABLET | Refills: 0 | Status: SHIPPED | OUTPATIENT
Start: 2020-08-19 | End: 2020-08-26

## 2020-08-19 RX ORDER — AZITHROMYCIN 250 MG/1
1000 TABLET, FILM COATED ORAL ONCE
Status: COMPLETED | OUTPATIENT
Start: 2020-08-19 | End: 2020-08-19

## 2020-08-19 RX ORDER — METRONIDAZOLE 500 MG/1
2000 TABLET ORAL ONCE
Status: COMPLETED | OUTPATIENT
Start: 2020-08-19 | End: 2020-08-19

## 2020-08-19 RX ORDER — DIAPER,BRIEF,INFANT-TODD,DISP
EACH MISCELLANEOUS
Qty: 1 TUBE | Refills: 0 | Status: SHIPPED | OUTPATIENT
Start: 2020-08-19 | End: 2020-08-26

## 2020-08-19 RX ORDER — CEFTRIAXONE 1 G/1
500 INJECTION, POWDER, FOR SOLUTION INTRAMUSCULAR; INTRAVENOUS ONCE
Status: COMPLETED | OUTPATIENT
Start: 2020-08-19 | End: 2020-08-19

## 2020-08-19 RX ADMIN — CEFTRIAXONE 500 MG: 1 INJECTION, POWDER, FOR SOLUTION INTRAMUSCULAR; INTRAVENOUS at 00:38

## 2020-08-19 RX ADMIN — AZITHROMYCIN DIHYDRATE 1000 MG: 250 TABLET, FILM COATED ORAL at 00:34

## 2020-08-19 RX ADMIN — METRONIDAZOLE 2000 MG: 500 TABLET, FILM COATED ORAL at 00:34

## 2020-08-19 RX ADMIN — LIDOCAINE HYDROCHLORIDE 5 ML: 10 INJECTION, SOLUTION EPIDURAL; INFILTRATION; INTRACAUDAL; PERINEURAL at 00:39

## 2020-08-19 NOTE — ED PROVIDER NOTES
[facility]  eMERGENCY dEPARTMENT eNCOUnter      Pt Name: Jessy Patel  MRN: [id number]  [place] 1990  Date of evaluation: 2020  Provider: Trisha Couch, [address]       Chief Complaint   Patient presents with    Vaginal Discharge     burning         HISTORY OF PRESENT ILLNESS   (Location/Symptom, Timing/Onset,Context/Setting, Quality, Duration, Modifying Factors, Severity)  Note limiting factors. Jessy Patel is a 34 y.o. female who presents to the emergency department with a vaginal discharge is gone on for a week. She also has some burning. She has had a tubal and does not believe she is pregnant. She could have an STD. She has not been on antibiotics recently she has not had a fever or abdominal pain. Patient also has a rash to her arms that she has had for a year that she is concerned about. The history is provided by the patient. Vaginal Discharge   Quality:  Malodorous  Severity:  Moderate  Onset quality:  Sudden  Duration:  1 week  Timing:  Constant  Progression:  Unchanged  Chronicity:  New  Context: not recent antibiotic use    Associated symptoms: no abdominal pain, no dysuria, no fever, no genital lesions, no rash and no vomiting    Risk factors: unprotected sex        NursingNotes were reviewed. REVIEW OF SYSTEMS    (2-9 systems for level 4, 10 or more for level 5)     Review of Systems   Constitutional: Negative for fever. Gastrointestinal: Negative for abdominal pain and vomiting. Genitourinary: Positive for vaginal discharge. Negative for dysuria. Skin: Positive for rash. Except as noted above the remainder of the review of systems was reviewed and negative.        PAST MEDICAL HISTORY     Past Medical History:   Diagnosis Date    Anxiety     Depression, acute 2019    Hypertension          SURGICALHISTORY       Past Surgical History:   Procedure Laterality Date    APPENDECTOMY       SECTION      TUBAL LIGATION CURRENT MEDICATIONS       Discharge Medication List as of 8/19/2020 12:53 AM      CONTINUE these medications which have NOT CHANGED    Details   FLUoxetine (PROZAC) 20 MG capsule Take 1 capsule by mouth daily, Disp-30 capsule, R-0Normal      lamoTRIgine (LAMICTAL) 25 MG tablet Take 2 tablets by mouth daily, Disp-60 tablet, R-0Normal      ergocalciferol (ERGOCALCIFEROL) 23914 units capsule Take 1 capsule by mouth once a week for 12 doses, Disp-12 capsule, R-0Normal      traZODone (DESYREL) 50 MG tablet Take 1 tablet by mouth nightly as needed for Sleep, Disp-30 tablet, R-0Normal             ALLERGIES     Penicillins    FAMILY HISTORY     History reviewed. No pertinent family history.        SOCIAL HISTORY       Social History     Socioeconomic History    Marital status: Single     Spouse name: None    Number of children: None    Years of education: None    Highest education level: None   Occupational History    None   Social Needs    Financial resource strain: None    Food insecurity     Worry: None     Inability: None    Transportation needs     Medical: None     Non-medical: None   Tobacco Use    Smoking status: Current Every Day Smoker     Packs/day: 0.25    Smokeless tobacco: Never Used   Substance and Sexual Activity    Alcohol use: No     Alcohol/week: 0.0 standard drinks    Drug use: No    Sexual activity: None   Lifestyle    Physical activity     Days per week: None     Minutes per session: None    Stress: None   Relationships    Social connections     Talks on phone: None     Gets together: None     Attends Yarsanism service: None     Active member of club or organization: None     Attends meetings of clubs or organizations: None     Relationship status: None    Intimate partner violence     Fear of current or ex partner: None     Emotionally abused: None     Physically abused: None     Forced sexual activity: None   Other Topics Concern    None   Social History Narrative    None SCREENINGS    Jermaine Coma Scale  Eye Opening: Spontaneous  Best Verbal Response: Oriented  Best Motor Response: Obeys commands  Jermaine Coma Scale Score: 15 @FLOW([id number])@      PHYSICAL EXAM    (up to 7 for level 4, 8 or more for level 5)     ED Triage Vitals [08/18/20 2147]   BP Temp Temp Source Pulse Resp SpO2 Height Weight   137/87 97.8 °F (36.6 °C) Infrared 88 16 98 % 5' 7\" (1.702 m) 219 lb (99.3 kg)       Physical Exam  Vitals signs and nursing note reviewed. Exam conducted with a chaperone present. Constitutional:       Appearance: She is well-developed. HENT:      Head: Normocephalic and atraumatic. Eyes:      General: No scleral icterus. Right eye: No discharge. Left eye: No discharge. Neck:      Musculoskeletal: Normal range of motion and neck supple. Cardiovascular:      Rate and Rhythm: Normal rate. Pulmonary:      Effort: No respiratory distress. Genitourinary:     Vagina: No signs of injury. Vaginal discharge present. No tenderness or lesions. Cervix: Normal.   Neurological:      General: No focal deficit present. Mental Status: She is alert and oriented to person, place, and time.    Psychiatric:         Behavior: Behavior normal.         DIAGNOSTIC RESULTS     EKG: All EKG's are interpreted by the Emergency Department Physician who either signs or Co-signsthis chart in the absence of a cardiologist.        RADIOLOGY:   Sanna Heir such as CT, Ultrasound and MRI are read by the radiologist. Plain radiographic images are visualized and preliminarily interpreted by the emergency physician with the below findings:      Interpretation per the Radiologist below, if available at the time of this note:    No orders to display         ED BEDSIDEULTRASOUND:   Performed by ED Physician -none    LABS:  Labs Reviewed   WET PREP, GENITAL - Abnormal; Notable for the following components:       Result Value    Clue Cells, Wet Prep 2+ (*)     All other components within normal limits   C.TRACHOMATIS N.GONORRHOEAE DNA   CULTURE, GENITAL    Narrative:     ORDER#: [id number]                          ORDERED BY: ZOE OVIEDO  SOURCE: Vagina vaginal                     COLLECTED:  08/18/20 22:56  ANTIBIOTICS AT [facility].:                      RECEIVED :  08/18/20 23:04   PREGNANCY, URINE   URINE RT REFLEX TO CULTURE       All other labs were within normal range or not returned as of this dictation. EMERGENCY DEPARTMENT COURSE and DIFFERENTIALDIAGNOSIS/MDM:   Vitals:    Vitals:    08/18/20 2147 08/18/20 2300 08/19/20 0120   BP: 137/87  137/87   Pulse: 88 80 80   Resp: 16  16   Temp: 97.8 °F (36.6 °C)  97.8 °F (36.6 °C)   TempSrc: Infrared     SpO2: 98%     Weight: 219 lb (99.3 kg)     Height: 5' 7\" (1.702 m)             MDM      CONSULTS:  None    PROCEDURES:  Unless otherwise noted below, none     Procedures    FINAL IMPRESSION      1. Possible exposure to STD    2. Vaginal discharge    3. Rash and other nonspecific skin eruption    4. Bacterial vaginosis        DISPOSITION/PLAN   DISPOSITION Decision To Discharge 08/19/2020 12:51:35 AM      PATIENT REFERRED TO:  Audie Fisher Case  [address] [address] [phone]            DISCHARGE MEDICATIONS:  Discharge Medication List as of 8/19/2020 12:53 AM      START taking these medications    Details   hydrocortisone (ALA-ERLIN) 1 % cream Apply topically 2 times daily. , Disp-1 Tube,R-0, Print                (Please note that portions of this note were completed with a voice recognitionprogram.  Efforts were made to edit the dictations but occasionally words are mis-transcribed.)    GISELLE Gavin (electronically signed)          GISELLE Gavin  08/19/20 1490

## 2020-08-21 LAB
CHLAMYDIA TRACHOMATIS AMPLIFIED DET: NEGATIVE
N GONORRHOEAE AMPLIFIED DET: POSITIVE
SPECIMEN SOURCE: ABNORMAL

## 2020-08-24 LAB
GENITAL CULTURE, ROUTINE: ABNORMAL
GENITAL CULTURE, ROUTINE: ABNORMAL
GRAM STAIN RESULT: ABNORMAL
ORGANISM: ABNORMAL

## 2021-01-17 ENCOUNTER — OFFICE VISIT (OUTPATIENT)
Age: 31
End: 2021-01-17

## 2021-01-17 ENCOUNTER — OFFICE VISIT (OUTPATIENT)
Dept: URGENT CARE | Age: 31
End: 2021-01-17
Payer: MEDICAID

## 2021-01-17 VITALS
HEART RATE: 88 BPM | RESPIRATION RATE: 20 BRPM | SYSTOLIC BLOOD PRESSURE: 135 MMHG | OXYGEN SATURATION: 99 % | TEMPERATURE: 98 F | WEIGHT: 233 LBS | DIASTOLIC BLOOD PRESSURE: 78 MMHG | BODY MASS INDEX: 36.49 KG/M2

## 2021-01-17 DIAGNOSIS — B34.9 VIRAL ILLNESS: ICD-10-CM

## 2021-01-17 DIAGNOSIS — Z11.59 SCREENING FOR VIRAL DISEASE: Primary | ICD-10-CM

## 2021-01-17 PROCEDURE — 99203 OFFICE O/P NEW LOW 30 MIN: CPT | Performed by: NURSE PRACTITIONER

## 2021-01-17 ASSESSMENT — ENCOUNTER SYMPTOMS
CHEST TIGHTNESS: 0
CONSTIPATION: 0
DIARRHEA: 1
NAUSEA: 0
VOMITING: 0
SHORTNESS OF BREATH: 0
EYES NEGATIVE: 1
WHEEZING: 0
SORE THROAT: 1

## 2021-01-17 NOTE — PROGRESS NOTES
[facility]  [address] [place] [address] [address] [address]  Dept: [phone]  Dept Fax: [phone]  Loc: [phone]    Sarah Kurtz is a 31 y.o. female who presents today for her medical conditions/complaintsas noted below. Sarah Kurtz is c/o of Fever (positive exposure), Chills, Muscle Pain, Diarrhea, Pharyngitis, and Shortness of Breath        HPI:     Diarrhea   This is a new problem. The current episode started yesterday. The problem occurs 5 to 10 times per day. The problem has been unchanged. The stool consistency is described as watery. Associated symptoms include chills, myalgias and a URI. Pertinent negatives include no fever or vomiting. Nothing aggravates the symptoms. Risk factors include ill contacts (+covid exposure). She has tried increased fluids and electrolyte solution for the symptoms. The treatment provided mild relief. Generalized Body Aches  This is a new problem. The current episode started yesterday. The problem occurs daily. The problem has been unchanged. Associated symptoms include chills, fatigue, myalgias and a sore throat. Pertinent negatives include no chest pain, congestion, fever, nausea, numbness, vomiting or weakness. Nothing aggravates the symptoms. Treatments tried: nyquill. The treatment provided mild relief. Past Medical History:   Diagnosis Date    Anxiety     Depression, acute 2019    Hypertension      Past Surgical History:   Procedure Laterality Date    APPENDECTOMY       SECTION      TUBAL LIGATION         No family history on file.     Social History     Tobacco Use    Smoking status: Current Every Day Smoker     Packs/day: 0.25    Smokeless tobacco: Never Used   Substance Use Topics    Alcohol use: No     Alcohol/week: 0.0 standard drinks      Current Outpatient Medications   Medication Sig Dispense Refill    FLUoxetine (PROZAC) 20 MG capsule Take 1 capsule by mouth daily 30 capsule 0 Right Ear: Tympanic membrane, ear canal and external ear normal.      Left Ear: Tympanic membrane, ear canal and external ear normal.      Nose: Nose normal.      Mouth/Throat:      Mouth: Mucous membranes are moist.      Pharynx: Posterior oropharyngeal erythema present. Eyes:      Extraocular Movements: Extraocular movements intact. Conjunctiva/sclera: Conjunctivae normal.      Pupils: Pupils are equal, round, and reactive to light. Cardiovascular:      Rate and Rhythm: Normal rate and regular rhythm. Heart sounds: Normal heart sounds. No murmur. Pulmonary:      Effort: Pulmonary effort is normal. No respiratory distress. Breath sounds: Normal breath sounds. Musculoskeletal:         General: No swelling or signs of injury. Lymphadenopathy:      Cervical: No cervical adenopathy. Skin:     General: Skin is warm and dry. Findings: No rash. Neurological:      General: No focal deficit present. Mental Status: She is alert and oriented to person, place, and time. Motor: No weakness. Psychiatric:         Mood and Affect: Mood normal.       /78   Pulse 88   Temp 98 °F (36.7 °C)   Resp 20   Wt 233 lb (105.7 kg)   SpO2 99%   BMI 36.49 kg/m²     Assessment:       Diagnosis Orders   1. Screening for viral disease     2. Viral illness         Plan:    No orders of the defined types were placed in this encounter. covid test today    No follow-ups on file. No orders of the defined types were placed in this encounter. Patient given educational materials- see patient instructions. Discussed use, benefit, and side effects of prescribedmedications. All patient questions answered. Pt voiced understanding. Reviewedhealth maintenance. Instructed to continue current medications, diet and exercise. Patient agreed with treatment plan. Follow up as directed.      Patient Instructions You have been tested for coronavirus using a nasopharyngeal swab. You are to quarantine until your test results are back. This typically takes 2-3 days and we will call you with results. 1. Rest and increase water intake. 2. Monitor for fever and treat as needed with over the counter Tylenol/Ibuprofen per package instructions. 3. Treat symptoms such as cough/congestion with over the counter medications. 4. Go to ED if symptoms worsen or if you experience chest pain, shortness of breath, or a fever that is uncontrolled with medication. Patient Education        Learning About Coronavirus [phone]  What is coronavirus (COVID-19)? COVID-19 is a disease caused by a new type of coronavirus. This illness was first found in December 2019. It has since spread worldwide. Coronaviruses are a large group of viruses. They cause the common cold. They also cause more serious illnesses like Middle East respiratory syndrome (MERS) and severe acute respiratory syndrome (SARS). COVID-19 is caused by a novel coronavirus. That means it's a new type that has not been seen in people before. What are the symptoms? Coronavirus (COVID-19) symptoms may include:  · Fever. · Cough. · Trouble breathing. · Chills or repeated shaking with chills. · Muscle pain. · Headache. · Sore throat. · New loss of taste or smell. · Vomiting. · Diarrhea. In severe cases, COVID-19 can cause pneumonia and make it hard to breathe without help from a machine. It can cause death. How is it diagnosed? COVID-19 is diagnosed with a viral test. This may also be called a PCR test or antigen test. It looks for evidence of the virus in your breathing passages or lungs (respiratory system). The test is most often done on a sample from the nose, throat, or lungs. It's sometimes done on a sample of saliva. One way a sample is collected is by putting a long swab into the back of your nose. How is it treated? Mild cases of COVID-19 can be treated at home. Serious cases need treatment in the hospital. Treatment may include medicines to reduce symptoms, plus breathing support such as oxygen therapy or a ventilator. Some people may be placed on their belly to help their oxygen levels. Treatments that may help people who have COVID-19 include:  Antiviral medicines. These medicines treat viral infections. Remdesivir is an example. Immune-based therapy. These medicines help the immune system fight COVID-19. One example is bamlanivimab. It's a monoclonal antibody. Blood thinners. These medicines help prevent blood clots. People with severe illness are at risk for blood clots. How can you protect yourself and others? The best way to protect yourself from getting sick is to:  · Avoid areas where there is an outbreak. · Avoid contact with people who may be infected. · Avoid crowds and try to stay at least 6 feet away from other people. · Wash your hands often, especially after you cough or sneeze. Use soap and water, and scrub for at least 20 seconds. If soap and water aren't available, use an alcohol-based hand . · Avoid touching your mouth, nose, and eyes. To help avoid spreading the virus to others:  · Stay home if you are sick or have been exposed to the virus. Don't go to school, work, or public areas. And don't use public transportation, ride-shares, or taxis unless you have no choice. · Wear a cloth face cover if you have to go to public areas. · Cover your mouth with a tissue when you cough or sneeze. Then throw the tissue in the trash and wash your hands right away. · If you're sick:  ? Leave your home only if you need to get medical care. But call the doctor's office first so they know you're coming. And wear a face cover. ? Wear the face cover whenever you're around other people. It can help stop the spread of the virus when you cough or sneeze. ? Limit contact with pets and people in your home. If possible, stay in a separate bedroom and use a separate bathroom. ? Clean and disinfect your home every day. Use household  and disinfectant wipes or sprays. Take special care to clean things that you grab with your hands. These include doorknobs, remote controls, phones, and handles on your refrigerator and microwave. And don't forget countertops, tabletops, bathrooms, and computer keyboards. When should you call for help? Call 911 anytime you think you may need emergency care. For example, call if you have life-threatening symptoms, such as:    · You have severe trouble breathing. (You can't talk at all.)     · You have constant chest pain or pressure.     · You are severely dizzy or lightheaded.     · You are confused or can't think clearly.     · Your face and lips have a blue color.     · You pass out (lose consciousness) or are very hard to wake up. Call your doctor now or seek immediate medical care if:    · You have moderate trouble breathing. (You can't speak a full sentence.)     · You are coughing up blood (more than about 1 teaspoon).     · You have signs of low blood pressure. These include feeling lightheaded; being too weak to stand; and having cold, pale, clammy skin. Watch closely for changes in your health, and be sure to contact your doctor if:    · Your symptoms get worse.     · You are not getting better as expected. Call before you go to the doctor's office. Follow their instructions. And wear a cloth face cover. Current as of: December 18, 2020               Content Version: 12.7  © 2006-2021 [employer]. Care instructions adapted under license by [facility] ([facility]). If you have questions about a medical condition or this instruction, always ask your healthcare professional. [place] [address] any warranty or liability for your use of this information.        Patient Education Diarrhea: Care Instructions  Your Care Instructions     Diarrhea is loose, watery stools (bowel movements). The exact cause is often hard to find. Sometimes diarrhea is your body's way of getting rid of what caused an upset stomach. Viruses, food poisoning, and many medicines can cause diarrhea. Some people get diarrhea in response to emotional stress, anxiety, or certain foods. Almost everyone has diarrhea now and then. It usually isn't serious, and your stools will return to normal soon. The important thing to do is replace the fluids you have lost, so you can prevent dehydration. The doctor has checked you carefully, but problems can develop later. If you notice any problems or new symptoms, get medical treatment right away. Follow-up care is a key part of your treatment and safety. Be sure to make and go to all appointments, and call your doctor if you are having problems. It's also a good idea to know your test results and keep a list of the medicines you take. How can you care for yourself at home? · Watch for signs of dehydration, which means your body has lost too much water. Dehydration is a serious condition and should be treated right away. Signs of dehydration are:  ? Increasing thirst and dry eyes and mouth. ? Feeling faint or lightheaded. ? A smaller amount of urine than normal.  · To prevent dehydration, drink plenty of fluids. Choose water and other caffeine-free clear liquids until you feel better. If you have kidney, heart, or liver disease and have to limit fluids, talk with your doctor before you increase the amount of fluids you drink. · Begin eating small amounts of mild foods the next day, if you feel like it. ? Try yogurt that has live cultures of Lactobacillus. (Check the label.)  ? Avoid spicy foods, fruits, alcohol, and caffeine until 48 hours after all symptoms are gone. ? Avoid chewing gum that contains sorbitol. ? Avoid dairy products (except for yogurt with Lactobacillus) while you have diarrhea and for 3 days after symptoms are gone. · The doctor may recommend that you take over-the-counter medicine, such as loperamide (Imodium), if you still have diarrhea after 6 hours. Read and follow all instructions on the label. Do not use this medicine if you have bloody diarrhea, a high fever, or other signs of serious illness. Call your doctor if you think you are having a problem with your medicine. When should you call for help? Call 911 anytime you think you may need emergency care. For example, call if:    · You passed out (lost consciousness).     · Your stools are maroon or very bloody. Call your doctor now or seek immediate medical care if:    · You are dizzy or lightheaded, or you feel like you may faint.     · Your stools are black and look like tar, or they have streaks of blood.     · You have new or worse belly pain.     · You have symptoms of dehydration, such as:  ? Dry eyes and a dry mouth. ? Passing only a little dark urine. ? Feeling thirstier than usual.     · You have a new or higher fever. Watch closely for changes in your health, and be sure to contact your doctor if:    · Your diarrhea is getting worse.     · You see pus in the diarrhea.     · You are not getting better after 2 days (48 hours). Where can you learn more? Go to https://[employer]. org and sign in to your [employer] account. Enter H627 in the [facility] box to learn more about \"Diarrhea: Care Instructions. \"     If you do not have an account, please click on the \"Sign Up Now\" link. Current as of: June 26, 2019               Content Version: 12.6  © 6338-0220 [employer].

## 2021-01-17 NOTE — PATIENT INSTRUCTIONS
You have been tested for coronavirus using a nasopharyngeal swab. You are to quarantine until your test results are back. This typically takes 2-3 days and we will call you with results. 1. Rest and increase water intake. 2. Monitor for fever and treat as needed with over the counter Tylenol/Ibuprofen per package instructions. 3. Treat symptoms such as cough/congestion with over the counter medications. 4. Go to ED if symptoms worsen or if you experience chest pain, shortness of breath, or a fever that is uncontrolled with medication. Patient Education        Learning About Coronavirus [phone])  What is coronavirus (COVID-19)? COVID-19 is a disease caused by a new type of coronavirus. This illness was first found in December 2019. It has since spread worldwide. Coronaviruses are a large group of viruses. They cause the common cold. They also cause more serious illnesses like [place] East respiratory syndrome (MERS) and severe acute respiratory syndrome (SARS). COVID-19 is caused by a novel coronavirus. That means it's a new type that has not been seen in people before. What are the symptoms? Coronavirus (COVID-19) symptoms may include:  · Fever. · Cough. · Trouble breathing. · Chills or repeated shaking with chills. · Muscle pain. · Headache. · Sore throat. · New loss of taste or smell. · Vomiting. · Diarrhea. In severe cases, COVID-19 can cause pneumonia and make it hard to breathe without help from a machine. It can cause death. How is it diagnosed? COVID-19 is diagnosed with a viral test. This may also be called a PCR test or antigen test. It looks for evidence of the virus in your breathing passages or lungs (respiratory system). The test is most often done on a sample from the nose, throat, or lungs. It's sometimes done on a sample of saliva. One way a sample is collected is by putting a long swab into the back of your nose. How is it treated? Mild cases of COVID-19 can be treated at home. Serious cases need treatment in the hospital. Treatment may include medicines to reduce symptoms, plus breathing support such as oxygen therapy or a ventilator. Some people may be placed on their belly to help their oxygen levels. Treatments that may help people who have COVID-19 include:  Antiviral medicines. These medicines treat viral infections. Remdesivir is an example. Immune-based therapy. These medicines help the immune system fight COVID-19. One example is bamlanivimab. It's a monoclonal antibody. Blood thinners. These medicines help prevent blood clots. People with severe illness are at risk for blood clots. How can you protect yourself and others? The best way to protect yourself from getting sick is to:  · Avoid areas where there is an outbreak. · Avoid contact with people who may be infected. · Avoid crowds and try to stay at least 6 feet away from other people. · Wash your hands often, especially after you cough or sneeze. Use soap and water, and scrub for at least 20 seconds. If soap and water aren't available, use an alcohol-based hand . · Avoid touching your mouth, nose, and eyes. To help avoid spreading the virus to others:  · Stay home if you are sick or have been exposed to the virus. Don't go to school, work, or public areas. And don't use public transportation, ride-shares, or taxis unless you have no choice. · Wear a cloth face cover if you have to go to public areas. · Cover your mouth with a tissue when you cough or sneeze. Then throw the tissue in the trash and wash your hands right away. · If you're sick:  ? Leave your home only if you need to get medical care. But call the doctor's office first so they know you're coming. And wear a face cover. ? Wear the face cover whenever you're around other people. It can help stop the spread of the virus when you cough or sneeze. ? Limit contact with pets and people in your home. If possible, stay in a separate bedroom and use a separate bathroom. ? Clean and disinfect your home every day. Use household  and disinfectant wipes or sprays. Take special care to clean things that you grab with your hands. These include doorknobs, remote controls, phones, and handles on your refrigerator and microwave. And don't forget countertops, tabletops, bathrooms, and computer keyboards. When should you call for help? Call 911 anytime you think you may need emergency care. For example, call if you have life-threatening symptoms, such as:    · You have severe trouble breathing. (You can't talk at all.)     · You have constant chest pain or pressure.     · You are severely dizzy or lightheaded.     · You are confused or can't think clearly.     · Your face and lips have a blue color.     · You pass out (lose consciousness) or are very hard to wake up. Call your doctor now or seek immediate medical care if:    · You have moderate trouble breathing. (You can't speak a full sentence.)     · You are coughing up blood (more than about 1 teaspoon).     · You have signs of low blood pressure. These include feeling lightheaded; being too weak to stand; and having cold, pale, clammy skin. Watch closely for changes in your health, and be sure to contact your doctor if:    · Your symptoms get worse.     · You are not getting better as expected. Call before you go to the doctor's office. Follow their instructions. And wear a cloth face cover. Current as of: December 18, 2020               Content Version: 12.7  © 2006-2021 Healthwise, Incorporated. Care instructions adapted under license by [facility] [place] ([facility]). If you have questions about a medical condition or this instruction, always ask your healthcare professional. [place] [address] any warranty or liability for your use of this information.        Patient Education Diarrhea: Care Instructions  Your Care Instructions     Diarrhea is loose, watery stools (bowel movements). The exact cause is often hard to find. Sometimes diarrhea is your body's way of getting rid of what caused an upset stomach. Viruses, food poisoning, and many medicines can cause diarrhea. Some people get diarrhea in response to emotional stress, anxiety, or certain foods. Almost everyone has diarrhea now and then. It usually isn't serious, and your stools will return to normal soon. The important thing to do is replace the fluids you have lost, so you can prevent dehydration. The doctor has checked you carefully, but problems can develop later. If you notice any problems or new symptoms, get medical treatment right away. Follow-up care is a key part of your treatment and safety. Be sure to make and go to all appointments, and call your doctor if you are having problems. It's also a good idea to know your test results and keep a list of the medicines you take. How can you care for yourself at home? · Watch for signs of dehydration, which means your body has lost too much water. Dehydration is a serious condition and should be treated right away. Signs of dehydration are:  ? Increasing thirst and dry eyes and mouth. ? Feeling faint or lightheaded. ? A smaller amount of urine than normal.  · To prevent dehydration, drink plenty of fluids. Choose water and other caffeine-free clear liquids until you feel better. If you have kidney, heart, or liver disease and have to limit fluids, talk with your doctor before you increase the amount of fluids you drink. · Begin eating small amounts of mild foods the next day, if you feel like it. ? Try yogurt that has live cultures of Lactobacillus. (Check the label.)  ? Avoid spicy foods, fruits, alcohol, and caffeine until 48 hours after all symptoms are gone. ? Avoid chewing gum that contains sorbitol. ? Avoid dairy products (except for yogurt with Lactobacillus) while you have diarrhea and for 3 days after symptoms are gone. · The doctor may recommend that you take over-the-counter medicine, such as loperamide (Imodium), if you still have diarrhea after 6 hours. Read and follow all instructions on the label. Do not use this medicine if you have bloody diarrhea, a high fever, or other signs of serious illness. Call your doctor if you think you are having a problem with your medicine. When should you call for help? Call 911 anytime you think you may need emergency care. For example, call if:    · You passed out (lost consciousness).     · Your stools are maroon or very bloody. Call your doctor now or seek immediate medical care if:    · You are dizzy or lightheaded, or you feel like you may faint.     · Your stools are black and look like tar, or they have streaks of blood.     · You have new or worse belly pain.     · You have symptoms of dehydration, such as:  ? Dry eyes and a dry mouth. ? Passing only a little dark urine. ? Feeling thirstier than usual.     · You have a new or higher fever. Watch closely for changes in your health, and be sure to contact your doctor if:    · Your diarrhea is getting worse.     · You see pus in the diarrhea.     · You are not getting better after 2 days (48 hours). Where can you learn more? Go to https://NuFlickpepiceSentireeb.OKWave. org and sign in to your [employer] account. Enter Y302 in the [facility] box to learn more about \"Diarrhea: Care Instructions. \"     If you do not have an account, please click on the \"Sign Up Now\" link. Current as of: June 26, 2019               Content Version: 12.6  © 8468-3159 [employer], Incorporated. Care instructions adapted under license by [facility] ([facility]). If you have questions about a medical condition or this instruction, always ask your healthcare professional. [address] any warranty or liability for your use of this information.

## 2021-01-19 LAB — SARS-COV-2, NAA: NOT DETECTED

## 2021-04-06 ENCOUNTER — OFFICE VISIT (OUTPATIENT)
Age: 31
End: 2021-04-06

## 2021-04-06 ENCOUNTER — OFFICE VISIT (OUTPATIENT)
Dept: URGENT CARE | Age: 31
End: 2021-04-06
Payer: MEDICAID

## 2021-04-06 VITALS
RESPIRATION RATE: 22 BRPM | SYSTOLIC BLOOD PRESSURE: 137 MMHG | TEMPERATURE: 98.3 F | HEIGHT: 67 IN | DIASTOLIC BLOOD PRESSURE: 88 MMHG | WEIGHT: 239.6 LBS | HEART RATE: 78 BPM | BODY MASS INDEX: 37.61 KG/M2 | OXYGEN SATURATION: 100 %

## 2021-04-06 DIAGNOSIS — Z11.59 SCREENING FOR VIRAL DISEASE: Primary | ICD-10-CM

## 2021-04-06 DIAGNOSIS — R05.9 COUGH: ICD-10-CM

## 2021-04-06 DIAGNOSIS — Z11.59 SCREENING FOR VIRAL DISEASE: ICD-10-CM

## 2021-04-06 DIAGNOSIS — R52 BODY ACHES: ICD-10-CM

## 2021-04-06 DIAGNOSIS — J06.9 VIRAL UPPER RESPIRATORY INFECTION: Primary | ICD-10-CM

## 2021-04-06 LAB
INFLUENZA A ANTIBODY: NEGATIVE
INFLUENZA B ANTIBODY: NEGATIVE

## 2021-04-06 PROCEDURE — 87804 INFLUENZA ASSAY W/OPTIC: CPT | Performed by: NURSE PRACTITIONER

## 2021-04-06 PROCEDURE — 99213 OFFICE O/P EST LOW 20 MIN: CPT | Performed by: NURSE PRACTITIONER

## 2021-04-06 RX ORDER — BROMPHENIRAMINE MALEATE, PSEUDOEPHEDRINE HYDROCHLORIDE, AND DEXTROMETHORPHAN HYDROBROMIDE 2; 30; 10 MG/5ML; MG/5ML; MG/5ML
10 SYRUP ORAL NIGHTLY PRN
Qty: 118 ML | Refills: 0 | Status: SHIPPED | OUTPATIENT
Start: 2021-04-06 | End: 2022-05-03

## 2021-04-06 RX ORDER — BENZONATATE 100 MG/1
100 CAPSULE ORAL 2 TIMES DAILY PRN
Qty: 20 CAPSULE | Refills: 0 | Status: SHIPPED | OUTPATIENT
Start: 2021-04-06 | End: 2021-04-13

## 2021-04-06 ASSESSMENT — ENCOUNTER SYMPTOMS
COUGH: 1
CONSTIPATION: 0
VOMITING: 0
EYES NEGATIVE: 1
CHANGE IN BOWEL HABIT: 1
SORE THROAT: 0
DIARRHEA: 0
NAUSEA: 0
SHORTNESS OF BREATH: 0
WHEEZING: 0
CHEST TIGHTNESS: 0

## 2021-04-06 NOTE — PATIENT INSTRUCTIONS
You have been tested for coronavirus using a nasopharyngeal swab. You are to quarantine until your test results are back. This typically takes 2-3 days and we will call you with results. Use Bromfed DM and Tessalon Perles as prescribed. 1. Rest and increase water intake. 2. Monitor for fever and treat as needed with over the counter Tylenol/Ibuprofen per package instructions. 3. Treat symptoms such as cough/congestion with over the counter medications. 4. Go to ED if symptoms worsen or if you experience chest pain, shortness of breath, or a fever that is uncontrolled with medication. Patient Education        Learning About Coronavirus [phone])  What is coronavirus (COVID-19)? COVID-19 is a disease caused by a new type of coronavirus. This illness was first found in December 2019. It has since spread worldwide. Coronaviruses are a large group of viruses. They cause the common cold. They also cause more serious illnesses like [place] respiratory syndrome (MERS) and severe acute respiratory syndrome (SARS). COVID-19 is caused by a novel coronavirus. That means it's a new type that has not been seen in people before. What are the symptoms? Coronavirus (COVID-19) symptoms may include:  · Fever. · Cough. · Trouble breathing. · Chills or repeated shaking with chills. · Muscle pain. · Headache. · Sore throat. · New loss of taste or smell. · Vomiting. · Diarrhea. In severe cases, COVID-19 can cause pneumonia and make it hard to breathe without help from a machine. It can cause death. How is it diagnosed? COVID-19 is diagnosed with a viral test. This may also be called a PCR test or antigen test. It looks for evidence of the virus in your breathing passages or lungs (respiratory system). The test is most often done on a sample from the nose, throat, or lungs. It's sometimes done on a sample of saliva.  One way a sample is collected is by putting a long swab into the back of your nose.  How is it treated? Mild cases of COVID-19 can be treated at home. Serious cases need treatment in the hospital. Treatment may include medicines to reduce symptoms, plus breathing support such as oxygen therapy or a ventilator. Some people may be placed on their belly to help their oxygen levels. Treatments that may help people who have COVID-19 include:  Antiviral medicines. These medicines treat viral infections. Remdesivir is an example. Immune-based therapy. These medicines help the immune system fight COVID-19. One example is bamlanivimab. It's a monoclonal antibody. Blood thinners. These medicines help prevent blood clots. People with severe illness are at risk for blood clots. How can you protect yourself and others? The best way to protect yourself from getting sick is to:  · Avoid areas where there is an outbreak. · Avoid contact with people who may be infected. · Avoid crowds and try to stay at least 6 feet away from other people. · Wash your hands often, especially after you cough or sneeze. Use soap and water, and scrub for at least 20 seconds. If soap and water aren't available, use an alcohol-based hand . · Avoid touching your mouth, nose, and eyes. To help avoid spreading the virus to others:  · Stay home if you are sick or have been exposed to the virus. Don't go to school, work, or public areas. And don't use public transportation, ride-shares, or taxis unless you have no choice. · Wear a cloth face cover if you have to go to public areas. · Cover your mouth with a tissue when you cough or sneeze. Then throw the tissue in the trash and wash your hands right away. · If you're sick:  ? Leave your home only if you need to get medical care. But call the doctor's office first so they know you're coming. And wear a face cover. ? Wear the face cover whenever you're around other people. It can help stop the spread of the virus when you cough or sneeze. ?  Limit contact with pets and people in your home. If possible, stay in a separate bedroom and use a separate bathroom. ? Clean and disinfect your home every day. Use household  and disinfectant wipes or sprays. Take special care to clean things that you grab with your hands. These include doorknobs, remote controls, phones, and handles on your refrigerator and microwave. And don't forget countertops, tabletops, bathrooms, and computer keyboards. When should you call for help? Call 911 anytime you think you may need emergency care. For example, call if you have life-threatening symptoms, such as:    · You have severe trouble breathing. (You can't talk at all.)     · You have constant chest pain or pressure.     · You are severely dizzy or lightheaded.     · You are confused or can't think clearly.     · Your face and lips have a blue color.     · You pass out (lose consciousness) or are very hard to wake up. Call your doctor now or seek immediate medical care if:    · You have moderate trouble breathing. (You can't speak a full sentence.)     · You are coughing up blood (more than about 1 teaspoon).     · You have signs of low blood pressure. These include feeling lightheaded; being too weak to stand; and having cold, pale, clammy skin. Watch closely for changes in your health, and be sure to contact your doctor if:    · Your symptoms get worse.     · You are not getting better as expected. Call before you go to the doctor's office. Follow their instructions. And wear a cloth face cover. Current as of: December 18, 2020               Content Version: 12.8  © 2006-2021 Healthwise, Incorporated. Care instructions adapted under license by [facility] [place] ([facility]). If you have questions about a medical condition or this instruction, always ask your healthcare professional. [place] [address] any warranty or liability for your use of this information.        Patient Education        Cough: Care Instructions Your Care Instructions     A cough is your body's response to something that bothers your throat or airways. Many things can cause a cough. You might cough because of a cold or the flu, bronchitis, or asthma. Smoking, postnasal drip, allergies, and stomach acid that backs up into your throat also can cause coughs. A cough is a symptom, not a disease. Most coughs stop when the cause, such as a cold, goes away. You can take a few steps at home to cough less and feel better. Follow-up care is a key part of your treatment and safety. Be sure to make and go to all appointments, and call your doctor if you are having problems. It's also a good idea to know your test results and keep a list of the medicines you take. How can you care for yourself at home? · Drink lots of water and other fluids. This helps thin the mucus and soothes a dry or sore throat. Honey or lemon juice in hot water or tea may ease a dry cough. · Take cough medicine as directed by your doctor. · Prop up your head on pillows to help you breathe and ease a dry cough. · Try cough drops to soothe a dry or sore throat. Cough drops don't stop a cough. Medicine-flavored cough drops are no better than candy-flavored drops or hard candy. · Do not smoke. Avoid secondhand smoke. If you need help quitting, talk to your doctor about stop-smoking programs and medicines. These can increase your chances of quitting for good. When should you call for help? Call 911 anytime you think you may need emergency care. For example, call if:    · You have severe trouble breathing. Call your doctor now or seek immediate medical care if:    · You cough up blood.     · You have new or worse trouble breathing.     · You have a new or higher fever.     · You have a new rash.    Watch closely for changes in your health, and be sure to contact your doctor if:    · You cough more deeply or more often, especially if you notice more mucus or a change in the color of your mucus.     · You have new symptoms, such as a sore throat, an earache, or sinus pain.     · You do not get better as expected. Where can you learn more? Go to https://Pellet Technology USApeAvuxi.Trunity. org and sign in to your [facility] account. Enter D279 in the [facility] box to learn more about \"Cough: Care Instructions. \"     If you do not have an account, please click on the \"Sign Up Now\" link. Current as of: October 26, 2020               Content Version: 12.8  © 8146-3652 Sharelook. Care instructions adapted under license by [address] If you have questions about a medical condition or this instruction, always ask your healthcare professional. [place] [address] any warranty or liability for your use of this information. Patient Education        Viral Respiratory Infection: Care Instructions  Your Care Instructions     Viruses are very small organisms. They grow in number after they enter your body. There are many types that cause different illnesses, such as colds and the mumps. The symptoms of a viral respiratory infection often start quickly. They include a fever, sore throat, and runny nose. You may also just not feel well. Or you may not want to eat much. Most viral respiratory infections are not serious. They usually get better with time and self-care. Antibiotics are not used to treat a viral infection. That's because antibiotics will not help cure a viral illness. In some cases, antiviral medicine can help your body fight a serious viral infection. Follow-up care is a key part of your treatment and safety. Be sure to make and go to all appointments, and call your doctor if you are having problems. It's also a good idea to know your test results and keep a list of the medicines you take. How can you care for yourself at home? · Rest as much as possible until you feel better. · Be safe with medicines. Take your medicine exactly as prescribed. Call your doctor if you think you are having a problem with your medicine. You will get more details on the specific medicine your doctor prescribes. · Take an over-the-counter pain medicine, such as acetaminophen (Tylenol), ibuprofen (Advil, Motrin), or naproxen (Aleve), as needed for pain and fever. Read and follow all instructions on the label. Do not give aspirin to anyone younger than 20. It has been linked to Reye syndrome, a serious illness. · Drink plenty of fluids. Hot fluids, such as tea or soup, may help relieve congestion in your nose and throat. If you have kidney, heart, or liver disease and have to limit fluids, talk with your doctor before you increase the amount of fluids you drink. · Try to clear mucus from your lungs by breathing deeply and coughing. · Gargle with warm salt water once an hour. This can help reduce swelling and throat pain. Use 1 teaspoon of salt mixed in 1 cup of warm water. · Do not smoke or allow others to smoke around you. If you need help quitting, talk to your doctor about stop-smoking programs and medicines. These can increase your chances of quitting for good. To avoid spreading the virus  · Cough or sneeze into a tissue. Then throw the tissue away. · If you don't have a tissue, use your hand to cover your cough or sneeze. Then clean your hand. You can also cough into your sleeve. · Wash your hands often. Use soap and warm water. Wash for 15 to 20 seconds each time. · If you don't have soap and water near you, you can clean your hands with alcohol wipes or gel. When should you call for help? Call your doctor now or seek immediate medical care if:    · You have a new or higher fever.     · Your fever lasts more than 48 hours.     · You have trouble breathing.     · You have a fever with a stiff neck or a severe headache.     · You are sensitive to light.     · You feel very sleepy or confused.    Watch closely for changes in your health, and be sure to contact your doctor if:    · You do not get better as expected. Where can you learn more? Go to https://chpepiceweb.healthCitySwag. org and sign in to your [employer] account. Enter I529 in the [facility] box to learn more about \"Viral Respiratory Infection: Care Instructions. \"     If you do not have an account, please click on the \"Sign Up Now\" link. Current as of: October 26, 2020               Content Version: 12.8  © 2006-2021 Healthwise, Incorporated. Care instructions adapted under license by [facility] ([facility]). If you have questions about a medical condition or this instruction, always ask your healthcare professional. [address] any warranty or liability for your use of this information.

## 2021-04-06 NOTE — PROGRESS NOTES
[facility]  [address] [place] [address] [address] [id number]  Dept: [phone]  Dept Fax: [phone]  Loc: [phone]    Mauro Benjamin is a 31 y.o. female who presents today for her medical conditions/complaintsas noted below. Mauro Benjamin is c/o of Generalized Body Aches, Chills, and Congestion        HPI:     Generalized Body Aches  This is a new problem. Episode onset: x4 days. The problem occurs daily. The problem has been unchanged. Associated symptoms include a change in bowel habit, chills, congestion, coughing, diaphoresis, fatigue, a fever, headaches and myalgias. Pertinent negatives include no chest pain, nausea, numbness, sore throat, vomiting or weakness. Nothing aggravates the symptoms. She has tried nothing for the symptoms. Cough  This is a new problem. Episode onset: x4 days. The problem has been unchanged. The problem occurs hourly. The cough is non-productive. Associated symptoms include chills, a fever, headaches and myalgias. Pertinent negatives include no chest pain, sore throat, shortness of breath or wheezing. Nothing aggravates the symptoms. Treatments tried: Dayquil. The treatment provided no relief. Patients  was covid + and she saw her last week for hair appt. She is not vaccinated. Past Medical History:   Diagnosis Date    Anxiety     Depression, acute 2019    Hypertension      Past Surgical History:   Procedure Laterality Date    APPENDECTOMY       SECTION      TUBAL LIGATION         History reviewed. No pertinent family history.     Social History     Tobacco Use    Smoking status: Current Every Day Smoker     Packs/day: 0.25    Smokeless tobacco: Never Used   Substance Use Topics    Alcohol use: No     Alcohol/week: 0.0 standard drinks      Current Outpatient Medications   Medication Sig Dispense Refill    brompheniramine-pseudoephedrine-DM (BROMFED DM) 2-30-10 MG/5ML syrup Take 10 mLs by mouth nightly as needed for Cough 118 mL 0    benzonatate (TESSALON) 100 MG capsule Take 1 capsule by mouth 2 times daily as needed for Cough 20 capsule 0    FLUoxetine (PROZAC) 20 MG capsule Take 1 capsule by mouth daily (Patient not taking: Reported on 4/6/2021) 30 capsule 0    lamoTRIgine (LAMICTAL) 25 MG tablet Take 2 tablets by mouth daily (Patient not taking: Reported on 4/6/2021) 60 tablet 0    ergocalciferol (ERGOCALCIFEROL) 13634 units capsule Take 1 capsule by mouth once a week for 12 doses 12 capsule 0    traZODone (DESYREL) 50 MG tablet Take 1 tablet by mouth nightly as needed for Sleep (Patient not taking: Reported on 4/6/2021) 30 tablet 0     No current facility-administered medications for this visit. Allergies   Allergen Reactions    Penicillins Anaphylaxis       Health Maintenance   Topic Date Due    Hepatitis C screen  Never done    Varicella vaccine (1 of 2 - 2-dose childhood series) Never done    Pneumococcal 0-64 years Vaccine (1 of 1 - PPSV23) Never done    HIV screen  Never done    COVID-19 Vaccine (1) Never done    DTaP/Tdap/Td vaccine (1 - Tdap) Never done    Cervical cancer screen  Never done    Flu vaccine (Season Ended) 09/01/2021    Hepatitis A vaccine  Aged Out    Hepatitis B vaccine  Aged Out    Hib vaccine  Aged Out    Meningococcal (ACWY) vaccine  Aged Out       Subjective:     Review of Systems   Constitutional: Positive for chills, diaphoresis, fatigue and fever. HENT: Positive for congestion. Negative for sore throat. Eyes: Negative. Respiratory: Positive for cough. Negative for chest tightness, shortness of breath and wheezing. Cardiovascular: Negative for chest pain and palpitations. Gastrointestinal: Positive for change in bowel habit. Negative for constipation, diarrhea, nausea and vomiting. Endocrine: Negative. Genitourinary: Negative. Musculoskeletal: Positive for myalgias. Skin: Negative. Neurological: Positive for headaches. Negative for dizziness, speech difficulty, weakness and numbness. Psychiatric/Behavioral: Negative for confusion. All other systems reviewed and are negative. Objective:     Physical Exam  Vitals signs and nursing note reviewed. Constitutional:       General: She is not in acute distress. Appearance: Normal appearance. She is not ill-appearing or toxic-appearing. HENT:      Head: Normocephalic and atraumatic. Right Ear: Tympanic membrane, ear canal and external ear normal.      Left Ear: Tympanic membrane, ear canal and external ear normal.      Nose: Congestion present. Mouth/Throat:      Mouth: Mucous membranes are moist.      Pharynx: Oropharynx is clear. Posterior oropharyngeal erythema present. Eyes:      Extraocular Movements: Extraocular movements intact. Conjunctiva/sclera: Conjunctivae normal.      Pupils: Pupils are equal, round, and reactive to light. Cardiovascular:      Rate and Rhythm: Normal rate and regular rhythm. Heart sounds: Normal heart sounds. Pulmonary:      Effort: Pulmonary effort is normal.      Breath sounds: Normal breath sounds. Musculoskeletal:         General: No swelling or signs of injury. Lymphadenopathy:      Cervical: No cervical adenopathy. Skin:     General: Skin is warm and dry. Findings: No rash. Neurological:      General: No focal deficit present. Mental Status: She is alert and oriented to person, place, and time. Motor: No weakness. Psychiatric:         Mood and Affect: Mood normal.       /88   Pulse 78   Temp 98.3 °F (36.8 °C)   Resp 22   Ht 5' 7\" (1.702 m)   Wt 239 lb 9.6 oz (108.7 kg)   LMP 03/21/2021   SpO2 100%   BMI 37.53 kg/m²     Assessment:       Diagnosis Orders   1. Viral upper respiratory infection     2. Body aches  POCT Influenza A/B   3. Cough  brompheniramine-pseudoephedrine-DM (BROMFED DM) 2-30-10 MG/5ML syrup    benzonatate (TESSALON) 100 MG capsule   4.  Screening for viral disease       Plan:      Orders Placed This Encounter   Procedures    POCT Influenza A/B     Results for orders placed or performed in visit on 04/06/21   POCT Influenza A/B   Result Value Ref Range    Influenza A Ab Negative     Influenza B Ab Negative          No follow-ups on file. Orders Placed This Encounter   Medications    brompheniramine-pseudoephedrine-DM (BROMFED DM) 2-30-10 MG/5ML syrup     Sig: Take 10 mLs by mouth nightly as needed for Cough     Dispense:  118 mL     Refill:  0    benzonatate (TESSALON) 100 MG capsule     Sig: Take 1 capsule by mouth 2 times daily as needed for Cough     Dispense:  20 capsule     Refill:  0       Patient given educational materials- see patient instructions. Discussed use, benefit, and side effects of prescribedmedications. All patient questions answered. Pt voiced understanding. Reviewedhealth maintenance. Instructed to continue current medications, diet and exercise. Patient agreed with treatment plan. Follow up as directed. Patient Instructions     You have been tested for coronavirus using a nasopharyngeal swab. You are to quarantine until your test results are back. This typically takes 2-3 days and we will call you with results. Use Bromfed DM and Tessalon Perles as prescribed. 1. Rest and increase water intake. 2. Monitor for fever and treat as needed with over the counter Tylenol/Ibuprofen per package instructions. 3. Treat symptoms such as cough/congestion with over the counter medications. 4. Go to ED if symptoms worsen or if you experience chest pain, shortness of breath, or a fever that is uncontrolled with medication. Patient Education        Learning About Coronavirus [phone])  What is coronavirus (COVID-19)? COVID-19 is a disease caused by a new type of coronavirus. This illness was first found in December 2019. It has since spread worldwide. Coronaviruses are a large group of viruses. They cause the common cold.  They also cause more serious illnesses like [place] respiratory syndrome (MERS) and severe acute respiratory syndrome (SARS). COVID-19 is caused by a novel coronavirus. That means it's a new type that has not been seen in people before. What are the symptoms? Coronavirus (COVID-19) symptoms may include:  · Fever. · Cough. · Trouble breathing. · Chills or repeated shaking with chills. · Muscle pain. · Headache. · Sore throat. · New loss of taste or smell. · Vomiting. · Diarrhea. In severe cases, COVID-19 can cause pneumonia and make it hard to breathe without help from a machine. It can cause death. How is it diagnosed? COVID-19 is diagnosed with a viral test. This may also be called a PCR test or antigen test. It looks for evidence of the virus in your breathing passages or lungs (respiratory system). The test is most often done on a sample from the nose, throat, or lungs. It's sometimes done on a sample of saliva. One way a sample is collected is by putting a long swab into the back of your nose. How is it treated? Mild cases of COVID-19 can be treated at home. Serious cases need treatment in the hospital. Treatment may include medicines to reduce symptoms, plus breathing support such as oxygen therapy or a ventilator. Some people may be placed on their belly to help their oxygen levels. Treatments that may help people who have COVID-19 include:  Antiviral medicines. These medicines treat viral infections. Remdesivir is an example. Immune-based therapy. These medicines help the immune system fight COVID-19. One example is bamlanivimab. It's a monoclonal antibody. Blood thinners. These medicines help prevent blood clots. People with severe illness are at risk for blood clots. How can you protect yourself and others? The best way to protect yourself from getting sick is to:  · Avoid areas where there is an outbreak. · Avoid contact with people who may be infected.   · Avoid crowds and try to stay at least 6 feet away from other people. · Wash your hands often, especially after you cough or sneeze. Use soap and water, and scrub for at least 20 seconds. If soap and water aren't available, use an alcohol-based hand . · Avoid touching your mouth, nose, and eyes. To help avoid spreading the virus to others:  · Stay home if you are sick or have been exposed to the virus. Don't go to school, work, or public areas. And don't use public transportation, ride-shares, or taxis unless you have no choice. · Wear a cloth face cover if you have to go to public areas. · Cover your mouth with a tissue when you cough or sneeze. Then throw the tissue in the trash and wash your hands right away. · If you're sick:  ? Leave your home only if you need to get medical care. But call the doctor's office first so they know you're coming. And wear a face cover. ? Wear the face cover whenever you're around other people. It can help stop the spread of the virus when you cough or sneeze. ? Limit contact with pets and people in your home. If possible, stay in a separate bedroom and use a separate bathroom. ? Clean and disinfect your home every day. Use household  and disinfectant wipes or sprays. Take special care to clean things that you grab with your hands. These include doorknobs, remote controls, phones, and handles on your refrigerator and microwave. And don't forget countertops, tabletops, bathrooms, and computer keyboards. When should you call for help? Call 911 anytime you think you may need emergency care. For example, call if you have life-threatening symptoms, such as:    · You have severe trouble breathing. (You can't talk at all.)     · You have constant chest pain or pressure.     · You are severely dizzy or lightheaded.     · You are confused or can't think clearly.     · Your face and lips have a blue color.     · You pass out (lose consciousness) or are very hard to wake up.    Call your doctor now or seek immediate medical care if:    · You have moderate trouble breathing. (You can't speak a full sentence.)     · You are coughing up blood (more than about 1 teaspoon).     · You have signs of low blood pressure. These include feeling lightheaded; being too weak to stand; and having cold, pale, clammy skin. Watch closely for changes in your health, and be sure to contact your doctor if:    · Your symptoms get worse.     · You are not getting better as expected. Call before you go to the doctor's office. Follow their instructions. And wear a cloth face cover. Current as of: December 18, 2020               Content Version: 12.8  © 2006-2021 [employer]. Care instructions adapted under license by [facility] [address] If you have questions about a medical condition or this instruction, always ask your healthcare professional. [place] [address] any warranty or liability for your use of this information. Patient Education        Cough: Care Instructions  Your Care Instructions     A cough is your body's response to something that bothers your throat or airways. Many things can cause a cough. You might cough because of a cold or the flu, bronchitis, or asthma. Smoking, postnasal drip, allergies, and stomach acid that backs up into your throat also can cause coughs. A cough is a symptom, not a disease. Most coughs stop when the cause, such as a cold, goes away. You can take a few steps at home to cough less and feel better. Follow-up care is a key part of your treatment and safety. Be sure to make and go to all appointments, and call your doctor if you are having problems. It's also a good idea to know your test results and keep a list of the medicines you take. How can you care for yourself at home? · Drink lots of water and other fluids. This helps thin the mucus and soothes a dry or sore throat. Honey or lemon juice in hot water or tea may ease a dry cough.   · Take cough medicine as directed by your doctor. · Prop up your head on pillows to help you breathe and ease a dry cough. · Try cough drops to soothe a dry or sore throat. Cough drops don't stop a cough. Medicine-flavored cough drops are no better than candy-flavored drops or hard candy. · Do not smoke. Avoid secondhand smoke. If you need help quitting, talk to your doctor about stop-smoking programs and medicines. These can increase your chances of quitting for good. When should you call for help? Call 911 anytime you think you may need emergency care. For example, call if:    · You have severe trouble breathing. Call your doctor now or seek immediate medical care if:    · You cough up blood.     · You have new or worse trouble breathing.     · You have a new or higher fever.     · You have a new rash. Watch closely for changes in your health, and be sure to contact your doctor if:    · You cough more deeply or more often, especially if you notice more mucus or a change in the color of your mucus.     · You have new symptoms, such as a sore throat, an earache, or sinus pain.     · You do not get better as expected. Where can you learn more? Go to https://GiPStech.[employer]. org and sign in to your [employer] account. Enter D279 in the Stroz Friedberg box to learn more about \"Cough: Care Instructions. \"     If you do not have an account, please click on the \"Sign Up Now\" link. Current as of: October 26, 2020               Content Version: 12.8  © 2006-2021 [employer]. Care instructions adapted under license by [facility] ([facility]). If you have questions about a medical condition or this instruction, always ask your healthcare professional. [place] [address] any warranty or liability for your use of this information. Patient Education        Viral Respiratory Infection: Care Instructions  Your Care Instructions     Viruses are very small organisms.  They grow in number after they enter your body. There are many types that cause different illnesses, such as colds and the mumps. The symptoms of a viral respiratory infection often start quickly. They include a fever, sore throat, and runny nose. You may also just not feel well. Or you may not want to eat much. Most viral respiratory infections are not serious. They usually get better with time and self-care. Antibiotics are not used to treat a viral infection. That's because antibiotics will not help cure a viral illness. In some cases, antiviral medicine can help your body fight a serious viral infection. Follow-up care is a key part of your treatment and safety. Be sure to make and go to all appointments, and call your doctor if you are having problems. It's also a good idea to know your test results and keep a list of the medicines you take. How can you care for yourself at home? · Rest as much as possible until you feel better. · Be safe with medicines. Take your medicine exactly as prescribed. Call your doctor if you think you are having a problem with your medicine. You will get more details on the specific medicine your doctor prescribes. · Take an over-the-counter pain medicine, such as acetaminophen (Tylenol), ibuprofen (Advil, Motrin), or naproxen (Aleve), as needed for pain and fever. Read and follow all instructions on the label. Do not give aspirin to anyone younger than 20. It has been linked to Reye syndrome, a serious illness. · Drink plenty of fluids. Hot fluids, such as tea or soup, may help relieve congestion in your nose and throat. If you have kidney, heart, or liver disease and have to limit fluids, talk with your doctor before you increase the amount of fluids you drink. · Try to clear mucus from your lungs by breathing deeply and coughing. · Gargle with warm salt water once an hour. This can help reduce swelling and throat pain. Use 1 teaspoon of salt mixed in 1 cup of warm water.   · Do not smoke or allow others to smoke around you. If you need help quitting, talk to your doctor about stop-smoking programs and medicines. These can increase your chances of quitting for good. To avoid spreading the virus  · Cough or sneeze into a tissue. Then throw the tissue away. · If you don't have a tissue, use your hand to cover your cough or sneeze. Then clean your hand. You can also cough into your sleeve. · Wash your hands often. Use soap and warm water. Wash for 15 to 20 seconds each time. · If you don't have soap and water near you, you can clean your hands with alcohol wipes or gel. When should you call for help? Call your doctor now or seek immediate medical care if:    · You have a new or higher fever.     · Your fever lasts more than 48 hours.     · You have trouble breathing.     · You have a fever with a stiff neck or a severe headache.     · You are sensitive to light.     · You feel very sleepy or confused. Watch closely for changes in your health, and be sure to contact your doctor if:    · You do not get better as expected. Where can you learn more? Go to https://CrowdSourcepeSaisei.MangoPlate. org and sign in to your [employer] account. Enter X492 in the [employer] box to learn more about \"Viral Respiratory Infection: Care Instructions. \"     If you do not have an account, please click on the \"Sign Up Now\" link. Current as of: October 26, 2020               Content Version: 12.8  © 2006-2021 Healthwise, [facility]. Care instructions adapted under license by [facility] ([facility]). If you have questions about a medical condition or this instruction, always ask your healthcare professional. [place] [address] any warranty or liability for your use of this information.                Electronically signed by GISELLE Srivastava CNP on 4/6/2021 at 12:58 PM

## 2021-04-08 LAB — SARS-COV-2, NAA: DETECTED

## 2021-11-04 ENCOUNTER — HOSPITAL ENCOUNTER (EMERGENCY)
Facility: HOSPITAL | Age: 31
Discharge: HOME | End: 2021-11-04
Admitting: EMERGENCY MEDICINE

## 2021-11-04 VITALS
BODY MASS INDEX: 37.35 KG/M2 | WEIGHT: 238 LBS | RESPIRATION RATE: 17 BRPM | DIASTOLIC BLOOD PRESSURE: 77 MMHG | HEART RATE: 64 BPM | SYSTOLIC BLOOD PRESSURE: 121 MMHG | HEIGHT: 67 IN | OXYGEN SATURATION: 100 % | TEMPERATURE: 98.3 F

## 2021-11-04 DIAGNOSIS — K08.89 PAIN, DENTAL: Primary | ICD-10-CM

## 2021-11-04 PROCEDURE — 25010000002 KETOROLAC TROMETHAMINE PER 15 MG: Performed by: NURSE PRACTITIONER

## 2021-11-04 PROCEDURE — 96372 THER/PROPH/DIAG INJ SC/IM: CPT

## 2021-11-04 PROCEDURE — 99283 EMERGENCY DEPT VISIT LOW MDM: CPT

## 2021-11-04 RX ORDER — KETOROLAC TROMETHAMINE 10 MG/1
10 TABLET, FILM COATED ORAL EVERY 6 HOURS PRN
Qty: 15 TABLET | Refills: 0 | Status: SHIPPED | OUTPATIENT
Start: 2021-11-04 | End: 2022-05-01 | Stop reason: SDUPTHER

## 2021-11-04 RX ORDER — CLINDAMYCIN HYDROCHLORIDE 300 MG/1
300 CAPSULE ORAL 4 TIMES DAILY
Qty: 40 CAPSULE | Refills: 0 | Status: SHIPPED | OUTPATIENT
Start: 2021-11-04 | End: 2021-11-14

## 2021-11-04 RX ORDER — KETOROLAC TROMETHAMINE 30 MG/ML
30 INJECTION, SOLUTION INTRAMUSCULAR; INTRAVENOUS ONCE
Status: COMPLETED | OUTPATIENT
Start: 2021-11-04 | End: 2021-11-04

## 2021-11-04 RX ADMIN — KETOROLAC TROMETHAMINE 30 MG: 30 INJECTION, SOLUTION INTRAMUSCULAR; INTRAVENOUS at 11:11

## 2021-11-04 NOTE — ED PROVIDER NOTES
Subjective   Patient is a 31-year-old female with complaints of dental pain.  She has history significant for ADD, anxiety, hypertension, PTSD.  She states that she has had a fractured tooth for nearly 2 years however on Saturday began developing pain to the area again.  She reports mild facial swelling to the left lower gumline.  She is concerned about a possible infection.  She denies any recorded fevers or vomiting.  Due to symptoms described she came the ER for evaluation and treatment.          Review of Systems   Constitutional: Negative.  Negative for fever.   HENT: Positive for dental problem.    Eyes: Negative.    Respiratory: Negative.  Negative for cough and shortness of breath.    Cardiovascular: Negative.  Negative for chest pain.   Gastrointestinal: Negative.  Negative for abdominal pain, diarrhea, nausea and vomiting.   Genitourinary: Negative.    Musculoskeletal: Negative.    Skin: Negative.    All other systems reviewed and are negative.      Past Medical History:   Diagnosis Date   • ADD (attention deficit disorder)    • Anxiety    • Dementia (HCC)    • Hypertension    • PTSD (post-traumatic stress disorder)        Allergies   Allergen Reactions   • Penicillins Anaphylaxis       Past Surgical History:   Procedure Laterality Date   • APPENDECTOMY     •  SECTION         Family History   Problem Relation Age of Onset   • No Known Problems Mother    • No Known Problems Father    • No Known Problems Sister    • No Known Problems Brother    • No Known Problems Son    • No Known Problems Daughter    • No Known Problems Maternal Grandmother    • No Known Problems Maternal Grandfather    • No Known Problems Paternal Grandmother    • No Known Problems Paternal Grandfather    • No Known Problems Cousin    • Rheum arthritis Neg Hx    • Osteoarthritis Neg Hx    • Asthma Neg Hx    • Diabetes Neg Hx    • Heart failure Neg Hx    • Hyperlipidemia Neg Hx    • Hypertension Neg Hx    • Migraines Neg Hx    •  Rashes / Skin problems Neg Hx    • Seizures Neg Hx    • Stroke Neg Hx    • Thyroid disease Neg Hx        Social History     Socioeconomic History   • Marital status: Single   Tobacco Use   • Smoking status: Current Every Day Smoker   • Smokeless tobacco: Never Used   Substance and Sexual Activity   • Alcohol use: Yes     Comment: social   • Drug use: Yes     Types: Marijuana     Comment: social   • Sexual activity: Defer           Objective   Physical Exam  Vitals and nursing note reviewed.   Constitutional:       Appearance: She is well-developed.   HENT:      Head: Normocephalic and atraumatic.      Right Ear: External ear normal.      Left Ear: External ear normal.      Nose: Nose normal.      Mouth/Throat:      Mouth: Mucous membranes are moist.      Pharynx: Oropharynx is clear.      Comments: Poor dentition noted with fractured and missing teeth identified, reports pain in particular to the left lower first premolar and left lower second premolar, no significant facial swelling identified, no trismus or difficulty with swallowing noted  Eyes:      Extraocular Movements: Extraocular movements intact.      Conjunctiva/sclera: Conjunctivae normal.      Pupils: Pupils are equal, round, and reactive to light.   Cardiovascular:      Rate and Rhythm: Normal rate and regular rhythm.      Pulses: Normal pulses.      Heart sounds: Normal heart sounds.   Pulmonary:      Effort: Pulmonary effort is normal.      Breath sounds: Normal breath sounds.   Abdominal:      General: Bowel sounds are normal.      Palpations: Abdomen is soft.   Musculoskeletal:         General: Normal range of motion.      Cervical back: Normal range of motion and neck supple.   Skin:     General: Skin is warm and dry.      Capillary Refill: Capillary refill takes less than 2 seconds.   Neurological:      General: No focal deficit present.      Mental Status: She is alert and oriented to person, place, and time.   Psychiatric:         Mood and  Affect: Mood normal.         Behavior: Behavior normal.         Thought Content: Thought content normal.         Judgment: Judgment normal.         Procedures           ED Course                                           MDM  Number of Diagnoses or Management Options  Pain, dental: new and does not require workup     Amount and/or Complexity of Data Reviewed  Discuss the patient with other providers: yes    Risk of Complications, Morbidity, and/or Mortality  Presenting problems: low  Diagnostic procedures: low  Management options: low    Patient Progress  Patient progress: improved      Final diagnoses:   Pain, dental       ED Disposition  ED Disposition     ED Disposition Condition Comment    Discharge Stable           No follow-up provider specified.       Medication List      New Prescriptions    clindamycin 300 MG capsule  Commonly known as: CLEOCIN  Take 1 capsule by mouth 4 (Four) Times a Day for 10 days.     ketorolac 10 MG tablet  Commonly known as: TORADOL  Take 1 tablet by mouth Every 6 (Six) Hours As Needed for Moderate Pain .           Where to Get Your Medications      These medications were sent to [facility] [address] [place] [address] - [address] - [phone] [facility] [phone]   [address] [address]    Phone: [phone]   · clindamycin 300 MG capsule  · ketorolac 10 MG tablet          Nessa Knight, APRN  11/04/21 [id number]

## 2022-04-11 ENCOUNTER — OFFICE VISIT (OUTPATIENT)
Age: 32
End: 2022-04-11
Payer: MEDICAID

## 2022-04-11 VITALS
BODY MASS INDEX: 37.76 KG/M2 | HEIGHT: 67 IN | RESPIRATION RATE: 22 BRPM | SYSTOLIC BLOOD PRESSURE: 142 MMHG | DIASTOLIC BLOOD PRESSURE: 90 MMHG | TEMPERATURE: 98.5 F | OXYGEN SATURATION: 99 % | WEIGHT: 240.6 LBS | HEART RATE: 111 BPM

## 2022-04-11 DIAGNOSIS — J03.90 TONSILLITIS: Primary | ICD-10-CM

## 2022-04-11 DIAGNOSIS — H66.92 ACUTE OTITIS MEDIA, LEFT: ICD-10-CM

## 2022-04-11 DIAGNOSIS — J02.9 SORE THROAT: ICD-10-CM

## 2022-04-11 LAB — S PYO AG THROAT QL: NORMAL

## 2022-04-11 PROCEDURE — 87880 STREP A ASSAY W/OPTIC: CPT | Performed by: NURSE PRACTITIONER

## 2022-04-11 PROCEDURE — 99213 OFFICE O/P EST LOW 20 MIN: CPT | Performed by: NURSE PRACTITIONER

## 2022-04-11 RX ORDER — CEFDINIR 300 MG/1
300 CAPSULE ORAL 2 TIMES DAILY
Qty: 20 CAPSULE | Refills: 0 | Status: SHIPPED | OUTPATIENT
Start: 2022-04-11 | End: 2022-04-21

## 2022-04-11 ASSESSMENT — ENCOUNTER SYMPTOMS
SORE THROAT: 1
COUGH: 0

## 2022-04-11 NOTE — PROGRESS NOTES
[address]  [address] [facility] [address] [address]  Dept: [phone]  Dept Fax: [phone]  Loc: [phone]    Doretha Clements is a 36 y.o. female who presents today for her medical conditions/complaintsas noted below. Doretha Clements is c/o of Pharyngitis (painful to swallow) and Headache        HPI:     Pharyngitis  This is a new problem. The current episode started yesterday. The problem occurs constantly. The problem has been unchanged. Associated symptoms include headaches and a sore throat. Pertinent negatives include no chills, congestion, coughing or fever. The symptoms are aggravated by swallowing. Treatments tried: allergy medicine, throat spray. The treatment provided no relief. Headache   Associated symptoms include a sore throat. Pertinent negatives include no coughing or fever. Past Medical History:   Diagnosis Date    Anxiety     Depression, acute 2019    Hypertension      Past Surgical History:   Procedure Laterality Date    APPENDECTOMY       SECTION      TUBAL LIGATION         History reviewed. No pertinent family history.     Social History     Tobacco Use    Smoking status: Current Every Day Smoker     Packs/day: 0.25    Smokeless tobacco: Never Used   Substance Use Topics    Alcohol use: No     Alcohol/week: 0.0 standard drinks      Current Outpatient Medications   Medication Sig Dispense Refill    cefdinir (OMNICEF) 300 MG capsule Take 1 capsule by mouth 2 times daily for 10 days 20 capsule 0    brompheniramine-pseudoephedrine-DM (BROMFED DM) 2-30-10 MG/5ML syrup Take 10 mLs by mouth nightly as needed for Cough (Patient not taking: Reported on 2022) 118 mL 0    FLUoxetine (PROZAC) 20 MG capsule Take 1 capsule by mouth daily (Patient not taking: Reported on 2021) 30 capsule 0    lamoTRIgine (LAMICTAL) 25 MG tablet Take 2 tablets by mouth daily (Patient not taking: Reported on 2021) Pupils are equal, round, and reactive to light. Cardiovascular:      Rate and Rhythm: Normal rate and regular rhythm. Heart sounds: Normal heart sounds. No murmur heard. Pulmonary:      Effort: Pulmonary effort is normal. No respiratory distress. Breath sounds: Normal breath sounds. No wheezing. Musculoskeletal:      Cervical back: Normal range of motion. Skin:     General: Skin is warm and dry. Findings: No rash. Neurological:      Mental Status: She is alert and oriented to person, place, and time. Psychiatric:         Mood and Affect: Mood normal.         Behavior: Behavior normal.       BP (!) 142/90   Pulse 111   Temp 98.5 °F (36.9 °C)   Resp 22   Ht 5' 7\" (1.702 m)   Wt 240 lb 9.6 oz (109.1 kg)   LMP 03/27/2022   SpO2 99%   BMI 37.68 kg/m²     :Assessment       Diagnosis Orders   1. Tonsillitis  cefdinir (OMNICEF) 300 MG capsule   2. Acute otitis media, left     3. Sore throat  POCT rapid strep A       :Plan       1. Take full course of antibiotics  2. Monitor for fever and treat as needed  3. Replace toothbrush in 24-48 hours after antibiotics are started  4. If patient is not improving or developing any new/worsening symptoms then return to clinic as needed or follow up with PCP   Orders Placed This Encounter   Procedures    POCT rapid strep A     Results for orders placed or performed in visit on 04/11/22   POCT rapid strep A   Result Value Ref Range    Strep A Ag None Detected None Detected         No follow-ups on file. Orders Placed This Encounter   Medications    cefdinir (OMNICEF) 300 MG capsule     Sig: Take 1 capsule by mouth 2 times daily for 10 days     Dispense:  20 capsule     Refill:  0       Patient given educational materials- see patient instructions. Discussed use, benefit, and side effects of prescribedmedications. All patient questions answered. Pt voiced understanding.      Patient Instructions       Patient Education        Tonsillitis: Care Instructions  Overview     Tonsillitis is an infection of the tonsils that is caused by bacteria or a virus. The tonsils are in the back of the throat and are part of the immunesystem. Tonsillitis typically lasts from a few days up to a couple of weeks. Tonsillitis caused by a virus goes away on its own. Tonsillitis caused by thebacteria that causes strep throat is treated with antibiotics. You and your doctor may consider surgery to remove the tonsils (tonsillectomy)if you have serious complications or repeat infections. Follow-up care is a key part of your treatment and safety. Be sure to make and go to all appointments, and call your doctor if you are having problems. It's also a good idea to know your test results and keep alist of the medicines you take. How can you care for yourself at home? Home care can help with a sore throat and other symptoms. Here are some thingsyou can do to help yourself feel better.  If your doctor prescribed antibiotics, take them as directed. Do not stop taking them just because you feel better. You need to take the full course of antibiotics.  Gargle with warm salt water. This helps reduce swelling and relieve discomfort. Gargle once an hour with 1 teaspoon of salt mixed in 8 fluid ounces of warm water.  Take an over-the-counter pain medicine, such as acetaminophen (Tylenol), ibuprofen (Advil, Motrin), or naproxen (Aleve). Be safe with medicines. Read and follow all instructions on the label. No one younger than 20 should take aspirin. It has been linked to Reye syndrome, a serious illness.  Be careful when taking over-the-counter cold or flu medicines and Tylenol at the same time. Many of these medicines have acetaminophen, which is Tylenol. Read the labels to make sure that you are not taking more than the recommended dose. Too much acetaminophen (Tylenol) can be harmful.  Try lozenges or an over-the-counter throat spray to relieve throat pain.    Drink plenty of fluids. Fluids may help soothe an irritated throat. Drink warm or cool liquids (whichever feels better). These include tea, soup, and juice.  Do not smoke, and avoid secondhand smoke. Smoking can make tonsillitis worse. If you need help quitting, talk to your doctor about stop-smoking programs and medicines. These can increase your chances of quitting for good.  Use a vaporizer or humidifier to add moisture to your bedroom. Follow the directions for cleaning the machine.  Get plenty of rest.  When should you call for help? Call your doctor now or seek immediate medical care if:     Your pain gets worse on one side of your throat.      You have a new or higher fever.      You notice changes in your voice.      You have trouble opening your mouth.      You have any trouble breathing.      You have much more trouble swallowing.      You have a fever with a stiff neck or a severe headache.      You are sensitive to light or feel very sleepy or confused. Watch closely for changes in your health, and be sure to contact your doctor if:     You do not get better after 2 days. Where can you learn more? Go to https://Bon-[email or web address]©.Torsion Mobile. org and sign in to your [employer] account. Enter Y017 in the [facility] box to learn more about \"Tonsillitis: Care Instructions. \"     If you do not have an account, please click on the \"Sign Up Now\" link. Current as of: September 8, 2021               Content Version: 13.2  © 1319-7335 Healthwise, Incorporated. Care instructions adapted under license by [facility] ([facility]). If you have questions about a medical condition or this instruction, always ask your healthcare professional. [address] any warranty or liability for your use of this information. 1. Take full course of antibiotics  2. Monitor for fever and treat as needed  3. Replace toothbrush in 24-48 hours after antibiotics are started  4.  If patient is not improving or developing any new/worsening symptoms then return to clinic as needed or follow up with PCP             Electronically signed by GISELLE Hernández on 4/11/2022 at 12:12 PM

## 2022-04-11 NOTE — PATIENT INSTRUCTIONS
Patient Education        Tonsillitis: Care Instructions  Overview     Tonsillitis is an infection of the tonsils that is caused by bacteria or a virus. The tonsils are in the back of the throat and are part of the immunesystem. Tonsillitis typically lasts from a few days up to a couple of weeks. Tonsillitis caused by a virus goes away on its own. Tonsillitis caused by thebacteria that causes strep throat is treated with antibiotics. You and your doctor may consider surgery to remove the tonsils (tonsillectomy)if you have serious complications or repeat infections. Follow-up care is a key part of your treatment and safety. Be sure to make and go to all appointments, and call your doctor if you are having problems. It's also a good idea to know your test results and keep alist of the medicines you take. How can you care for yourself at home? Home care can help with a sore throat and other symptoms. Here are some thingsyou can do to help yourself feel better.  If your doctor prescribed antibiotics, take them as directed. Do not stop taking them just because you feel better. You need to take the full course of antibiotics.  Gargle with warm salt water. This helps reduce swelling and relieve discomfort. Gargle once an hour with 1 teaspoon of salt mixed in 8 fluid ounces of warm water.  Take an over-the-counter pain medicine, such as acetaminophen (Tylenol), ibuprofen (Advil, Motrin), or naproxen (Aleve). Be safe with medicines. Read and follow all instructions on the label. No one younger than 20 should take aspirin. It has been linked to Reye syndrome, a serious illness.  Be careful when taking over-the-counter cold or flu medicines and Tylenol at the same time. Many of these medicines have acetaminophen, which is Tylenol. Read the labels to make sure that you are not taking more than the recommended dose. Too much acetaminophen (Tylenol) can be harmful.    Try lozenges or an over-the-counter throat spray to relieve throat pain.  Drink plenty of fluids. Fluids may help soothe an irritated throat. Drink warm or cool liquids (whichever feels better). These include tea, soup, and juice.  Do not smoke, and avoid secondhand smoke. Smoking can make tonsillitis worse. If you need help quitting, talk to your doctor about stop-smoking programs and medicines. These can increase your chances of quitting for good.  Use a vaporizer or humidifier to add moisture to your bedroom. Follow the directions for cleaning the machine.  Get plenty of rest.  When should you call for help? Call your doctor now or seek immediate medical care if:     Your pain gets worse on one side of your throat.      You have a new or higher fever.      You notice changes in your voice.      You have trouble opening your mouth.      You have any trouble breathing.      You have much more trouble swallowing.      You have a fever with a stiff neck or a severe headache.      You are sensitive to light or feel very sleepy or confused. Watch closely for changes in your health, and be sure to contact your doctor if:     You do not get better after 2 days. Where can you learn more? Go to https://Musicmetric.WazeTrip. org and sign in to your [employer] account. Enter H518 in the [facility] box to learn more about \"Tonsillitis: Care Instructions. \"     If you do not have an account, please click on the \"Sign Up Now\" link. Current as of: September 8, 2021               Content Version: 13.2  © 2006-2022 Healthwise, [facility]. Care instructions adapted under license by [facility] ([facility]). If you have questions about a medical condition or this instruction, always ask your healthcare professional. [place] [address] any warranty or liability for your use of this information. 1. Take full course of antibiotics  2. Monitor for fever and treat as needed  3.  Replace toothbrush in 24-48 hours after antibiotics are started  4.  If patient is not improving or developing any new/worsening symptoms then return to clinic as needed or follow up with PCP

## 2022-04-11 NOTE — LETTER
[facility]  [address] [place] [address] [address] [id number]  Phone: [phone]  Fax: GISELLE Oliveros        April 11, 2022     Patient: Vivek Redmond   YOB: 1990   Date of Visit: 4/11/2022       To Whom it May Concern:    Vivek Redmond was seen in my clinic on 4/11/2022. She may return to work on 04/12/2022. If you have any questions or concerns, please don't hesitate to call.     Sincerely,         GISELLE Bernardo

## 2022-05-01 ENCOUNTER — HOSPITAL ENCOUNTER (EMERGENCY)
Facility: HOSPITAL | Age: 32
Discharge: HOME | End: 2022-05-01
Admitting: STUDENT IN AN ORGANIZED HEALTH CARE EDUCATION/TRAINING PROGRAM

## 2022-05-01 ENCOUNTER — APPOINTMENT (OUTPATIENT)
Dept: CT IMAGING | Facility: HOSPITAL | Age: 32
End: 2022-05-01

## 2022-05-01 ENCOUNTER — APPOINTMENT (OUTPATIENT)
Dept: ULTRASOUND IMAGING | Facility: HOSPITAL | Age: 32
End: 2022-05-01

## 2022-05-01 VITALS
TEMPERATURE: 98.3 F | SYSTOLIC BLOOD PRESSURE: 137 MMHG | RESPIRATION RATE: 17 BRPM | OXYGEN SATURATION: 99 % | BODY MASS INDEX: 38.61 KG/M2 | HEIGHT: 67 IN | DIASTOLIC BLOOD PRESSURE: 71 MMHG | HEART RATE: 71 BPM | WEIGHT: 246 LBS

## 2022-05-01 DIAGNOSIS — R31.9 URINARY TRACT INFECTION WITH HEMATURIA, SITE UNSPECIFIED: ICD-10-CM

## 2022-05-01 DIAGNOSIS — N39.0 URINARY TRACT INFECTION WITH HEMATURIA, SITE UNSPECIFIED: ICD-10-CM

## 2022-05-01 DIAGNOSIS — K80.20 GALLSTONES: Primary | ICD-10-CM

## 2022-05-01 DIAGNOSIS — K52.9 ENTERITIS: ICD-10-CM

## 2022-05-01 LAB
ALBUMIN SERPL-MCNC: 4.1 G/DL (ref 3.5–5.2)
ALBUMIN/GLOB SERPL: 1.2 G/DL
ALP SERPL-CCNC: 72 U/L (ref 39–117)
ALT SERPL W P-5'-P-CCNC: 15 U/L (ref 1–33)
ANION GAP SERPL CALCULATED.3IONS-SCNC: 11 MMOL/L (ref 5–15)
AST SERPL-CCNC: 19 U/L (ref 1–32)
B-HCG UR QL: NEGATIVE
BACTERIA UR QL AUTO: ABNORMAL /HPF
BASOPHILS # BLD AUTO: 0.07 10*3/MM3 (ref 0–0.2)
BASOPHILS NFR BLD AUTO: 0.7 % (ref 0–1.5)
BILIRUB SERPL-MCNC: 0.2 MG/DL (ref 0–1.2)
BILIRUB UR QL STRIP: NEGATIVE
BUN SERPL-MCNC: 12 MG/DL (ref 6–20)
BUN/CREAT SERPL: 16.2 (ref 7–25)
CALCIUM SPEC-SCNC: 8.9 MG/DL (ref 8.6–10.5)
CHLORIDE SERPL-SCNC: 106 MMOL/L (ref 98–107)
CLARITY UR: CLEAR
CO2 SERPL-SCNC: 22 MMOL/L (ref 22–29)
COLOR UR: YELLOW
CREAT SERPL-MCNC: 0.74 MG/DL (ref 0.57–1)
DEPRECATED RDW RBC AUTO: 45 FL (ref 37–54)
EGFRCR SERPLBLD CKD-EPI 2021: 112.5 ML/MIN/1.73
EOSINOPHIL # BLD AUTO: 0.35 10*3/MM3 (ref 0–0.4)
EOSINOPHIL NFR BLD AUTO: 3.6 % (ref 0.3–6.2)
ERYTHROCYTE [DISTWIDTH] IN BLOOD BY AUTOMATED COUNT: 13.7 % (ref 12.3–15.4)
EXPIRATION DATE: NORMAL
GLOBULIN UR ELPH-MCNC: 3.4 GM/DL
GLUCOSE SERPL-MCNC: 90 MG/DL (ref 65–99)
GLUCOSE UR STRIP-MCNC: NEGATIVE MG/DL
HCT VFR BLD AUTO: 35.6 % (ref 34–46.6)
HGB BLD-MCNC: 11.9 G/DL (ref 12–15.9)
HGB UR QL STRIP.AUTO: ABNORMAL
HYALINE CASTS UR QL AUTO: ABNORMAL /LPF
IMM GRANULOCYTES # BLD AUTO: 0.02 10*3/MM3 (ref 0–0.05)
IMM GRANULOCYTES NFR BLD AUTO: 0.2 % (ref 0–0.5)
INTERNAL NEGATIVE CONTROL: NEGATIVE
INTERNAL POSITIVE CONTROL: POSITIVE
KETONES UR QL STRIP: ABNORMAL
LEUKOCYTE ESTERASE UR QL STRIP.AUTO: ABNORMAL
LIPASE SERPL-CCNC: 21 U/L (ref 13–60)
LYMPHOCYTES # BLD AUTO: 3.55 10*3/MM3 (ref 0.7–3.1)
LYMPHOCYTES NFR BLD AUTO: 36.3 % (ref 19.6–45.3)
Lab: NORMAL
MCH RBC QN AUTO: 30.1 PG (ref 26.6–33)
MCHC RBC AUTO-ENTMCNC: 33.4 G/DL (ref 31.5–35.7)
MCV RBC AUTO: 89.9 FL (ref 79–97)
MONOCYTES # BLD AUTO: 0.76 10*3/MM3 (ref 0.1–0.9)
MONOCYTES NFR BLD AUTO: 7.8 % (ref 5–12)
NEUTROPHILS NFR BLD AUTO: 5.03 10*3/MM3 (ref 1.7–7)
NEUTROPHILS NFR BLD AUTO: 51.4 % (ref 42.7–76)
NITRITE UR QL STRIP: NEGATIVE
NRBC BLD AUTO-RTO: 0 /100 WBC (ref 0–0.2)
PH UR STRIP.AUTO: 6.5 [PH] (ref 5–8)
PLATELET # BLD AUTO: 268 10*3/MM3 (ref 140–450)
PMV BLD AUTO: 11.1 FL (ref 6–12)
POTASSIUM SERPL-SCNC: 3.9 MMOL/L (ref 3.5–5.2)
PROT SERPL-MCNC: 7.5 G/DL (ref 6–8.5)
PROT UR QL STRIP: NEGATIVE
RBC # BLD AUTO: 3.96 10*6/MM3 (ref 3.77–5.28)
RBC # UR STRIP: ABNORMAL /HPF
REF LAB TEST METHOD: ABNORMAL
SODIUM SERPL-SCNC: 139 MMOL/L (ref 136–145)
SP GR UR STRIP: 1.02 (ref 1–1.03)
SQUAMOUS #/AREA URNS HPF: ABNORMAL /HPF
UROBILINOGEN UR QL STRIP: ABNORMAL
WBC # UR STRIP: ABNORMAL /HPF
WBC NRBC COR # BLD: 9.78 10*3/MM3 (ref 3.4–10.8)

## 2022-05-01 PROCEDURE — 99284 EMERGENCY DEPT VISIT MOD MDM: CPT

## 2022-05-01 PROCEDURE — 76856 US EXAM PELVIC COMPLETE: CPT

## 2022-05-01 PROCEDURE — 96374 THER/PROPH/DIAG INJ IV PUSH: CPT

## 2022-05-01 PROCEDURE — 96375 TX/PRO/DX INJ NEW DRUG ADDON: CPT

## 2022-05-01 PROCEDURE — 85025 COMPLETE CBC W/AUTO DIFF WBC: CPT | Performed by: PHYSICIAN ASSISTANT

## 2022-05-01 PROCEDURE — 74177 CT ABD & PELVIS W/CONTRAST: CPT

## 2022-05-01 PROCEDURE — 96361 HYDRATE IV INFUSION ADD-ON: CPT

## 2022-05-01 PROCEDURE — 87491 CHLMYD TRACH DNA AMP PROBE: CPT | Performed by: PHYSICIAN ASSISTANT

## 2022-05-01 PROCEDURE — 25010000002 IOPAMIDOL 61 % SOLUTION: Performed by: PHYSICIAN ASSISTANT

## 2022-05-01 PROCEDURE — 81025 URINE PREGNANCY TEST: CPT | Performed by: PHYSICIAN ASSISTANT

## 2022-05-01 PROCEDURE — 87147 CULTURE TYPE IMMUNOLOGIC: CPT | Performed by: PHYSICIAN ASSISTANT

## 2022-05-01 PROCEDURE — 87086 URINE CULTURE/COLONY COUNT: CPT | Performed by: PHYSICIAN ASSISTANT

## 2022-05-01 PROCEDURE — 87661 TRICHOMONAS VAGINALIS AMPLIF: CPT | Performed by: PHYSICIAN ASSISTANT

## 2022-05-01 PROCEDURE — 25010000002 ONDANSETRON PER 1 MG: Performed by: STUDENT IN AN ORGANIZED HEALTH CARE EDUCATION/TRAINING PROGRAM

## 2022-05-01 PROCEDURE — 25010000002 MORPHINE PER 10 MG: Performed by: STUDENT IN AN ORGANIZED HEALTH CARE EDUCATION/TRAINING PROGRAM

## 2022-05-01 PROCEDURE — 80053 COMPREHEN METABOLIC PANEL: CPT | Performed by: PHYSICIAN ASSISTANT

## 2022-05-01 PROCEDURE — 81001 URINALYSIS AUTO W/SCOPE: CPT | Performed by: PHYSICIAN ASSISTANT

## 2022-05-01 PROCEDURE — 87591 N.GONORRHOEAE DNA AMP PROB: CPT | Performed by: PHYSICIAN ASSISTANT

## 2022-05-01 PROCEDURE — 83690 ASSAY OF LIPASE: CPT | Performed by: PHYSICIAN ASSISTANT

## 2022-05-01 RX ORDER — SODIUM CHLORIDE 0.9 % (FLUSH) 0.9 %
10 SYRINGE (ML) INJECTION AS NEEDED
Status: DISCONTINUED | OUTPATIENT
Start: 2022-05-01 | End: 2022-05-01 | Stop reason: HOSPADM

## 2022-05-01 RX ORDER — KETOROLAC TROMETHAMINE 10 MG/1
10 TABLET, FILM COATED ORAL EVERY 6 HOURS PRN
Qty: 9 TABLET | Refills: 0 | Status: SHIPPED | OUTPATIENT
Start: 2022-05-01 | End: 2022-05-04

## 2022-05-01 RX ORDER — ONDANSETRON 2 MG/ML
4 INJECTION INTRAMUSCULAR; INTRAVENOUS ONCE
Status: COMPLETED | OUTPATIENT
Start: 2022-05-01 | End: 2022-05-01

## 2022-05-01 RX ORDER — SULFAMETHOXAZOLE AND TRIMETHOPRIM 800; 160 MG/1; MG/1
1 TABLET ORAL 2 TIMES DAILY
Qty: 20 TABLET | Refills: 0 | Status: SHIPPED | OUTPATIENT
Start: 2022-05-01 | End: 2022-05-11

## 2022-05-01 RX ORDER — SODIUM CHLORIDE, SODIUM LACTATE, POTASSIUM CHLORIDE, CALCIUM CHLORIDE 600; 310; 30; 20 MG/100ML; MG/100ML; MG/100ML; MG/100ML
100 INJECTION, SOLUTION INTRAVENOUS CONTINUOUS
Status: DISCONTINUED | OUTPATIENT
Start: 2022-05-01 | End: 2022-05-01 | Stop reason: HOSPADM

## 2022-05-01 RX ADMIN — SODIUM CHLORIDE, POTASSIUM CHLORIDE, SODIUM LACTATE AND CALCIUM CHLORIDE 100 ML/HR: 600; 310; 30; 20 INJECTION, SOLUTION INTRAVENOUS at 18:21

## 2022-05-01 RX ADMIN — IOPAMIDOL 100 ML: 612 INJECTION, SOLUTION INTRAVENOUS at 19:55

## 2022-05-01 RX ADMIN — ONDANSETRON 4 MG: 2 INJECTION INTRAMUSCULAR; INTRAVENOUS at 20:13

## 2022-05-01 RX ADMIN — MORPHINE SULFATE 4 MG: 4 INJECTION, SOLUTION INTRAMUSCULAR; INTRAVENOUS at 20:13

## 2022-05-01 NOTE — ED PROVIDER NOTES
Subjective   History of Present Illness    Patient is a pleasant 31-year-old female chief complaint of right lower quadrant abdominal pain and pelvic pain.  The patient describes her symptoms began about 1 week ago.  She reports it began around the time she starts her menstrual cycle.  The patient started her period.  She noted some discomfort there that was somewhat tolerable.  She reports pain is worse with any type of positions but present regardless.  She denies any symptoms worse with urination, bowel movement, or eating.  She has tried Tylenol, ibuprofen, and Midol which initially did provide relief.  However, and has not helped today.  She noted that through the week, she experiences sudden severe popping pain in the right lower quadrant and she had heavy vaginal bleeding immediately thereafter.  The bleeding has improved.  She thought she may ruptured an ovarian cyst which she has had ovarian cyst before when she was pregnant but it did not cause her this much pain.  She reports that she completed an appendectomy.  She denies any fever.  She denies any vaginal discharge.  She describes that she is monogamous with her partner for the past 3 years but he admitted that he was unfaithful several months ago.  She denies any symptoms of STIs.     Review of Systems   Constitutional: Positive for activity change. Negative for fever.   HENT: Negative.    Respiratory: Negative.    Cardiovascular: Negative.    Gastrointestinal: Positive for abdominal pain. Negative for constipation, diarrhea, nausea and vomiting.   Genitourinary: Positive for menstrual problem, pelvic pain and vaginal bleeding. Negative for difficulty urinating, vaginal discharge and vaginal pain.   Musculoskeletal: Negative.    Neurological: Negative.    Psychiatric/Behavioral: Negative.    All other systems reviewed and are negative.      Past Medical History:   Diagnosis Date   • ADD (attention deficit disorder)    • Anxiety    • Hypertension    •  PTSD (post-traumatic stress disorder)        Allergies   Allergen Reactions   • Penicillins Anaphylaxis       Past Surgical History:   Procedure Laterality Date   • APPENDECTOMY     •  SECTION         Family History   Problem Relation Age of Onset   • No Known Problems Mother    • No Known Problems Father    • No Known Problems Sister    • No Known Problems Brother    • No Known Problems Son    • No Known Problems Daughter    • No Known Problems Maternal Grandmother    • No Known Problems Maternal Grandfather    • No Known Problems Paternal Grandmother    • No Known Problems Paternal Grandfather    • No Known Problems Cousin    • Rheum arthritis Neg Hx    • Osteoarthritis Neg Hx    • Asthma Neg Hx    • Diabetes Neg Hx    • Heart failure Neg Hx    • Hyperlipidemia Neg Hx    • Hypertension Neg Hx    • Migraines Neg Hx    • Rashes / Skin problems Neg Hx    • Seizures Neg Hx    • Stroke Neg Hx    • Thyroid disease Neg Hx        Social History     Socioeconomic History   • Marital status: Single   Tobacco Use   • Smoking status: Current Every Day Smoker   • Smokeless tobacco: Never Used   Substance and Sexual Activity   • Alcohol use: Yes     Comment: social   • Drug use: Yes     Types: Marijuana     Comment: social   • Sexual activity: Defer       Prior to Admission medications    Medication Sig Start Date End Date Taking? Authorizing Provider   amphetamine-dextroamphetamine (ADDERALL) 10 MG tablet Take 10 mg by mouth Daily.    Emergency, Nurse Epic, RN   cyclobenzaprine (FLEXERIL) 10 MG tablet Take 1 tablet by mouth 3 (Three) Times a Day As Needed for Muscle Spasms. 17   Rhett Cuba MD   FLUoxetine (PROzac) 20 MG capsule 40 mg. 17   ProviderCynthia MD   ketorolac (TORADOL) 10 MG tablet Take 1 tablet by mouth Every 6 (Six) Hours As Needed for Moderate Pain . 21   Nessa Knight APRN   LamoTRIgine (LAMICTAL PO) Take  by mouth.    Emergency, Nurse Sumi RN   omeprazole (priLOSEC)  "40 MG capsule Take 1 capsule by mouth Daily. 6/19/17   Rhett Cuba MD   Unable to find 1 each 1 (One) Time. BLOOD PRESSURE MEDICATION THAT SHE TAKES AT NIGHT   Prazosin??    Emergency, Nurse Epic, RN       Medications   morphine injection 4 mg (4 mg Intravenous Given 5/1/22 2013)   ondansetron (ZOFRAN) injection 4 mg (4 mg Intravenous Given 5/1/22 2013)   iopamidol (ISOVUE-300) 61 % injection 100 mL (100 mL Intravenous Given 5/1/22 1955)       /71 (BP Location: Left arm, Patient Position: Lying)   Pulse 71   Temp 98.3 °F (36.8 °C) (Oral)   Resp 17   Ht 170.2 cm (67\")   Wt 112 kg (246 lb)   LMP 04/27/2022   SpO2 99%   BMI 38.53 kg/m²       Objective   Physical Exam  Vitals and nursing note reviewed. Exam conducted with a chaperone present.   Constitutional:       General: She is not in acute distress.     Appearance: She is well-developed. She is not diaphoretic.   HENT:      Head: Normocephalic and atraumatic.   Eyes:      Extraocular Movements: Extraocular movements intact.      Conjunctiva/sclera: Conjunctivae normal.      Pupils: Pupils are equal, round, and reactive to light.   Neck:      Trachea: No tracheal deviation.   Cardiovascular:      Rate and Rhythm: Normal rate and regular rhythm.      Heart sounds: Normal heart sounds. No murmur heard.  Pulmonary:      Effort: Pulmonary effort is normal.      Breath sounds: Normal breath sounds.   Abdominal:      General: Bowel sounds are normal. There is no distension.      Palpations: Abdomen is soft. There is no mass.      Tenderness: There is abdominal tenderness in the right lower quadrant. There is no guarding or rebound.       Genitourinary:     Exam position: Supine.      Vagina: Bleeding present.      Cervix: No friability.      Adnexa: Right adnexa normal and left adnexa normal.        Right: No tenderness.          Left: No tenderness.     Musculoskeletal:         General: Normal range of motion.      Cervical back: Normal range of " motion and neck supple.   Skin:     General: Skin is warm and dry.      Capillary Refill: Capillary refill takes less than 2 seconds.   Neurological:      General: No focal deficit present.      Mental Status: She is alert and oriented to person, place, and time.      Deep Tendon Reflexes: Reflexes are normal and symmetric.   Psychiatric:         Mood and Affect: Mood normal.         Behavior: Behavior normal.         Thought Content: Thought content normal.         Judgment: Judgment normal.         Procedures         Lab Results (last 24 hours)     ** No results found for the last 24 hours. **          US Pelvis Complete    Result Date: 5/1/2022  Narrative: EXAMINATION:  US PELVIS COMPLETE-  5/1/2022 7:29 PM CDT  HISTORY: Right lower quadrant abdominal pain. Rule out ovarian torsion.  COMPARISON: No comparison study.  TECHNIQUE: Transabdominal grayscale, color-flow and spectral Doppler imaging were performed of the pelvis.  FINDINGS: The uterus measures 8 x 4.9 x 5.4 cm. The endometrium measures 3.4 mm. The right ovary measures 2.9 x 2.1 x 1.8 cm and the left ovary measures 3.5 x 2.3 x 2.3 cm. There is arterial and venous blood flow within both ovaries. There is no free fluid identified in the pelvis.      Impression: 1. Normal appearance of the uterus and ovaries, as described. 2. Please see the abdomen and pelvis CT report separately that was ordered and performed before these results were available. This report was finalized on 05/01/2022 20:00 by Dr. King Odell MD.    CT Abdomen Pelvis With Contrast    Result Date: 5/1/2022  Narrative: EXAMINATION:  CT ABDOMEN PELVIS W CONTRAST-  5/1/2022 7:50 PM CDT  HISTORY: Severe right lower quadrant pain. Ultrasound of the pelvis was also ordered and performed.  TECHNIQUE: Spiral CT was performed of the abdomen and pelvis with IV contrast. Multiplanar images were reconstructed.  DLP: 882 mGy-cm. Automated dosage reduction technique was utilized to reduce patient dose.   COMPARISON: 6/30/2009.  LUNG BASES: The lung bases are clear.  LIVER AND SPLEEN: There are multiple gallstones in the gallbladder. There is no gallbladder wall thickening. There is mild fatty liver. The spleen is unremarkable.  PANCREAS: No pancreatic mass or inflammatory change.  KIDNEYS AND ADRENALS: The adrenal glands and right kidney demonstrate no abnormality. There is a 3 mm low-density left renal lesion. The renal collecting systems are nondilated. There is thickening of the bladder wall. The bladder is not well-distended.  BOWEL: No oral contrast given. Gastric wall thickening probably due to underdistention. There are some fluid-filled small bowel loops are nondilated. Normal stool in the colon. Prior appendectomy.  OTHER: No pelvic mass is seen. Small amount of free fluid in the pelvis. Minimal fat-containing umbilical hernia. Small retroperitoneal and peritoneal lymph nodes. Small groin lymph nodes. No acute bony abnormality.      Impression: 1. Multiple gallstones in the gallbladder. No gallbladder wall thickening. 2. Mild fatty liver. 3. There are some fluid-filled small bowel loops. This is likely normal. It can be seen with enteritis. Prior appendectomy. Gastric wall thickening likely due to underdistention. Normal amount of stool in the colon. 4. Small amount of free fluid in the pelvis. 5. Minimal fat-containing umbilical hernia. 6. Small lymph nodes are likely reactive. No pathologically enlarged lymph nodes.  The full report of this exam was immediately signed and available to the emergency room. The patient is currently in the emergency room. This report was finalized on 05/01/2022 20:05 by Dr. King Odell MD.      ED Course  ED Course as of 05/10/22 1523   Sun May 01, 2022   1949 I reviewed this case with DESIREE Reyes, who will be assuming the patient's care.  [TK]   2120 CT of the abdomen/pelvis - IMPRESSION: 1. Multiple gallstones in the gallbladder. No gallbladder wall  thickening. 2. Mild fatty liver. 3. There are some fluid-filled small bowel loops. This is likely normal. It can be seen with enteritis. Prior appendectomy. Gastric wall  thickening likely due to underdistention. Normal amount of stool in the colon. 4. Small amount of free fluid in the pelvis. 5. Minimal fat-containing umbilical hernia. 6. Small lymph nodes are likely reactive. No pathologically enlarged lymph nodes.  Pelvic US - IMPRESSION: 1. Normal appearance of the uterus and ovaries, as described. 2. Please see the abdomen and pelvis CT report separately that was ordered and performed before these results were available.  Blood work and UA reviewed.  I discussed her testing results with her.  She voiced understanding of results and instructions.  [KS]      ED Course User Index  [KS] Lior Leung APRN  [TK] Carlos Matute PA          MDM  Number of Diagnoses or Management Options  Enteritis: minor  Gallstones: minor  Urinary tract infection with hematuria, site unspecified: minor     Amount and/or Complexity of Data Reviewed  Clinical lab tests: ordered and reviewed  Tests in the radiology section of CPT®: ordered and reviewed  Decide to obtain previous medical records or to obtain history from someone other than the patient: yes    Risk of Complications, Morbidity, and/or Mortality  Presenting problems: minimal  Diagnostic procedures: minimal  Management options: minimal        Final diagnoses:   Gallstones   Enteritis   Urinary tract infection with hematuria, site unspecified          Lior Leung APRN  05/10/22 1523

## 2022-05-02 ENCOUNTER — TELEPHONE (OUTPATIENT)
Dept: EMERGENCY DEPT | Facility: HOSPITAL | Age: 32
End: 2022-05-02

## 2022-05-02 LAB
BACTERIA SPEC AEROBE CULT: ABNORMAL
C TRACH RRNA CVX QL NAA+PROBE: DETECTED
N GONORRHOEA RRNA SPEC QL NAA+PROBE: NOT DETECTED
TRICHOMONAS VAGINALIS PCR: DETECTED

## 2022-05-02 RX ORDER — METRONIDAZOLE 500 MG/1
500 TABLET ORAL 3 TIMES DAILY
COMMUNITY
Start: 2022-05-02 | End: 2022-05-03 | Stop reason: SDUPTHER

## 2022-05-02 RX ORDER — AZITHROMYCIN 1 G
1 PACKET (EA) ORAL ONCE
Qty: 1 PACKET | Refills: 0 | Status: SHIPPED | OUTPATIENT
Start: 2022-05-02 | End: 2022-05-02

## 2022-05-02 RX ORDER — AZITHROMYCIN 1 G
1 PACKET (EA) ORAL ONCE
COMMUNITY
Start: 2022-05-02 | End: 2022-05-03 | Stop reason: SDUPTHER

## 2022-05-02 RX ORDER — METRONIDAZOLE 500 MG/1
500 TABLET ORAL 3 TIMES DAILY
Qty: 30 TABLET | Refills: 0 | OUTPATIENT
Start: 2022-05-02 | End: 2022-05-12

## 2022-05-02 RX ORDER — KETOROLAC TROMETHAMINE 10 MG/1
10 TABLET, FILM COATED ORAL EVERY 6 HOURS PRN
COMMUNITY
Start: 2022-05-01 | End: 2022-10-09

## 2022-05-02 RX ORDER — SULFAMETHOXAZOLE AND TRIMETHOPRIM 800; 160 MG/1; MG/1
1 TABLET ORAL 2 TIMES DAILY
COMMUNITY
Start: 2022-05-01 | End: 2022-05-12

## 2022-05-02 NOTE — DISCHARGE INSTRUCTIONS
Drink plenty of fluid. [place] diet. Medication as ordered.  Can add tylenol as needed.  Follow up with PCP - call tomorrow for appointment. Return to ED if condition does not improve or worsens

## 2022-05-03 ENCOUNTER — TELEMEDICINE (OUTPATIENT)
Dept: FAMILY MEDICINE CLINIC | Age: 32
End: 2022-05-03
Payer: MEDICAID

## 2022-05-03 DIAGNOSIS — R03.0 ELEVATED BLOOD PRESSURE READING WITHOUT DIAGNOSIS OF HYPERTENSION: ICD-10-CM

## 2022-05-03 DIAGNOSIS — A74.9 CHLAMYDIA: ICD-10-CM

## 2022-05-03 DIAGNOSIS — K80.20 GALLSTONES: Primary | ICD-10-CM

## 2022-05-03 DIAGNOSIS — F17.200 TOBACCO DEPENDENCE: ICD-10-CM

## 2022-05-03 DIAGNOSIS — E55.9 VITAMIN D DEFICIENCY: ICD-10-CM

## 2022-05-03 DIAGNOSIS — F32.A ANXIETY AND DEPRESSION: ICD-10-CM

## 2022-05-03 DIAGNOSIS — F41.9 ANXIETY AND DEPRESSION: ICD-10-CM

## 2022-05-03 DIAGNOSIS — Z51.81 MEDICATION MONITORING ENCOUNTER: ICD-10-CM

## 2022-05-03 DIAGNOSIS — Z11.59 ENCOUNTER FOR HEPATITIS C SCREENING TEST FOR LOW RISK PATIENT: ICD-10-CM

## 2022-05-03 DIAGNOSIS — A59.9 TRICHOMONIASIS: ICD-10-CM

## 2022-05-03 DIAGNOSIS — Z11.4 SCREENING FOR HIV (HUMAN IMMUNODEFICIENCY VIRUS): ICD-10-CM

## 2022-05-03 PROBLEM — R45.851 SUICIDAL IDEATION: Status: RESOLVED | Noted: 2019-06-06 | Resolved: 2022-05-03

## 2022-05-03 LAB
ALBUMIN SERPL-MCNC: 4.5 G/DL (ref 3.5–5.2)
ALP BLD-CCNC: 73 U/L (ref 35–104)
ALT SERPL-CCNC: 26 U/L (ref 5–33)
ANION GAP SERPL CALCULATED.3IONS-SCNC: 14 MMOL/L (ref 7–19)
AST SERPL-CCNC: 25 U/L (ref 5–32)
BASOPHILS ABSOLUTE: 0.1 K/UL (ref 0–0.2)
BASOPHILS RELATIVE PERCENT: 0.5 % (ref 0–1)
BILIRUB SERPL-MCNC: <0.2 MG/DL (ref 0.2–1.2)
BUN BLDV-MCNC: 9 MG/DL (ref 6–20)
CALCIUM SERPL-MCNC: 9.7 MG/DL (ref 8.6–10)
CHLORIDE BLD-SCNC: 104 MMOL/L (ref 98–111)
CO2: 22 MMOL/L (ref 22–29)
CREAT SERPL-MCNC: 0.9 MG/DL (ref 0.5–0.9)
EOSINOPHILS ABSOLUTE: 0.2 K/UL (ref 0–0.6)
EOSINOPHILS RELATIVE PERCENT: 2.4 % (ref 0–5)
GFR AFRICAN AMERICAN: >59
GFR NON-AFRICAN AMERICAN: >60
GLUCOSE BLD-MCNC: 85 MG/DL (ref 74–109)
HCT VFR BLD CALC: 38.7 % (ref 37–47)
HEMOGLOBIN: 12.5 G/DL (ref 12–16)
HEPATITIS C ANTIBODY INTERPRETATION: NORMAL
HIV-1 P24 AG: NORMAL
IMMATURE GRANULOCYTES #: 0 K/UL
LYMPHOCYTES ABSOLUTE: 2.7 K/UL (ref 1.1–4.5)
LYMPHOCYTES RELATIVE PERCENT: 28 % (ref 20–40)
MCH RBC QN AUTO: 29.3 PG (ref 27–31)
MCHC RBC AUTO-ENTMCNC: 32.3 G/DL (ref 33–37)
MCV RBC AUTO: 90.8 FL (ref 81–99)
MONOCYTES ABSOLUTE: 0.6 K/UL (ref 0–0.9)
MONOCYTES RELATIVE PERCENT: 6.1 % (ref 0–10)
NEUTROPHILS ABSOLUTE: 6.1 K/UL (ref 1.5–7.5)
NEUTROPHILS RELATIVE PERCENT: 62.7 % (ref 50–65)
PDW BLD-RTO: 13.6 % (ref 11.5–14.5)
PLATELET # BLD: 299 K/UL (ref 130–400)
PMV BLD AUTO: 11 FL (ref 9.4–12.3)
POTASSIUM SERPL-SCNC: 4.7 MMOL/L (ref 3.5–5)
RAPID HIV 1&2: NORMAL
RBC # BLD: 4.26 M/UL (ref 4.2–5.4)
SODIUM BLD-SCNC: 140 MMOL/L (ref 136–145)
TOTAL PROTEIN: 7.4 G/DL (ref 6.6–8.7)
VITAMIN D 25-HYDROXY: 27.3 NG/ML
WBC # BLD: 9.7 K/UL (ref 4.8–10.8)

## 2022-05-03 PROCEDURE — 99406 BEHAV CHNG SMOKING 3-10 MIN: CPT | Performed by: FAMILY MEDICINE

## 2022-05-03 PROCEDURE — 99204 OFFICE O/P NEW MOD 45 MIN: CPT | Performed by: FAMILY MEDICINE

## 2022-05-03 RX ORDER — FLUOXETINE HYDROCHLORIDE 20 MG/1
20 CAPSULE ORAL DAILY
Qty: 30 CAPSULE | Refills: 1 | Status: SHIPPED | OUTPATIENT
Start: 2022-05-03 | End: 2022-07-14

## 2022-05-03 RX ORDER — AZITHROMYCIN 1 G
1 PACKET (EA) ORAL ONCE
Qty: 1 EACH | Refills: 0 | Status: SHIPPED | OUTPATIENT
Start: 2022-05-03 | End: 2022-05-03

## 2022-05-03 RX ORDER — BLOOD PRESSURE TEST KIT
1 KIT MISCELLANEOUS DAILY
Qty: 1 KIT | Refills: 0 | Status: SHIPPED | OUTPATIENT
Start: 2022-05-03 | End: 2022-06-09

## 2022-05-03 RX ORDER — METRONIDAZOLE 500 MG/1
500 TABLET ORAL 3 TIMES DAILY
Qty: 30 TABLET | Refills: 0 | Status: SHIPPED | OUTPATIENT
Start: 2022-05-03 | End: 2022-05-13

## 2022-05-03 SDOH — HEALTH STABILITY: PHYSICAL HEALTH: ON AVERAGE, HOW MANY MINUTES DO YOU ENGAGE IN EXERCISE AT THIS LEVEL?: 20 MIN

## 2022-05-03 SDOH — HEALTH STABILITY: PHYSICAL HEALTH: ON AVERAGE, HOW MANY DAYS PER WEEK DO YOU ENGAGE IN MODERATE TO STRENUOUS EXERCISE (LIKE A BRISK WALK)?: 6 DAYS

## 2022-05-03 ASSESSMENT — ANXIETY QUESTIONNAIRES
5. BEING SO RESTLESS THAT IT IS HARD TO SIT STILL: 2-OVER HALF THE DAYS
4. TROUBLE RELAXING: 3-NEARLY EVERY DAY
2. NOT BEING ABLE TO STOP OR CONTROL WORRYING: 3-NEARLY EVERY DAY
7. FEELING AFRAID AS IF SOMETHING AWFUL MIGHT HAPPEN: 3-NEARLY EVERY DAY
6. BECOMING EASILY ANNOYED OR IRRITABLE: 3-NEARLY EVERY DAY
GAD7 TOTAL SCORE: 19
1. FEELING NERVOUS, ANXIOUS, OR ON EDGE: 2
3. WORRYING TOO MUCH ABOUT DIFFERENT THINGS: 3-NEARLY EVERY DAY

## 2022-05-03 ASSESSMENT — ENCOUNTER SYMPTOMS
APNEA: 0
STRIDOR: 0
VOMITING: 0
FACIAL SWELLING: 0
EYE DISCHARGE: 0
NAUSEA: 0
COLOR CHANGE: 0
BACK PAIN: 0
EYE ITCHING: 0

## 2022-05-03 ASSESSMENT — PATIENT HEALTH QUESTIONNAIRE - PHQ9
SUM OF ALL RESPONSES TO PHQ QUESTIONS 1-9: 0
SUM OF ALL RESPONSES TO PHQ9 QUESTIONS 1 & 2: 0
SUM OF ALL RESPONSES TO PHQ QUESTIONS 1-9: 0
1. LITTLE INTEREST OR PLEASURE IN DOING THINGS: 0
2. FEELING DOWN, DEPRESSED OR HOPELESS: 0

## 2022-05-03 NOTE — PROGRESS NOTES
Say Treviño (:  1990) is a New patient, here for evaluation of the following:    Assessment & Plan   Below is the assessment and plan developed based on review of pertinent history, physical exam, labs, studies, and medications. 1. Gallstones  -     Lavon Perez PA-C, General Surgery, [place]  2. Medication monitoring encounter  -     CBC with Auto Differential; Future  -     Comprehensive Metabolic Panel; Future  3. Encounter for hepatitis C screening test for low risk patient  -     Hepatitis C Antibody; Future  4. Screening for HIV (human immunodeficiency virus)  -     HIV Rapid 1&2; Future  5. Anxiety and depression  -     FLUoxetine (PROZAC) 20 MG capsule; Take 1 capsule by mouth daily, Disp-30 capsule, R-1Normal  6. Tobacco dependence  7. Vitamin D deficiency  -     Vitamin D 25 Hydroxy; Future  8. Chlamydia  -     azithromycin (ZITHROMAX) 1 g powder; Take 1 Package by mouth once for 1 dose, Disp-1 each, R-0Normal  9. Trichomoniasis  -     metroNIDAZOLE (FLAGYL) 500 MG tablet; Take 1 tablet by mouth 3 times daily for 10 days, Disp-30 tablet, R-0Normal  10. Elevated blood pressure reading without diagnosis of hypertension  -     Blood Pressure KIT; DAILY Starting Tue 5/3/2022, Disp-1 kit, R-0, Normal    Tobacco Use:  Spent 5 minutes discussing smoking cessation, separate of total office visit time documented elsewhere. Discussed health issues attributed to tobacco use. Discussed medication options including nicotine replacement, Wellbutrin, and Chantix. Discussed calling [phone] for further assistance. Recommended picking a quit date. Will discuss further at next appointment. Return in about 1 month (around 6/3/2022) for bp anxiety and lab results, in office. Subjective   HPI   Patient presents establish care, ED follow-up. Chart review shows that she was seen in [facility] ED on  with main concerns abdominal pain and leg pain.   The note is still pending at this time, however reviewed her CAT scan, labs and STD results. CAT scan was significant for multiple gallstones, her labs were unremarkable though possible UA, and additional testing came back positive for chlamydia and trichomonas. She was prescribed Toradol, Bactrim, and then Z-Michel 1 dose and Flagyl. She states she was not able to fill the latter 2 medications, due to unavailability at the pharmacy, is requesting those prescription to be sent to [facility]. She agreeable to see general surgery, [facility]. She asked was causing abdominal pain, which is primarily lower abdominal pain/back pain. Advised is likely multifactorial, however important to treat Trich and Chla as they are most likely cause, Abx sent as requested. Her other main concern today is anxiety. She was previously on trazodone, Prozac and Lamictal that was prescribed by psychiatry when she was admitted in 2019 for depression/SI. She followed for oversight, but was not able to get the medications refilled. PHQ 2 negative, and she feels like her anxiety is worse than depression. She would like to restart the Prozac if possible. Also she has questionable blood pressure. She states when seen in the urgent care etc. her blood pressure is usually elevated. Her most recent blood pressure in the ED was 137/71. 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 Will prescribe blood pressure machine so she can check it on a regular basis, review labs and bp readings. PHQ Scores 5/3/2022   PHQ2 Score 0   PHQ9 Score 0     Interpretation of Total Score Depression Severity: 1-4 = Minimal depression, 5-9 = Mild depression, 10-14 = Moderate depression, 15-19 = Moderately severe depression, 20-27 = Severe depression    Review of Systems   Constitutional: Negative for chills and fever. HENT: Negative for facial swelling and mouth sores. Eyes: Negative for discharge and itching.    Respiratory: Negative for apnea and stridor. Cardiovascular: Negative for chest pain and palpitations. Gastrointestinal: Negative for nausea and vomiting. Endocrine: Negative for cold intolerance and heat intolerance. Genitourinary: Negative for frequency and urgency. Musculoskeletal: Negative for arthralgias and back pain. Skin: Negative for color change and rash. Objective   Patient-Reported Vitals  Patient-Reported Systolic (Top): 556 mmHg (ER)  Patient-Reported Diastolic (Bottom): 71 mmHg  BP Observations: Yes, BP was taken on electronic monitoring device with digital readout       Physical Exam             Feng Garcia, was evaluated through a synchronous (real-time) audio-video encounter. The patient (or guardian if applicable) is aware that this is a billable service, which includes applicable co-pays. This Virtual Visit was conducted with patient's (and/or legal guardian's) consent. The visit was conducted pursuant to the emergency declaration under the [address] [place] [address] authority and the [employer] General Act. Patient identification was verified, and a caregiver was present when appropriate. The patient was located at home in a state where the provider was licensed to provide care.        --Oneida Franz MD

## 2022-05-05 ENCOUNTER — OFFICE VISIT (OUTPATIENT)
Dept: SURGERY | Age: 32
End: 2022-05-05
Payer: MEDICAID

## 2022-05-05 VITALS
HEIGHT: 67 IN | BODY MASS INDEX: 38.14 KG/M2 | DIASTOLIC BLOOD PRESSURE: 86 MMHG | WEIGHT: 243 LBS | TEMPERATURE: 97.4 F | HEART RATE: 92 BPM | SYSTOLIC BLOOD PRESSURE: 132 MMHG

## 2022-05-05 DIAGNOSIS — K80.00 CALCULUS OF GALLBLADDER WITH ACUTE CHOLECYSTITIS WITHOUT OBSTRUCTION: Primary | ICD-10-CM

## 2022-05-05 PROCEDURE — 99203 OFFICE O/P NEW LOW 30 MIN: CPT | Performed by: PHYSICIAN ASSISTANT

## 2022-05-05 NOTE — PROGRESS NOTES
HISTORY OF PRESENT ILLNESS:  Patria Morley comes today to discuss possible gallbladder surgery. She has had worsening midepigastric right upper quadrant abdominal pain. This is worse after eating. Symptoms have been mild to moderate. She sought care at [facility] emergency department. An ultrasound there revealed multiple gallstones with no gallbladder wall thickening. She had mild fatty liver. She denies fevers chills or jaundice. Patient Active Problem List    Diagnosis Date Noted    Tobacco dependence 2022    Elevated blood pressure reading without diagnosis of hypertension 2022    Gallstones 2022    Mild episode of recurrent major depressive disorder ([facility] [address] 2019    Weakness 2018    Numbness 2018    Anxiety and depression 2018    Gait abnormality 2018     Current Outpatient Medications   Medication Sig Dispense Refill    FLUoxetine (PROZAC) 20 MG capsule Take 1 capsule by mouth daily 30 capsule 1    metroNIDAZOLE (FLAGYL) 500 MG tablet Take 1 tablet by mouth 3 times daily for 10 days 30 tablet 0    Blood Pressure KIT 1 each by Does not apply route daily 1 kit 0    ketorolac (TORADOL) 10 MG tablet Take 10 mg by mouth every 6 hours as needed      sulfamethoxazole-trimethoprim (BACTRIM DS;SEPTRA DS) 800-160 MG per tablet Take 1 tablet by mouth 2 times daily       No current facility-administered medications for this visit.      Allergies: Penicillins  Past Medical History:   Diagnosis Date    Anxiety     Depression, acute 2019    Hypertension     Tobacco dependence 5/3/2022     Past Surgical History:   Procedure Laterality Date    APPENDECTOMY       SECTION      TUBAL LIGATION       Family History   Problem Relation Age of Onset    Depression Mother     Heart Attack Mother     High Blood Pressure Mother    [de-identified] / [place] Mother      Social History     Tobacco Use    Smoking status: Current Every Day Smoker     Packs/day: 0.50     Years: 10.00     Pack years: 5.00     Types: Cigarettes, E-Cigarettes    Smokeless tobacco: Never Used   Substance Use Topics    Alcohol use: Yes     Alcohol/week: 8.0 standard drinks     Types: 8 Cans of beer per week       Review of Systems  Reviewed and positive for the above. All other systems noted to be negative. Physical Exam  Blood pressure 132/86, pulse 92, temperature 97.4 °F (36.3 °C), temperature source Temporal, height 5' 7\" (1.702 m), weight 243 lb (110.2 kg), last menstrual period 04/27/2022. GENERAL:  Reveals a 36 y.o. female that  appears to be in no acute distress. HEENT:  Head is normocephalic and atraumatic. NECK:  Neck is supple without masses or carotid bruits. No obvious thyromegaly is grossly noted. CHEST:  Patient has normal respiratory effort. Chest is clear bilaterally with good thoracic expansion. HEART:  Heart demonstrated a regular rhythm and rate with no cardiac murmurs, gallops or rubs noted to auscultation. ABDOMEN:  Inspection of the abdomen demonstrated the patient to have normal bowel sounds present in all four quadrants. The abdomen is soft and nontender with no hepatosplenomegaly, abdominal hernias or abdominal bruits. No costovertebral tenderness is noted to percussion bilaterally. EXTREMITIES:  Extremities demonstrated no cyanosis or pitting edema bilaterally. PSYCHIATRIC:  Patient is oriented to time, place and person. The patient's mood and affect are normal.      IMPRESSION:  Chronic Cholecystitis with Cholelithiasis  BMI 38.06    PLAN:  The risks, benefits, and options scopic/robotic cholecystectomy was discussed with the patient. She  is willing to proceed with surgery.

## 2022-05-05 NOTE — H&P (VIEW-ONLY)
HISTORY OF PRESENT ILLNESS:  Naren Henry comes today to discuss possible gallbladder surgery. She has had worsening midepigastric right upper quadrant abdominal pain. This is worse after eating. Symptoms have been mild to moderate. She sought care at [facility] emergency department. An ultrasound there revealed multiple gallstones with no gallbladder wall thickening. She had mild fatty liver. She denies fevers chills or jaundice. Patient Active Problem List    Diagnosis Date Noted    Tobacco dependence 2022    Elevated blood pressure reading without diagnosis of hypertension 2022    Gallstones 2022    Mild episode of recurrent major depressive disorder ([facility] [address] 2019    Weakness 2018    Numbness 2018    Anxiety and depression 2018    Gait abnormality 2018     Current Outpatient Medications   Medication Sig Dispense Refill    FLUoxetine (PROZAC) 20 MG capsule Take 1 capsule by mouth daily 30 capsule 1    metroNIDAZOLE (FLAGYL) 500 MG tablet Take 1 tablet by mouth 3 times daily for 10 days 30 tablet 0    Blood Pressure KIT 1 each by Does not apply route daily 1 kit 0    ketorolac (TORADOL) 10 MG tablet Take 10 mg by mouth every 6 hours as needed      sulfamethoxazole-trimethoprim (BACTRIM DS;SEPTRA DS) 800-160 MG per tablet Take 1 tablet by mouth 2 times daily       No current facility-administered medications for this visit.      Allergies: Penicillins  Past Medical History:   Diagnosis Date    Anxiety     Depression, acute 2019    Hypertension     Tobacco dependence 5/3/2022     Past Surgical History:   Procedure Laterality Date    APPENDECTOMY       SECTION      TUBAL LIGATION       Family History   Problem Relation Age of Onset    Depression Mother     Heart Attack Mother     High Blood Pressure Mother    [de-identified] / [place] Mother      Social History     Tobacco Use    Smoking status: Current Every Day Smoker     Packs/day: 0.50     Years: 10.00     Pack years: 5.00     Types: Cigarettes, E-Cigarettes    Smokeless tobacco: Never Used   Substance Use Topics    Alcohol use: Yes     Alcohol/week: 8.0 standard drinks     Types: 8 Cans of beer per week       Review of Systems  Reviewed and positive for the above. All other systems noted to be negative. Physical Exam  Blood pressure 132/86, pulse 92, temperature 97.4 °F (36.3 °C), temperature source Temporal, height 5' 7\" (1.702 m), weight 243 lb (110.2 kg), last menstrual period 04/27/2022. GENERAL:  Reveals a 36 y.o. female that  appears to be in no acute distress. HEENT:  Head is normocephalic and atraumatic. NECK:  Neck is supple without masses or carotid bruits. No obvious thyromegaly is grossly noted. CHEST:  Patient has normal respiratory effort. Chest is clear bilaterally with good thoracic expansion. HEART:  Heart demonstrated a regular rhythm and rate with no cardiac murmurs, gallops or rubs noted to auscultation. ABDOMEN:  Inspection of the abdomen demonstrated the patient to have normal bowel sounds present in all four quadrants. The abdomen is soft and nontender with no hepatosplenomegaly, abdominal hernias or abdominal bruits. No costovertebral tenderness is noted to percussion bilaterally. EXTREMITIES:  Extremities demonstrated no cyanosis or pitting edema bilaterally. PSYCHIATRIC:  Patient is oriented to time, place and person. The patient's mood and affect are normal.      IMPRESSION:  Chronic Cholecystitis with Cholelithiasis  BMI 38.06    PLAN:  The risks, benefits, and options scopic/robotic cholecystectomy was discussed with the patient. She  is willing to proceed with surgery.

## 2022-05-05 NOTE — LETTER
Preop Orders:     Patient: Eduardo Araya  : 1990    Hospital:  [address] [place] [address]     Admitting Physician:  ALBERTO    Diagnosis: colecystitis with cholelithiasis    Procedure: robotic cholecystectomy     Anesthesia: General    Admission:Outpatient    Date:first available    Labs:per anesthesia, labs from recent ED visit under care everywhere    Pre-Op Meds:  Clindamycin 900mg IV    Latex Allergy: no    Beta Blocker: no    Medication Instructions: No plavix for 2 weeks prior to procedure; no asprin for 3 days prior to procedure    This has been electronically signed by Vangie Vasquez M.D.

## 2022-05-06 ENCOUNTER — TELEPHONE (OUTPATIENT)
Dept: SURGERY | Age: 32
End: 2022-05-06

## 2022-05-13 ENCOUNTER — HOSPITAL ENCOUNTER (OUTPATIENT)
Dept: PREADMISSION TESTING | Age: 32
Discharge: HOME | End: 2022-05-17
Payer: MEDICAID

## 2022-05-13 VITALS — WEIGHT: 242 LBS | BODY MASS INDEX: 37.9 KG/M2

## 2022-05-13 LAB
EKG P AXIS: 25 DEGREES
EKG P-R INTERVAL: 184 MS
EKG Q-T INTERVAL: 376 MS
EKG QRS DURATION: 88 MS
EKG QTC CALCULATION (BAZETT): 388 MS
EKG T AXIS: 25 DEGREES

## 2022-05-13 PROCEDURE — 93005 ELECTROCARDIOGRAM TRACING: CPT | Performed by: SURGERY

## 2022-05-13 RX ORDER — CLINDAMYCIN PHOSPHATE 900 MG/50ML
900 INJECTION INTRAVENOUS ONCE
Status: CANCELLED | OUTPATIENT
Start: 2022-05-18

## 2022-05-18 ENCOUNTER — HOSPITAL ENCOUNTER (OUTPATIENT)
Age: 32
Setting detail: OUTPATIENT SURGERY
Discharge: HOME | End: 2022-05-18
Attending: SURGERY | Admitting: SURGERY
Payer: MEDICAID

## 2022-05-18 ENCOUNTER — ANESTHESIA (OUTPATIENT)
Dept: OPERATING ROOM | Age: 32
End: 2022-05-18
Payer: MEDICAID

## 2022-05-18 ENCOUNTER — ANESTHESIA EVENT (OUTPATIENT)
Dept: OPERATING ROOM | Age: 32
End: 2022-05-18
Payer: MEDICAID

## 2022-05-18 VITALS
SYSTOLIC BLOOD PRESSURE: 140 MMHG | BODY MASS INDEX: 37.98 KG/M2 | HEART RATE: 85 BPM | OXYGEN SATURATION: 98 % | DIASTOLIC BLOOD PRESSURE: 78 MMHG | RESPIRATION RATE: 16 BRPM | WEIGHT: 242 LBS | TEMPERATURE: 97 F | HEIGHT: 67 IN

## 2022-05-18 DIAGNOSIS — K80.20 GALLSTONES: Primary | ICD-10-CM

## 2022-05-18 LAB — HCG(URINE) PREGNANCY TEST: NEGATIVE

## 2022-05-18 PROCEDURE — 47562 LAPAROSCOPIC CHOLECYSTECTOMY: CPT | Performed by: SURGERY

## 2022-05-18 PROCEDURE — C9290 INJ, BUPIVACAINE LIPOSOME: HCPCS | Performed by: SURGERY

## 2022-05-18 PROCEDURE — 2709999900 HC NON-CHARGEABLE SUPPLY: Performed by: SURGERY

## 2022-05-18 PROCEDURE — 88304 TISSUE EXAM BY PATHOLOGIST: CPT

## 2022-05-18 PROCEDURE — 7100000000 HC PACU RECOVERY - FIRST 15 MIN: Performed by: SURGERY

## 2022-05-18 PROCEDURE — 3600000009 HC SURGERY ROBOT BASE: Performed by: SURGERY

## 2022-05-18 PROCEDURE — 3700000001 HC ADD 15 MINUTES (ANESTHESIA): Performed by: SURGERY

## 2022-05-18 PROCEDURE — 2500000003 HC RX 250 WO HCPCS

## 2022-05-18 PROCEDURE — S2900 ROBOTIC SURGICAL SYSTEM: HCPCS | Performed by: SURGERY

## 2022-05-18 PROCEDURE — 2500000003 HC RX 250 WO HCPCS: Performed by: SURGERY

## 2022-05-18 PROCEDURE — 2580000003 HC RX 258: Performed by: SURGERY

## 2022-05-18 PROCEDURE — 3700000000 HC ANESTHESIA ATTENDED CARE: Performed by: SURGERY

## 2022-05-18 PROCEDURE — 7100000010 HC PHASE II RECOVERY - FIRST 15 MIN: Performed by: SURGERY

## 2022-05-18 PROCEDURE — 3600000019 HC SURGERY ROBOT ADDTL 15MIN: Performed by: SURGERY

## 2022-05-18 PROCEDURE — 6370000000 HC RX 637 (ALT 250 FOR IP): Performed by: ANESTHESIOLOGY

## 2022-05-18 PROCEDURE — 7100000001 HC PACU RECOVERY - ADDTL 15 MIN: Performed by: SURGERY

## 2022-05-18 PROCEDURE — 6360000002 HC RX W HCPCS

## 2022-05-18 PROCEDURE — 84703 CHORIONIC GONADOTROPIN ASSAY: CPT

## 2022-05-18 PROCEDURE — 6360000002 HC RX W HCPCS: Performed by: SURGERY

## 2022-05-18 PROCEDURE — 7100000011 HC PHASE II RECOVERY - ADDTL 15 MIN: Performed by: SURGERY

## 2022-05-18 RX ORDER — MIDAZOLAM HYDROCHLORIDE 1 MG/ML
INJECTION INTRAMUSCULAR; INTRAVENOUS PRN
Status: DISCONTINUED | OUTPATIENT
Start: 2022-05-18 | End: 2022-05-18 | Stop reason: SDUPTHER

## 2022-05-18 RX ORDER — CLINDAMYCIN PHOSPHATE 900 MG/50ML
900 INJECTION INTRAVENOUS ONCE
Status: COMPLETED | OUTPATIENT
Start: 2022-05-18 | End: 2022-05-18

## 2022-05-18 RX ORDER — LIDOCAINE HYDROCHLORIDE 10 MG/ML
1 INJECTION, SOLUTION EPIDURAL; INFILTRATION; INTRACAUDAL; PERINEURAL
Status: DISCONTINUED | OUTPATIENT
Start: 2022-05-18 | End: 2022-05-18 | Stop reason: HOSPADM

## 2022-05-18 RX ORDER — HYDROMORPHONE HYDROCHLORIDE 1 MG/ML
0.25 INJECTION, SOLUTION INTRAMUSCULAR; INTRAVENOUS; SUBCUTANEOUS EVERY 5 MIN PRN
Status: DISCONTINUED | OUTPATIENT
Start: 2022-05-18 | End: 2022-05-18 | Stop reason: HOSPADM

## 2022-05-18 RX ORDER — SODIUM CHLORIDE, SODIUM LACTATE, POTASSIUM CHLORIDE, CALCIUM CHLORIDE 600; 310; 30; 20 MG/100ML; MG/100ML; MG/100ML; MG/100ML
INJECTION, SOLUTION INTRAVENOUS CONTINUOUS
Status: DISCONTINUED | OUTPATIENT
Start: 2022-05-18 | End: 2022-05-18 | Stop reason: HOSPADM

## 2022-05-18 RX ORDER — SODIUM CHLORIDE 0.9 % (FLUSH) 0.9 %
5-40 SYRINGE (ML) INJECTION PRN
Status: CANCELLED | OUTPATIENT
Start: 2022-05-18

## 2022-05-18 RX ORDER — SODIUM CHLORIDE 0.9 % (FLUSH) 0.9 %
5-40 SYRINGE (ML) INJECTION PRN
Status: DISCONTINUED | OUTPATIENT
Start: 2022-05-18 | End: 2022-05-18 | Stop reason: HOSPADM

## 2022-05-18 RX ORDER — ROCURONIUM BROMIDE 10 MG/ML
INJECTION, SOLUTION INTRAVENOUS PRN
Status: DISCONTINUED | OUTPATIENT
Start: 2022-05-18 | End: 2022-05-18 | Stop reason: SDUPTHER

## 2022-05-18 RX ORDER — ONDANSETRON 2 MG/ML
4 INJECTION INTRAMUSCULAR; INTRAVENOUS EVERY 6 HOURS PRN
Status: CANCELLED | OUTPATIENT
Start: 2022-05-18

## 2022-05-18 RX ORDER — HYDROCODONE BITARTRATE AND ACETAMINOPHEN 5; 325 MG/1; MG/1
1 TABLET ORAL EVERY 4 HOURS PRN
Status: DISCONTINUED | OUTPATIENT
Start: 2022-05-18 | End: 2022-05-18 | Stop reason: HOSPADM

## 2022-05-18 RX ORDER — FAMOTIDINE 20 MG/1
20 TABLET, FILM COATED ORAL ONCE
Status: COMPLETED | OUTPATIENT
Start: 2022-05-18 | End: 2022-05-18

## 2022-05-18 RX ORDER — SODIUM CHLORIDE 9 MG/ML
INJECTION, SOLUTION INTRAVENOUS PRN
Status: CANCELLED | OUTPATIENT
Start: 2022-05-18

## 2022-05-18 RX ORDER — HYDROCODONE BITARTRATE AND ACETAMINOPHEN 5; 325 MG/1; MG/1
2 TABLET ORAL EVERY 4 HOURS PRN
Status: DISCONTINUED | OUTPATIENT
Start: 2022-05-18 | End: 2022-05-18 | Stop reason: HOSPADM

## 2022-05-18 RX ORDER — MORPHINE SULFATE 4 MG/ML
2 INJECTION, SOLUTION INTRAMUSCULAR; INTRAVENOUS
Status: DISCONTINUED | OUTPATIENT
Start: 2022-05-18 | End: 2022-05-18 | Stop reason: HOSPADM

## 2022-05-18 RX ORDER — HYDROCODONE BITARTRATE AND ACETAMINOPHEN 5; 325 MG/1; MG/1
2 TABLET ORAL EVERY 6 HOURS PRN
Qty: 22 TABLET | Refills: 0 | Status: SHIPPED | OUTPATIENT
Start: 2022-05-18 | End: 2022-05-23

## 2022-05-18 RX ORDER — SCOLOPAMINE TRANSDERMAL SYSTEM 1 MG/1
1 PATCH, EXTENDED RELEASE TRANSDERMAL
Status: DISCONTINUED | OUTPATIENT
Start: 2022-05-18 | End: 2022-05-18 | Stop reason: HOSPADM

## 2022-05-18 RX ORDER — EPHEDRINE SULFATE 50 MG/ML
INJECTION, SOLUTION INTRAVENOUS PRN
Status: DISCONTINUED | OUTPATIENT
Start: 2022-05-18 | End: 2022-05-18 | Stop reason: SDUPTHER

## 2022-05-18 RX ORDER — FENTANYL CITRATE 50 UG/ML
INJECTION, SOLUTION INTRAMUSCULAR; INTRAVENOUS PRN
Status: DISCONTINUED | OUTPATIENT
Start: 2022-05-18 | End: 2022-05-18 | Stop reason: SDUPTHER

## 2022-05-18 RX ORDER — SODIUM CHLORIDE 0.9 % (FLUSH) 0.9 %
5-40 SYRINGE (ML) INJECTION EVERY 12 HOURS SCHEDULED
Status: DISCONTINUED | OUTPATIENT
Start: 2022-05-18 | End: 2022-05-18 | Stop reason: HOSPADM

## 2022-05-18 RX ORDER — ONDANSETRON 4 MG/1
4 TABLET, ORALLY DISINTEGRATING ORAL EVERY 8 HOURS PRN
Status: CANCELLED | OUTPATIENT
Start: 2022-05-18

## 2022-05-18 RX ORDER — MEPERIDINE HYDROCHLORIDE 25 MG/ML
12.5 INJECTION INTRAMUSCULAR; INTRAVENOUS; SUBCUTANEOUS EVERY 5 MIN PRN
Status: DISCONTINUED | OUTPATIENT
Start: 2022-05-18 | End: 2022-05-18 | Stop reason: HOSPADM

## 2022-05-18 RX ORDER — SODIUM CHLORIDE 0.9 % (FLUSH) 0.9 %
5-40 SYRINGE (ML) INJECTION EVERY 12 HOURS SCHEDULED
Status: CANCELLED | OUTPATIENT
Start: 2022-05-18

## 2022-05-18 RX ORDER — SODIUM CHLORIDE 9 MG/ML
INJECTION, SOLUTION INTRAVENOUS PRN
Status: DISCONTINUED | OUTPATIENT
Start: 2022-05-18 | End: 2022-05-18 | Stop reason: HOSPADM

## 2022-05-18 RX ORDER — LIDOCAINE HYDROCHLORIDE 10 MG/ML
INJECTION, SOLUTION EPIDURAL; INFILTRATION; INTRACAUDAL; PERINEURAL PRN
Status: DISCONTINUED | OUTPATIENT
Start: 2022-05-18 | End: 2022-05-18 | Stop reason: SDUPTHER

## 2022-05-18 RX ORDER — ONDANSETRON 4 MG/1
4 TABLET, ORALLY DISINTEGRATING ORAL EVERY 8 HOURS PRN
Qty: 10 TABLET | Refills: 2 | Status: SHIPPED | OUTPATIENT
Start: 2022-05-18 | End: 2022-10-09

## 2022-05-18 RX ORDER — HYDROMORPHONE HYDROCHLORIDE 1 MG/ML
0.5 INJECTION, SOLUTION INTRAMUSCULAR; INTRAVENOUS; SUBCUTANEOUS EVERY 5 MIN PRN
Status: DISCONTINUED | OUTPATIENT
Start: 2022-05-18 | End: 2022-05-18 | Stop reason: HOSPADM

## 2022-05-18 RX ORDER — MIDAZOLAM HYDROCHLORIDE 1 MG/ML
2 INJECTION INTRAMUSCULAR; INTRAVENOUS
Status: DISCONTINUED | OUTPATIENT
Start: 2022-05-18 | End: 2022-05-18 | Stop reason: HOSPADM

## 2022-05-18 RX ORDER — ONDANSETRON 2 MG/ML
INJECTION INTRAMUSCULAR; INTRAVENOUS PRN
Status: DISCONTINUED | OUTPATIENT
Start: 2022-05-18 | End: 2022-05-18

## 2022-05-18 RX ORDER — KETOROLAC TROMETHAMINE 30 MG/ML
INJECTION, SOLUTION INTRAMUSCULAR; INTRAVENOUS PRN
Status: DISCONTINUED | OUTPATIENT
Start: 2022-05-18 | End: 2022-05-18 | Stop reason: SDUPTHER

## 2022-05-18 RX ORDER — INDOCYANINE GREEN AND WATER 25 MG
KIT INJECTION PRN
Status: DISCONTINUED | OUTPATIENT
Start: 2022-05-18 | End: 2022-05-18 | Stop reason: ALTCHOICE

## 2022-05-18 RX ORDER — METOCLOPRAMIDE HYDROCHLORIDE 5 MG/ML
10 INJECTION INTRAMUSCULAR; INTRAVENOUS
Status: DISCONTINUED | OUTPATIENT
Start: 2022-05-18 | End: 2022-05-18 | Stop reason: HOSPADM

## 2022-05-18 RX ORDER — MORPHINE SULFATE 4 MG/ML
4 INJECTION, SOLUTION INTRAMUSCULAR; INTRAVENOUS
Status: DISCONTINUED | OUTPATIENT
Start: 2022-05-18 | End: 2022-05-18 | Stop reason: HOSPADM

## 2022-05-18 RX ORDER — DEXAMETHASONE SODIUM PHOSPHATE 10 MG/ML
INJECTION, SOLUTION INTRAMUSCULAR; INTRAVENOUS PRN
Status: DISCONTINUED | OUTPATIENT
Start: 2022-05-18 | End: 2022-05-18 | Stop reason: SDUPTHER

## 2022-05-18 RX ORDER — PROPOFOL 10 MG/ML
INJECTION, EMULSION INTRAVENOUS PRN
Status: DISCONTINUED | OUTPATIENT
Start: 2022-05-18 | End: 2022-05-18 | Stop reason: SDUPTHER

## 2022-05-18 RX ORDER — DOCUSATE SODIUM 100 MG/1
100 CAPSULE, LIQUID FILLED ORAL 2 TIMES DAILY
Qty: 60 CAPSULE | Refills: 0 | Status: SHIPPED | OUTPATIENT
Start: 2022-05-18 | End: 2022-06-17

## 2022-05-18 RX ORDER — DIPHENHYDRAMINE HYDROCHLORIDE 50 MG/ML
12.5 INJECTION INTRAMUSCULAR; INTRAVENOUS
Status: DISCONTINUED | OUTPATIENT
Start: 2022-05-18 | End: 2022-05-18 | Stop reason: HOSPADM

## 2022-05-18 RX ORDER — BUPIVACAINE HYDROCHLORIDE AND EPINEPHRINE 2.5; 5 MG/ML; UG/ML
INJECTION, SOLUTION EPIDURAL; INFILTRATION; INTRACAUDAL; PERINEURAL PRN
Status: DISCONTINUED | OUTPATIENT
Start: 2022-05-18 | End: 2022-05-18 | Stop reason: ALTCHOICE

## 2022-05-18 RX ADMIN — CLINDAMYCIN IN 5 PERCENT DEXTROSE 900 MG: 18 INJECTION, SOLUTION INTRAVENOUS at 08:03

## 2022-05-18 RX ADMIN — EPHEDRINE SULFATE 5 MG: 50 INJECTION INTRAMUSCULAR; INTRAVENOUS; SUBCUTANEOUS at 08:13

## 2022-05-18 RX ADMIN — ONDANSETRON 4 MG: 2 INJECTION INTRAMUSCULAR; INTRAVENOUS at 08:05

## 2022-05-18 RX ADMIN — SUGAMMADEX 300 MG: 100 INJECTION, SOLUTION INTRAVENOUS at 09:08

## 2022-05-18 RX ADMIN — HYDROMORPHONE HYDROCHLORIDE 0.5 MG: 1 INJECTION, SOLUTION INTRAMUSCULAR; INTRAVENOUS; SUBCUTANEOUS at 09:20

## 2022-05-18 RX ADMIN — FAMOTIDINE 20 MG: 20 TABLET ORAL at 07:04

## 2022-05-18 RX ADMIN — ROCURONIUM BROMIDE 50 MG: 10 INJECTION, SOLUTION INTRAVENOUS at 07:52

## 2022-05-18 RX ADMIN — MEPERIDINE HYDROCHLORIDE 12.5 MG: 25 INJECTION, SOLUTION INTRAMUSCULAR; INTRAVENOUS; SUBCUTANEOUS at 09:49

## 2022-05-18 RX ADMIN — MIDAZOLAM 2 MG: 1 INJECTION INTRAMUSCULAR; INTRAVENOUS at 07:45

## 2022-05-18 RX ADMIN — HYDROMORPHONE HYDROCHLORIDE 0.5 MG: 1 INJECTION, SOLUTION INTRAMUSCULAR; INTRAVENOUS; SUBCUTANEOUS at 08:08

## 2022-05-18 RX ADMIN — PROPOFOL 150 MG: 10 INJECTION, EMULSION INTRAVENOUS at 07:52

## 2022-05-18 RX ADMIN — FENTANYL CITRATE 50 MCG: 50 INJECTION, SOLUTION INTRAMUSCULAR; INTRAVENOUS at 09:10

## 2022-05-18 RX ADMIN — KETOROLAC TROMETHAMINE 30 MG: 30 INJECTION, SOLUTION INTRAMUSCULAR; INTRAVENOUS at 08:58

## 2022-05-18 RX ADMIN — LIDOCAINE HYDROCHLORIDE 50 MG: 10 INJECTION, SOLUTION EPIDURAL; INFILTRATION; INTRACAUDAL; PERINEURAL at 07:52

## 2022-05-18 RX ADMIN — FENTANYL CITRATE 100 MCG: 50 INJECTION, SOLUTION INTRAMUSCULAR; INTRAVENOUS at 07:52

## 2022-05-18 RX ADMIN — DEXAMETHASONE SODIUM PHOSPHATE 10 MG: 10 INJECTION, SOLUTION INTRAMUSCULAR; INTRAVENOUS at 08:05

## 2022-05-18 RX ADMIN — SODIUM CHLORIDE, POTASSIUM CHLORIDE, SODIUM LACTATE AND CALCIUM CHLORIDE: 600; 310; 30; 20 INJECTION, SOLUTION INTRAVENOUS at 07:00

## 2022-05-18 ASSESSMENT — PAIN DESCRIPTION - DESCRIPTORS
DESCRIPTORS: ACHING
DESCRIPTORS: ACHING

## 2022-05-18 ASSESSMENT — PAIN DESCRIPTION - PAIN TYPE
TYPE: SURGICAL PAIN
TYPE: SURGICAL PAIN

## 2022-05-18 ASSESSMENT — PAIN SCALES - GENERAL
PAINLEVEL_OUTOF10: 4
PAINLEVEL_OUTOF10: 4
PAINLEVEL_OUTOF10: 3

## 2022-05-18 ASSESSMENT — PAIN DESCRIPTION - LOCATION
LOCATION: ABDOMEN
LOCATION: ABDOMEN

## 2022-05-18 ASSESSMENT — PAIN DESCRIPTION - FREQUENCY
FREQUENCY: INTERMITTENT
FREQUENCY: CONTINUOUS

## 2022-05-18 ASSESSMENT — PAIN DESCRIPTION - ORIENTATION
ORIENTATION: MID
ORIENTATION: MID

## 2022-05-18 ASSESSMENT — LIFESTYLE VARIABLES: SMOKING_STATUS: 1

## 2022-05-18 ASSESSMENT — PAIN - FUNCTIONAL ASSESSMENT
PAIN_FUNCTIONAL_ASSESSMENT: ACTIVITIES ARE NOT PREVENTED
PAIN_FUNCTIONAL_ASSESSMENT: ACTIVITIES ARE NOT PREVENTED

## 2022-05-18 NOTE — OP NOTE
OPERATIVE REPORT    [facility]  [facility] REC[facility] NO. [id number]  SURGEON: Kashmir Rubi MD MD   Primary Care Physician: Joaquin Rios MD  Date: 5/18/2022    PREOPERATIVE DIAGNOSIS  Symptomatic Cholelithiasis    POSTOPERATIVE DIAGNOSIS  Same, with chronic cholecystitis    PROCEDURE  Robotic Assisted Laparoscopic cholecystectomy with Firefly (ICG)    ASSISTANT  Veronica Iliana    Anesthesia: GEA    EBL: Minimal    Findings: chronic inflammation     INDICATIONS  The patient presented with a complaint of RUQ abdominal pain. US was consistent with gallstones. PROCEDURE  After informed consent was obtained, the patient was taken to the operating room and placed in supine position. After the induction of adequate general endotracheal anesthesia the abdomen was prepped in the usual sterile fashion. Time out performed. Local anesthestic was administered to the inferior portion of the umbilicus, an incision was made, the base of the umbilicus was elevated, and the veress needle was inserted. The abdominal cavity was insufflated and the 8 mm port was inserted. The laparoscope was advanced and inspection undertaken. There was no evidence of trauma from the trocar insertion. Eight millimeter working ports were placed in line with the umbilicus, two ports on the patient's right and one on the left side. The patient was placed in reverse trendelenberg position and rotated to the left. The [address] operating system was brought into the field and docked. Working instruments were advanced and the fundus of the gallbladder was grasped and elevated. The peritoneum over the neck of the gallbladder was incised and dissection was begun. Firefly vision was used to visualize the cystic duct and biliary anatomy. The neck of the gallbladder was dissected with blunt and electorcautery to visualize the cystic duct. This was cleared of  surrounding tissue.  Adjacent to this the cystic artery was identified and also cleared of surrounding tissue. Dissection was carried up the cystic plate. A critical view of safety was obtained. The cystic duct was clipped proximally and distally with hemoclips and divided. The cystic artery was divided in a similar fashion. The gallbladder was then released from the undersurface of the liver using cautery. Once free, it was placed it in an Endocatch retrieval bag and removed through the umbilical incision. The area was irrigated and suctioned as needed. The clips were inspected, and were in good position with good hemostasis and no evidence of bile leak. The fascia at the umbilical site was closed using a 0 vicryl and the fascial closure device. The working ports were removed under vision with no bleeding noted. The pneumoperitoneum was released. Skin incisions were closed with interrupted 4-0 Monocryl subcuticular suture followed by Dermabond dressing. Ms. Iris Begum tolerated her surgery well and she was taken to PACU in  satisfactory condition.         Peter Abernathy MD  Electronically signed 5/18/2022 at 9:12 AM

## 2022-05-18 NOTE — DISCHARGE INSTR - ACTIVITY
Activity as tolerated except no lifting more than 10 pounds for the first week and no driving if taking pain medication. Okay to shower over incisions, but do not submerge under water like a tub or pool for one week. Watch for redness, drainage, fever, or worsening abdominal pain, and call for any questions or concerns. Take stool softener as directed to try to prevent constipation related to pain medication and anesthesia, and hold for loose stools.

## 2022-05-18 NOTE — ANESTHESIA PRE PROCEDURE
Department of Anesthesiology  Preprocedure Note       Name:  Selena Chavarria   Age:  36 y.o.  :  1990                                          MRN:  [id number]         Date:  2022      Surgeon: Nataly Marrero):  Jimbo Hinojosa MD    Procedure: Procedure(s):  ROBOTIC CHOLECYSTECTOMY WITH ICG    Medications prior to admission:   Prior to Admission medications    Medication Sig Start Date End Date Taking? Authorizing Provider   FLUoxetine (PROZAC) 20 MG capsule Take 1 capsule by mouth daily 5/3/22   Minus MD Remigio   Blood Pressure KIT 1 each by Does not apply route daily 5/3/22   Minus MD Remigio   ketorolac (TORADOL) 10 MG tablet Take 10 mg by mouth every 6 hours as needed 22  Historical Provider, MD       Current medications:    Current Facility-Administered Medications   Medication Dose Route Frequency Provider Last Rate Last Admin    clindamycin (CLEOCIN) 900 mg in dextrose 5 % 50 mL IVPB  900 mg IntraVENous Once Jimbo Hinojosa MD        lactated ringers infusion   IntraVENous Continuous Jimbo Hinojosa MD           Allergies: Allergies   Allergen Reactions    Penicillins Anaphylaxis       Problem List:    Patient Active Problem List   Diagnosis Code    Weakness R53.1    Numbness R20.0    Anxiety and depression F41.9, F32. A    Gait abnormality R26.9    Mild episode of recurrent major depressive disorder (HCC) F33.0    Gallstones K80.20    Tobacco dependence F17.200    Elevated blood pressure reading without diagnosis of hypertension R03.0       Past Medical History:        Diagnosis Date    Anxiety     Depression, acute 2019    Hypertension     Tobacco dependence 5/3/2022       Past Surgical History:        Procedure Laterality Date    APPENDECTOMY       SECTION      TUBAL LIGATION         Social History:    Social History     Tobacco Use    Smoking status: Current Every Day Smoker     Packs/day: 0.50     Years: 10.00     Pack years: 5.00     Types: Cigarettes, E-Cigarettes    Smokeless tobacco: Never Used   Substance Use Topics    Alcohol use: Yes     Alcohol/week: 8.0 standard drinks     Types: 8 Cans of beer per week                                Ready to quit: Not Answered  Counseling given: Not Answered      Vital Signs (Current): There were no vitals filed for this visit. BP Readings from Last 3 Encounters:   05/05/22 132/86   04/11/22 (!) 142/90   04/06/21 137/88       NPO Status:                                                                                 BMI:   Wt Readings from Last 3 Encounters:   05/13/22 242 lb (109.8 kg)   05/05/22 243 lb (110.2 kg)   04/11/22 240 lb 9.6 oz (109.1 kg)     There is no height or weight on file to calculate BMI.    CBC:   Lab Results   Component Value Date    WBC 9.7 05/03/2022    RBC 4.26 05/03/2022    HGB 12.5 05/03/2022    HCT 38.7 05/03/2022    MCV 90.8 05/03/2022    RDW 13.6 05/03/2022     05/03/2022       CMP:   Lab Results   Component Value Date     05/03/2022    K 4.7 05/03/2022     05/03/2022    CO2 22 05/03/2022    BUN 9 05/03/2022    CREATININE 0.9 05/03/2022    GFRAA >59 05/03/2022    LABGLOM >60 05/03/2022    GLUCOSE 85 05/03/2022    PROT 7.4 05/03/2022    CALCIUM 9.7 05/03/2022    BILITOT <0.2 05/03/2022    ALKPHOS 73 05/03/2022    AST 25 05/03/2022    ALT 26 05/03/2022       POC Tests: No results for input(s): POCGLU, POCNA, POCK, POCCL, POCBUN, POCHEMO, POCHCT in the last 72 hours.     Coags: No results found for: PROTIME, INR, APTT    HCG (If Applicable):   Lab Results   Component Value Date    PREGTESTUR Negative 08/18/2020        ABGs: No results found for: PHART, PO2ART, XYE2HRC, EFT4AGL, BEART, S0LWWGQH     Type & Screen (If Applicable):  No results found for: LABABO, LABRH    Drug/Infectious Status (If Applicable):  No results found for: HIV, HEPCAB    COVID-19 Screening (If Applicable):   Lab Results   Component Value Date [address] [facility] DETECTED 04/06/2021           Anesthesia Evaluation  Patient summary reviewed and Nursing notes reviewed no history of anesthetic complications:   Airway: Mallampati: I  TM distance: >3 FB   Neck ROM: full  Mouth opening: > = 3 FB Dental:          Pulmonary:normal exam    (+) current smoker                           Cardiovascular:    (+) hypertension:,          Beta Blocker:  Not on Beta Blocker         Neuro/Psych:   (+) psychiatric history: stable with treatment            GI/Hepatic/Renal:        (-) GERD, liver disease and no renal disease       Endo/Other:        (-) diabetes mellitus               Abdominal:             Vascular: negative vascular ROS. Other Findings:             Anesthesia Plan      general     ASA 2       Induction: intravenous. MIPS: Postoperative opioids intended and Prophylactic antiemetics administered. Anesthetic plan and risks discussed with patient.                       Ronald Rizo MD   5/18/2022

## 2022-05-18 NOTE — DISCHARGE INSTR - DIET
Good nutrition is important when healing from an illness, injury, or surgery. Follow any nutrition recommendations given to you during your hospital stay. If you were given an oral nutrition supplement while in the hospital, continue to take this supplement at home. You can take it with meals, in-between meals, and/or before bedtime. These supplements can be purchased at most local grocery stores, pharmacies, and chain [employer]-stores. If you have any questions about your diet or nutrition, call the hospital and ask for the dietitian.     Your usual diet as tolerated

## 2022-05-18 NOTE — INTERVAL H&P NOTE
Update History & Physical    The patient's History and Physical of May 5, 2022 was reviewed with the patient and I examined the patient. There was no change. The surgical site was confirmed by the patient and me. Plan: The risks, benefits, expected outcome, and alternative to the recommended procedure have been discussed with the patient. Patient understands and wants to proceed with the procedure.      Electronically signed by Serafin Hutchins MD on 5/18/2022 at 7:40 AM

## 2022-05-18 NOTE — ANESTHESIA POSTPROCEDURE EVALUATION
Department of Anesthesiology  Postprocedure Note    Patient: Radha Campbell  MRN: [id number]  YOB: 1990  Date of evaluation: 5/18/2022  Time:  9:25 AM     Procedure Summary     Date: 05/18/22 Room / Location: [address]    Anesthesia Start: 3289 Anesthesia Stop:     Procedure: ROBOTIC CHOLECYSTECTOMY WITH ICG (N/A ) Diagnosis:       Calculus of gallbladder with cholecystitis without biliary obstruction, unspecified cholecystitis acuity      (K80.10 - CHOLECYSTITIS WITH CHOLELITHIASIS)    Surgeons: Yordan Negrete MD Responsible Provider: GISELLE Pappas CRNA    Anesthesia Type: general ASA Status: 2          Anesthesia Type: No value filed. Eric Phase I: Eric Score: 5    Eric Phase II:      Last vitals: Reviewed and per EMR flowsheets.        Anesthesia Post Evaluation    Patient location during evaluation: PACU  Patient participation: waiting for patient participation  Level of consciousness: responsive to painful stimuli  Pain score: 0  Airway patency: patent  Nausea & Vomiting: no nausea and no vomiting  Complications: no  Cardiovascular status: hemodynamically stable  Respiratory status: face mask and oral airway  Hydration status: euvolemic  Multimodal analgesia pain management approach

## 2022-06-02 ENCOUNTER — OFFICE VISIT (OUTPATIENT)
Dept: SURGERY | Age: 32
End: 2022-06-02

## 2022-06-02 VITALS
BODY MASS INDEX: 38.92 KG/M2 | HEIGHT: 67 IN | WEIGHT: 248 LBS | HEART RATE: 75 BPM | OXYGEN SATURATION: 98 % | TEMPERATURE: 97.8 F

## 2022-06-02 DIAGNOSIS — Z09 POSTOPERATIVE EXAMINATION: Primary | ICD-10-CM

## 2022-06-02 PROCEDURE — 99024 POSTOP FOLLOW-UP VISIT: CPT | Performed by: SURGERY

## 2022-06-02 NOTE — LETTER
[facility] General Surgery  [address]  Phone: [phone]  Fax: [phone]    Francoise Villa MD        June 2, 2022     Patient: Gloria Castaneda   YOB: 1990   Date of Visit: 6/2/2022       To Whom It May Concern: It is my medical opinion that Alber Finney may return to work on 6/3/2022 with no restrictions. If you have any questions or concerns, please don't hesitate to call.     Sincerely,        Francoise Villa MD

## 2022-06-02 NOTE — PROGRESS NOTES
Postop Progress Note    Lucina Gross presents to the office for postop follow up, 2 weeks s/p laparoscopic robot assisted cholecystectomy. She reports that she has been doing well, denies any acute issues, is tolerating a diet, and post operative pain is improved. Objective    Vitals:    06/02/22 0941   Pulse: 75   Temp: 97.8 °F (36.6 °C)   SpO2: 98%     General: alert, cooperative and no distress  Abdomen: Soft, NT, ND, incisions C/D/I, no erythema or induration    Pathology:  FINAL DIAGNOSIS:     Gallbladder, cholecystectomy: Mild chronic cholecystitis.    CBG/CBG     Assessment  [place] yo female s/p laparoscopic robot assisted cholecysectomy  1) Doing well post operatively. Okay to have diet and activity as tolerated  2) Follow up in general surgery as needed and call for any questions or concerns.     Electronically signed by Sg Dukes MD on 6/2/2022 at 9:48 AM

## 2022-06-09 ENCOUNTER — OFFICE VISIT (OUTPATIENT)
Dept: FAMILY MEDICINE CLINIC | Age: 32
End: 2022-06-09
Payer: MEDICAID

## 2022-06-09 VITALS
DIASTOLIC BLOOD PRESSURE: 78 MMHG | SYSTOLIC BLOOD PRESSURE: 132 MMHG | OXYGEN SATURATION: 99 % | HEIGHT: 67 IN | HEART RATE: 85 BPM | BODY MASS INDEX: 38.45 KG/M2 | WEIGHT: 245 LBS

## 2022-06-09 DIAGNOSIS — F41.9 ANXIETY AND DEPRESSION: ICD-10-CM

## 2022-06-09 DIAGNOSIS — B07.0 PLANTAR WART: ICD-10-CM

## 2022-06-09 DIAGNOSIS — L30.9 ECZEMA, UNSPECIFIED TYPE: ICD-10-CM

## 2022-06-09 DIAGNOSIS — R00.2 PALPITATIONS: Primary | ICD-10-CM

## 2022-06-09 DIAGNOSIS — F32.A ANXIETY AND DEPRESSION: ICD-10-CM

## 2022-06-09 DIAGNOSIS — E55.9 VITAMIN D DEFICIENCY: ICD-10-CM

## 2022-06-09 DIAGNOSIS — R03.0 ELEVATED BLOOD PRESSURE READING WITHOUT DIAGNOSIS OF HYPERTENSION: ICD-10-CM

## 2022-06-09 PROCEDURE — 99214 OFFICE O/P EST MOD 30 MIN: CPT | Performed by: FAMILY MEDICINE

## 2022-06-09 PROCEDURE — 93000 ELECTROCARDIOGRAM COMPLETE: CPT | Performed by: FAMILY MEDICINE

## 2022-06-09 RX ORDER — TRIAMCINOLONE ACETONIDE 1 MG/G
CREAM TOPICAL
Qty: 453.6 G | Refills: 0 | Status: SHIPPED | OUTPATIENT
Start: 2022-06-09

## 2022-06-09 ASSESSMENT — ENCOUNTER SYMPTOMS
EYE ITCHING: 0
BACK PAIN: 0
VOMITING: 0
APNEA: 0
NAUSEA: 0
FACIAL SWELLING: 0
STRIDOR: 0
EYE DISCHARGE: 0
COLOR CHANGE: 0

## 2022-06-09 NOTE — PROGRESS NOTES
[facility] PHYSICIAN SERVICES  [facility] FAMILY MEDICINE  [address] [facility] [address]  [address] [address]  Dept: [phone]  Dept Fax: [phone]  Loc: [phone]    Subjective:     Meghann Dickey is a 36 y.o. female who presents today for her medical conditions/complaints as noted below. Meghann Dickey is c/o of Follow-up (Flutter in chest sometimes, )        HPI:   Patient presents for 4-week follow-up, lab results. Will order her lab results in detail. Vitamin D level 27.3, otherwise within normal limits. Encouraged her to start 800 or 1000 international units daily. She is having palpitations, has had them for a long time, forgot to mention them at last appointment, but has become more frequent in the last 1 month. She does have family history of heart disease but no arrhythmia that she is aware of. At times she feels lightheaded but denies any syncopal episodes. The palpitations are approximately 3 times a week, lasting less than 1 minute. She checks her blood pressure at work, it was 153/85 on Sunday. She had headache worsening of her tinnitus, but no chest pain at that time. She states her insurance did not cover blood pressure machine, is wondering how much about it costs OTC. Her other main concern today is eczema. She states it is more severe in her left arm, especially in the summertime. She also has a small spot on the bridge of her nose, which is worse whenever she has to wear KN95 mask at work. PHQ Scores 5/3/2022   PHQ2 Score 0   PHQ9 Score 0     Interpretation of Total Score Depression Severity: 1-4 = Minimal depression, 5-9 = Mild depression, 10-14 = Moderate depression, 15-19 = Moderately severe depression, 20-27 = Severe depression     VAERY 7 SCORE 5/3/2022   AVERY-7 Total Score 19     Interpretation of AVERY-7 score: 5-9 = mild anxiety, 10-14 = moderate anxiety, 15+ = severe anxiety. Recommend referral to behavioral health for scores 10 or greater.      Past palpitations. Gastrointestinal: Negative for nausea and vomiting. Endocrine: Negative for cold intolerance and heat intolerance. Genitourinary: Negative for frequency and urgency. Musculoskeletal: Negative for arthralgias and back pain. Skin: Negative for color change and rash. See HPI for visit specific review of symptoms. All others negative      Objective:   /78 (Site: Left Upper Arm, Position: Sitting, Cuff Size: Medium Adult)   Pulse 85   Ht 5' 7\" (1.702 m)   Wt 245 lb (111.1 kg)   SpO2 99%   BMI 38.37 kg/m²   Physical Exam  Constitutional:       Appearance: She is well-developed. HENT:      Head: Normocephalic and atraumatic. Right Ear: External ear normal.      Left Ear: External ear normal.   Eyes:      Conjunctiva/sclera: Conjunctivae normal.      Pupils: Pupils are equal, round, and reactive to light. Cardiovascular:      Rate and Rhythm: Normal rate and regular rhythm. Heart sounds: Normal heart sounds. Pulmonary:      Effort: Pulmonary effort is normal. No respiratory distress. Breath sounds: Normal breath sounds. Abdominal:      General: Bowel sounds are normal. There is no distension. Palpations: Abdomen is soft. Tenderness: There is no abdominal tenderness. Musculoskeletal:         General: Normal range of motion. Cervical back: Normal range of motion and neck supple. Skin:     General: Skin is warm. Capillary Refill: Capillary refill takes less than 2 seconds. Findings: No rash. Neurological:      Mental Status: She is alert and oriented to person, place, and time. Cranial Nerves: No cranial nerve deficit. Psychiatric:         Thought Content:  Thought content normal.           Lab Review   Recent Results (from the past 672 hour(s))   EKG 12 Lead    Collection Time: 05/13/22  8:03 AM   Result Value Ref Range    P-R Interval 184 ms    QRS Duration 88 ms    Q-T Interval 376 ms    QTc Calculation (Bazett) 388 ms    P Axis 25 degrees    T Axis 25 degrees   Pregnancy, Urine    Collection Time: 05/18/22  6:30 AM   Result Value Ref Range    HCG(Urine) Pregnancy Test Negative                Assessment & Plan: The following diagnoses and conditions are stable with no further orders unless indicated:    Patient Active Problem List    Diagnosis Date Noted    Tobacco dependence 05/03/2022     Priority: Medium    Elevated blood pressure reading without diagnosis of hypertension 05/03/2022     Priority: Medium    Gallstones 05/01/2022     Priority: Medium    [facility] 06/06/2019    Weakness 11/02/2018    Numbness 11/02/2018    Anxiety and depression 11/02/2018    Gait abnormality 11/02/2018        Hailey Alaniz was seen today for follow-up. Diagnoses and all orders for this visit:    Palpitations  -     EKG 12 Lead; Future  -     EKG 12 Lead    Vitamin D deficiency    Elevated blood pressure reading without diagnosis of hypertension    Anxiety and depression    Eczema, unspecified type  -     triamcinolone (KENALOG) 0.1 % cream; Apply topically 2 times daily. Plantar wart  -     Yevgeniy Ambrocio MD, Orthopaedic Surgery, [place]    Due to her symptoms, had her do EKG, personally reviewed, normal sinus rhythm. Read by machine is low voltage, agree, probably due to obesity. She does not drink caffeinated beverages hardly. Will defer labs and Holter monitor at this time. Over 50% of the total visit time of 30 minutes was spent on counseling and/or coordination of care of   1. Palpitations    2. Vitamin D deficiency    3. Elevated blood pressure reading without diagnosis of hypertension    4. Anxiety and depression    5. Eczema, unspecified type    6.  Plantar wart         Health Maintenance   Topic Date Due    Varicella vaccine (1 of 2 - 2-dose childhood series) Never done    COVID-19 Vaccine (1) Never done    Pneumococcal 0-64 years Vaccine (1 - PCV) Never done   Paragbrian Santosr DTaP/Tdap/Td vaccine (1 - Tdap) Never done    Pap smear  Never done    Flu vaccine (Season Ended) 09/01/2022    Depression Monitoring  05/03/2023    Hepatitis C screen  Completed    HIV screen  Completed    Hepatitis A vaccine  Aged Out    Hepatitis B vaccine  Aged Out    Hib vaccine  Aged Out    Meningococcal (ACWY) vaccine  Aged Out         Return in about 2 months (around 8/9/2022) for bp and palpitations, in office. Discussed use, benefit, and side effects of prescribed medications. All patient questions answered. Pt voiced understanding. Reviewed health maintenance. Instructed to continue current medications, diet and exercise. Patient agreedwith treatment plan. Follow up as directed. Old records reviewed, where available.     Vladimir Castano MD    Note:  dictated using Dragon software

## 2022-06-09 NOTE — PATIENT INSTRUCTIONS
Patient Education   Patient Education        Atopic Dermatitis: Care Instructions  Overview  Atopic dermatitis (also called eczema) is a skin problem that causes intense itching and a red, raised rash. In severe cases, the rash develops clear fluid-filled blisters. The rash is not contagious. You can't catch it from others. People with this condition seem to have very sensitive immune systems that are likely to react to things that cause allergies. The immune system isthe body's way of fighting infection. There is no cure for atopic dermatitis. But you may be able to control it withcare at home. Follow-up care is a key part of your treatment and safety. Be sure to make and go to all appointments, and call your doctor if you are having problems. It's also a good idea to know your test results and keep alist of the medicines you take. How can you care for yourself at home?  Use moisturizer at least twice a day.  If your doctor prescribes a cream, use it as directed. If your doctor prescribes other medicine, take it exactly as directed.  Wash the affected area with warm (not hot) water only. Soap can make dryness and itching worse. Pat dry.  Apply a moisturizer after bathing. Use a cream such as Cetaphil, Lubriderm, or Moisturel that does not irritate the skin or cause a rash. Apply the cream while your skin is still damp after lightly drying with a towel.  Use cold, wet cloths to reduce itching.  Keep cool, and stay out of the sun.  If itching affects your sleep, ask your doctor if you can take an antihistamine that might reduce itching and make you sleepy, such as diphenhydramine (Benadryl). Be safe with medicines. Read and follow all instructions on the label.  Control scratching. Keep your fingernails trimmed and smooth to prevent damage to the skin when you scratch it. Wearing cotton mittens or gloves can help you stop scratching.  Try to avoid things that trigger your rash.  These may include things like allergens, such as pollen or animal dander. Harsh soaps, scratchy clothes, stress, and some foods are other examples. When should you call for help? Call your doctor now or seek immediate medical care if:     Your rash gets worse and you have a fever.      You have new blisters or bruises, or the rash spreads and looks like a sunburn.      You have signs of infection, such as:  ? Increased pain, swelling, warmth, or redness. ? Red streaks leading from the rash. ? Pus draining from the rash. ? A fever.      You have crusting or oozing sores.      You have joint aches or body aches along with your rash. Watch closely for changes in your health, and be sure to contact your doctor if:     Your rash does not clear up after 2 to 3 weeks of home treatment.      Itching interferes with your sleep or daily activities. Where can you learn more? Go to https://[employer] Scienceeweb.[employer]. org and sign in to your ShopIt account. Enter E383 in the Clever box to learn more about \"Atopic Dermatitis: Care Instructions. \"     If you do not have an account, please click on the \"Sign Up Now\" link. Current as of: November 15, 2021               Content Version: 13.2  © 2006-2022 [employer]. Care instructions adapted under license by [facility] ([facility]). If you have questions about a medical condition or this instruction, always ask your healthcare professional. [place] [address] any warranty or liability for your use of this information. Palpitations: Care Instructions  Your Care Instructions     Heart palpitations are the uncomfortable sensation that your heart is beating fast or irregularly. You might feel pounding or fluttering in your chest. Itmight feel like your heart is skipping a beat. Although palpitations may be caused by a heart problem, they also occur because of stress, fatigue, or use of alcohol, caffeine, or nicotine.  Many medicines, including diet pills, antihistamines, decongestants, and some herbal products, can cause heart palpitations. Nearly everyone has palpitations from time totime. Depending on your symptoms, your doctor may need to do more tests to try tofind the cause of your palpitations. Follow-up care is a key part of your treatment and safety. Be sure to make and go to all appointments, and call your doctor if you are having problems. It's also a good idea to know your test results and keep alist of the medicines you take. How can you care for yourself at home?  Avoid caffeine, nicotine, and excess alcohol.  Do not take illegal drugs, such as methamphetamines and cocaine.  Do not take weight loss or diet medicines unless you talk with your doctor first.   Get plenty of sleep.  Do not overeat.  If you have palpitations again, take deep breaths and try to relax. This may slow a racing heart.  If you start to feel lightheaded, lie down to avoid injuries that might result if you pass out and fall down.  Keep a record of your palpitations and bring it to your next doctor's appointment. Write down:  ? The date and time. ? Your pulse. (If your heart is beating fast, it may be hard to count your pulse.)  ? What you were doing when the palpitations started. ? How long the palpitations lasted. ? Any other symptoms.  If an activity causes palpitations, slow down or stop. Talk to your doctor before you do that activity again.  Take your medicines exactly as prescribed. Call your doctor if you think you are having a problem with your medicine. When should you call for help? Call 911 anytime you think you may need emergency care. For example, call if:     You passed out (lost consciousness).      You have symptoms of a heart attack. These may include:  ? Chest pain or pressure, or a strange feeling in the chest.  ? Sweating. ? Shortness of breath.   ? Pain, pressure, or a strange feeling in the back, neck, jaw, or upper belly or in one or both shoulders or arms. ? Lightheadedness or sudden weakness. ? A fast or irregular heartbeat. After you call 911, the  may tell you to chew 1 adult-strength or 2 to 4 low-dose aspirin. Wait for an ambulance. Do not try to drive yourself.      You have symptoms of a stroke. These may include:  ? Sudden numbness, tingling, weakness, or loss of movement in your face, arm, or leg, especially on only one side of your body. ? Sudden vision changes. ? Sudden trouble speaking. ? Sudden confusion or trouble understanding simple statements. ? Sudden problems with walking or balance. ? A sudden, severe headache that is different from past headaches. Call your doctor now or seek immediate medical care if:     You have heart palpitations and:  ? Are dizzy or lightheaded, or you feel like you may faint. ? Have new or increased shortness of breath. Watch closely for changes in your health, and be sure to contact your doctor if:     You continue to have heart palpitations. Where can you learn more? Go to https://[employer]. org and sign in to your Market Wire account. Enter R508 in the [employer] box to learn more about \"Palpitations: Care Instructions. \"     If you do not have an account, please click on the \"Sign Up Now\" link. Current as of: January 10, 2022               Content Version: 13.2  © 6829-7660 [employer]. Care instructions adapted under license by [facility] ([facility]). If you have questions about a medical condition or this instruction, always ask your healthcare professional. [place] [address] any warranty or liability for your use of this information.

## 2022-07-13 DIAGNOSIS — F32.A ANXIETY AND DEPRESSION: ICD-10-CM

## 2022-07-13 DIAGNOSIS — F41.9 ANXIETY AND DEPRESSION: ICD-10-CM

## 2022-07-14 RX ORDER — FLUOXETINE HYDROCHLORIDE 20 MG/1
20 CAPSULE ORAL DAILY
Qty: 30 CAPSULE | Refills: 1 | Status: SHIPPED | OUTPATIENT
Start: 2022-07-14

## 2022-07-29 ENCOUNTER — OFFICE VISIT (OUTPATIENT)
Age: 32
End: 2022-07-29
Payer: MEDICAID

## 2022-07-29 VITALS
HEIGHT: 67 IN | TEMPERATURE: 99.1 F | BODY MASS INDEX: 38.14 KG/M2 | WEIGHT: 243 LBS | OXYGEN SATURATION: 97 % | HEART RATE: 110 BPM | SYSTOLIC BLOOD PRESSURE: 120 MMHG | RESPIRATION RATE: 20 BRPM | DIASTOLIC BLOOD PRESSURE: 86 MMHG

## 2022-07-29 DIAGNOSIS — H66.003 NON-RECURRENT ACUTE SUPPURATIVE OTITIS MEDIA OF BOTH EARS WITHOUT SPONTANEOUS RUPTURE OF TYMPANIC MEMBRANES: Primary | ICD-10-CM

## 2022-07-29 DIAGNOSIS — Z11.52 ENCOUNTER FOR SCREENING FOR COVID-19: ICD-10-CM

## 2022-07-29 DIAGNOSIS — R50.9 FEVER, UNSPECIFIED FEVER CAUSE: ICD-10-CM

## 2022-07-29 LAB — SARS-COV-2, PCR: NOT DETECTED

## 2022-07-29 PROCEDURE — 99213 OFFICE O/P EST LOW 20 MIN: CPT

## 2022-07-29 RX ORDER — CEFDINIR 300 MG/1
300 CAPSULE ORAL 2 TIMES DAILY
Qty: 20 CAPSULE | Refills: 0 | Status: SHIPPED | OUTPATIENT
Start: 2022-07-29 | End: 2022-08-08

## 2022-07-29 ASSESSMENT — ENCOUNTER SYMPTOMS
COUGH: 0
SORE THROAT: 1

## 2022-07-29 NOTE — PATIENT INSTRUCTIONS
1. Covid test results will be called to you when they are available. 2. Antibiotic for full 10 days  3. Hydrate with water or gatorade  4. OTC cough/cold medications are okay -follow label instructions  5. Tylenol or motrin for pain or fever  6. Warm salt water gargles or warm liquids for comfort. Warm tea with a tablespoon of honey is excellent for sore throat. 7. Cool mist humidifer   8.  If symptoms worsen, please seek reevaluation

## 2022-07-29 NOTE — PROGRESS NOTES
[id number]  [address] [facility] [address] [id number]  Dept: [phone]  Dept Fax: [phone]  Loc: [phone]    Veda Vásquez is a 36 y.o. female who presents today for her medical conditions/complaints as noted below. Veda Vásquez is c/o of Otalgia and Pharyngitis        HPI:     HPI  Siva Blazing presents with complaints of fever, headache, sore throat and ear pain. Denies body aches, cough and congestion. Negative covid19 test 2 days ago. Symptoms began yesterday. OTC treatment includes ear drops without relief. Denies recent antibiotics or steroids. Denies recent covid19 infection and is vaccinated against covid19. Past Medical History:   Diagnosis Date    Anxiety     Depression, acute 2019    Hypertension     Tobacco dependence 5/3/2022     Past Surgical History:   Procedure Laterality Date    APPENDECTOMY       SECTION      CHOLECYSTECTOMY, LAPAROSCOPIC N/A 2022    ROBOTIC CHOLECYSTECTOMY WITH ICG performed by Amanda Spears MD at [address] [place] [address]         Family History   Problem Relation Age of Onset    Depression Mother     Heart Attack Mother     High Blood Pressure Mother     Miscarriages / [place] Mother     Other Maternal Grandmother         heart failure       Social History     Tobacco Use    Smoking status: Every Day     Packs/day: 0.50     Years: 10.00     Pack years: 5.00     Types: Cigarettes, E-Cigarettes    Smokeless tobacco: Never   Substance Use Topics    Alcohol use: Yes     Alcohol/week: 8.0 standard drinks     Types: 8 Cans of beer per week      Current Outpatient Medications   Medication Sig Dispense Refill    cefdinir (OMNICEF) 300 MG capsule Take 1 capsule by mouth in the morning and 1 capsule before bedtime. Do all this for 10 days.  20 capsule 0    FLUoxetine (PROZAC) 20 MG capsule TAKE 1 CAPSULE BY MOUTH DAILY 30 capsule 1    triamcinolone (KENALOG) 0.1 % cream Apply topically 2 times daily. 453.6 g 0    Cholecalciferol (VITAMIN D3) 20 MCG (800 UNIT) TABS Take by mouth (Patient not taking: Reported on 7/29/2022)      ondansetron (ZOFRAN ODT) 4 MG disintegrating tablet Take 1 tablet by mouth every 8 hours as needed for Nausea or Vomiting (Patient not taking: Reported on 7/29/2022) 10 tablet 2    ketorolac (TORADOL) 10 MG tablet Take 10 mg by mouth every 6 hours as needed (Patient not taking: Reported on 7/29/2022)       No current facility-administered medications for this visit. Allergies   Allergen Reactions    Penicillins Anaphylaxis       Health Maintenance   Topic Date Due    COVID-19 Vaccine (1) Never done    Varicella vaccine (1 of 2 - 2-dose childhood series) Never done    Pneumococcal 0-64 years Vaccine (1 - PCV) Never done    DTaP/Tdap/Td vaccine (1 - Tdap) Never done    Pap smear  Never done    Flu vaccine (1) 09/01/2022    Depression Monitoring  05/03/2023    Hepatitis C screen  Completed    HIV screen  Completed    Hepatitis A vaccine  Aged Out    Hepatitis B vaccine  Aged Out    Hib vaccine  Aged Out    Meningococcal (ACWY) vaccine  Aged Out       Subjective:     Review of Systems   Constitutional:  Positive for chills, fatigue and fever. HENT:  Positive for ear pain and sore throat. Negative for congestion. Respiratory:  Negative for cough. Musculoskeletal:  Negative for myalgias. Neurological:  Positive for headaches.     :Objective      Physical Exam  Constitutional:       General: She is not in acute distress. Appearance: Normal appearance. She is ill-appearing. She is not toxic-appearing. HENT:      Head: Normocephalic and atraumatic. Right Ear: Ear canal and external ear normal. Tympanic membrane is erythematous and bulging. Left Ear: Ear canal and external ear normal. Tympanic membrane is erythematous and bulging.       Ears:      Comments: Left worse than right     Nose: Nose normal.      Mouth/Throat:      Mouth: Mucous membranes are moist. Pharynx: Oropharynx is clear. Posterior oropharyngeal erythema present. No oropharyngeal exudate. Eyes:      General:         Right eye: No discharge. Left eye: No discharge. Conjunctiva/sclera: Conjunctivae normal.   Cardiovascular:      Rate and Rhythm: Normal rate and regular rhythm. Pulmonary:      Effort: Pulmonary effort is normal. No respiratory distress. Breath sounds: Normal breath sounds. Abdominal:      General: Abdomen is flat. Palpations: Abdomen is soft. Musculoskeletal:         General: Normal range of motion. Cervical back: Normal range of motion. Lymphadenopathy:      Cervical: No cervical adenopathy. Skin:     General: Skin is warm and dry. Capillary Refill: Capillary refill takes less than 2 seconds. Findings: No rash. Neurological:      General: No focal deficit present. Mental Status: She is alert. Psychiatric:         Mood and Affect: Mood normal.     /86   Pulse (!) 110   Temp 99.1 °F (37.3 °C) (Temporal)   Resp 20   Ht 5' 7\" (1.702 m)   Wt 243 lb (110.2 kg)   SpO2 97%   BMI 38.06 kg/m²     :Assessment       Diagnosis Orders   1. Non-recurrent acute suppurative otitis media of both ears without spontaneous rupture of tympanic membranes  cefdinir (OMNICEF) 300 MG capsule      2. Fever, unspecified fever cause  COVID-19      3. Encounter for screening for COVID-19  COVID-19          :Plan    Bilateral AOM noted no exam - pt states she tolerates omnicef. Likely viral infection causing fever and precipitating the AOM. R/O covid19 as source. Continue supportive care at home. Return precautions and home care education completed. Patient verbalized understanding. Orders Placed This Encounter   Procedures    COVID-19     Scheduling Instructions:      1) Due to current limited availability of the COVID-19 test, tests will be prioritized based on responses to questions above. Testing may be delayed due to volume. 2) Print and instruct patient to adhere to CDC home isolation program. (Link Above)              3) Set up or refer patient for a monitoring program.              4) Have patient sign up for and leverage MyChart (if not previously done). Order Specific Question:   Is this test for diagnosis or screening? Answer:   Diagnosis of ill patient     Order Specific Question:   Symptomatic for COVID-19 as defined by CDC? Answer:   Yes     Order Specific Question:   Date of Symptom Onset     Answer:   7/28/2022     Order Specific Question:   Hospitalized for COVID-19? Answer:   No     Order Specific Question:   Admitted to ICU for COVID-19? Answer:   No     Order Specific Question:   Employed in healthcare setting? Answer:   No     Order Specific Question:   Resident in a congregate (group) care setting? Answer:   No     Order Specific Question:   Pregnant? Answer:   Unknown     Order Specific Question:   Previously tested for COVID-19? Answer:   Yes    COVID-19    COVID-19       No follow-ups on file. Orders Placed This Encounter   Medications    cefdinir (OMNICEF) 300 MG capsule     Sig: Take 1 capsule by mouth in the morning and 1 capsule before bedtime. Do all this for 10 days. Dispense:  20 capsule     Refill:  0       Patient given educational materials- see patient instructions. Discussed use, benefit, and side effects of prescribed medications. All patient questions answered. Pt voiced understanding. Patient Instructions   1. Covid test results will be called to you when they are available. 2. Antibiotic for full 10 days  3. Hydrate with water or gatorade  4. OTC cough/cold medications are okay -follow label instructions  5. Tylenol or motrin for pain or fever  6. Warm salt water gargles or warm liquids for comfort. Warm tea with a tablespoon of honey is excellent for sore throat. 7. Cool mist humidifer   8.  If symptoms worsen, please seek reevaluation      Electronically signed by GISELLE Pineda CNP on 7/29/2022 at 7:46 AM

## 2022-08-16 ENCOUNTER — OFFICE VISIT (OUTPATIENT)
Dept: FAMILY MEDICINE CLINIC | Age: 32
End: 2022-08-16
Payer: MEDICAID

## 2022-08-16 VITALS
HEART RATE: 72 BPM | WEIGHT: 244.8 LBS | SYSTOLIC BLOOD PRESSURE: 138 MMHG | DIASTOLIC BLOOD PRESSURE: 84 MMHG | HEIGHT: 67 IN | TEMPERATURE: 98.9 F | OXYGEN SATURATION: 99 % | BODY MASS INDEX: 38.42 KG/M2

## 2022-08-16 DIAGNOSIS — F43.9 STRESS: ICD-10-CM

## 2022-08-16 DIAGNOSIS — R00.2 HEART PALPITATIONS: ICD-10-CM

## 2022-08-16 DIAGNOSIS — Z01.30 EXAMINATION OF BLOOD PRESSURE: Primary | ICD-10-CM

## 2022-08-16 PROCEDURE — 99213 OFFICE O/P EST LOW 20 MIN: CPT | Performed by: NURSE PRACTITIONER

## 2022-08-16 ASSESSMENT — ENCOUNTER SYMPTOMS
EYES NEGATIVE: 1
RESPIRATORY NEGATIVE: 1
ALLERGIC/IMMUNOLOGIC NEGATIVE: 1
GASTROINTESTINAL NEGATIVE: 1

## 2022-08-16 NOTE — PROGRESS NOTES
Problem Relation Age of Onset    Depression Mother     Heart Attack Mother     High Blood Pressure Mother     Miscarriages / [place] Mother     Other Maternal Grandmother         heart failure       Social History     Tobacco Use    Smoking status: Every Day     Packs/day: 0.50     Years: 10.00     Pack years: 5.00     Types: Cigarettes, E-Cigarettes    Smokeless tobacco: Never   Substance Use Topics    Alcohol use: Yes     Alcohol/week: 8.0 standard drinks     Types: 8 Cans of beer per week      Current Outpatient Medications on File Prior to Visit   Medication Sig Dispense Refill    FLUoxetine (PROZAC) 20 MG capsule TAKE 1 CAPSULE BY MOUTH DAILY 30 capsule 1    triamcinolone (KENALOG) 0.1 % cream Apply topically 2 times daily. 453.6 g 0    Cholecalciferol (VITAMIN D3) 20 MCG (800 UNIT) TABS Take by mouth (Patient not taking: Reported on 7/29/2022)      ondansetron (ZOFRAN ODT) 4 MG disintegrating tablet Take 1 tablet by mouth every 8 hours as needed for Nausea or Vomiting (Patient not taking: Reported on 7/29/2022) 10 tablet 2    ketorolac (TORADOL) 10 MG tablet Take 10 mg by mouth every 6 hours as needed (Patient not taking: Reported on 7/29/2022)       No current facility-administered medications on file prior to visit. Allergies   Allergen Reactions    Penicillins Anaphylaxis       Health Maintenance   Topic Date Due    COVID-19 Vaccine (1) Never done    Varicella vaccine (1 of 2 - 2-dose childhood series) Never done    Pneumococcal 0-64 years Vaccine (1 - PCV) Never done    DTaP/Tdap/Td vaccine (1 - Tdap) Never done    Pap smear  Never done    Flu vaccine (1) 09/01/2022    Depression Monitoring  05/03/2023    Hepatitis C screen  Completed    HIV screen  Completed    Hepatitis A vaccine  Aged Out    Hepatitis B vaccine  Aged Out    Hib vaccine  Aged Out    Meningococcal (ACWY) vaccine  Aged Out       Subjective   SUBJECTIVE/OBJECTIVE:  @HPI@    Review of Systems   Constitutional: Negative.     HENT: Negative. Eyes: Negative. Respiratory: Negative. Cardiovascular:  Positive for palpitations (occasional). Gastrointestinal: Negative. Endocrine: Negative. Genitourinary: Negative. Musculoskeletal: Negative. Skin: Negative. Allergic/Immunologic: Negative. Neurological: Negative. Hematological: Negative. Psychiatric/Behavioral: Negative. Objective   Physical Exam  Vitals and nursing note reviewed. Constitutional:       Appearance: Normal appearance. HENT:      Head: Normocephalic. Nose: Nose normal.   Cardiovascular:      Rate and Rhythm: Normal rate and regular rhythm. Pulses: Normal pulses. Heart sounds: Normal heart sounds. Pulmonary:      Effort: Pulmonary effort is normal.      Breath sounds: Normal breath sounds. Musculoskeletal:         General: Normal range of motion. Cervical back: Normal range of motion. Skin:     General: Skin is warm and dry. Neurological:      Mental Status: She is alert and oriented to person, place, and time. Psychiatric:         Mood and Affect: Mood normal.         Behavior: Behavior normal.         Thought Content: Thought content normal.         Judgment: Judgment normal.          ASSESSMENT/PLAN:  1. Examination of blood pressure  2. Heart palpitations  3. Stress  She feel heart palpitations are getting better  Return in about 4 months (around 12/16/2022) for follow up with PCP-Dr. Jovita Brooks. More than 50% of the time was spent counseling and coordinating care for a total time of 15-20min face to face.   Advised to continue monitoring blood pressure  If heart palpitations increase or worsen, follow up for further evaluation   Follow up in 4 months with PCP  PDMP Monitoring:    Last PDMP Joaquín Boothe as Reviewed:  Review User Review Instant Review Result            Urine Drug Screenings (1 yr)       Urine Drug Screen  Collected: 6/6/2019  4:20 AM (Final result)              Urine Drug Screen  Collected: 9/30/2018  5:00 PM (Final result)                  Medication Contract and Consent for Opioid Use Documents Filed        No documents found                     Patient given educational materials -see patient instructions. Discussed use, benefit, and side effects of prescribed medications. All patient questions answered. Pt voiced understanding. Reviewed health maintenance. Instructed to continue currentmedications, diet and exercise. Patient agreed with treatment plan. Follow up as directed. MEDICATIONS:  No orders of the defined types were placed in this encounter. ORDERS:  No orders of the defined types were placed in this encounter. Follow-up:  Return in about 4 months (around 12/16/2022) for follow up with PCP-Dr. Mary Merrill. PATIENT INSTRUCTIONS:  There are no Patient Instructions on file for this visit. Electronically signed by GISELLE Ellis on 8/16/2022 at 10:11 AM    EMR Dragon/transcription disclaimer:  Much of thisencounter note is electronic transcription/translation of spoken language to printed texts. The electronic translation of spoken language may be erroneous, or at times, nonsensical words or phrases may be inadvertentlytranscribed.   Although I have reviewed the note for such errors, some may still exist.

## 2022-10-09 ENCOUNTER — HOSPITAL ENCOUNTER (EMERGENCY)
Age: 32
Discharge: HOME | End: 2022-10-09
Attending: EMERGENCY MEDICINE
Payer: MEDICAID

## 2022-10-09 VITALS
HEIGHT: 67 IN | RESPIRATION RATE: 17 BRPM | SYSTOLIC BLOOD PRESSURE: 132 MMHG | OXYGEN SATURATION: 95 % | TEMPERATURE: 98.3 F | WEIGHT: 245 LBS | DIASTOLIC BLOOD PRESSURE: 82 MMHG | BODY MASS INDEX: 38.45 KG/M2 | HEART RATE: 84 BPM

## 2022-10-09 DIAGNOSIS — K08.89 PAIN, DENTAL: ICD-10-CM

## 2022-10-09 DIAGNOSIS — K04.7 DENTAL ABSCESS: Primary | ICD-10-CM

## 2022-10-09 DIAGNOSIS — K02.9 DENTAL CARIES: ICD-10-CM

## 2022-10-09 PROCEDURE — 2500000003 HC RX 250 WO HCPCS: Performed by: EMERGENCY MEDICINE

## 2022-10-09 PROCEDURE — 6360000002 HC RX W HCPCS: Performed by: EMERGENCY MEDICINE

## 2022-10-09 PROCEDURE — 99284 EMERGENCY DEPT VISIT MOD MDM: CPT

## 2022-10-09 PROCEDURE — 96372 THER/PROPH/DIAG INJ SC/IM: CPT

## 2022-10-09 RX ORDER — HYDROCODONE BITARTRATE AND ACETAMINOPHEN 7.5; 325 MG/1; MG/1
1 TABLET ORAL EVERY 6 HOURS PRN
Qty: 12 TABLET | Refills: 0 | Status: SHIPPED | OUTPATIENT
Start: 2022-10-09 | End: 2022-10-12

## 2022-10-09 RX ORDER — CLINDAMYCIN HYDROCHLORIDE 300 MG/1
300 CAPSULE ORAL 3 TIMES DAILY
Qty: 30 CAPSULE | Refills: 0 | Status: SHIPPED | OUTPATIENT
Start: 2022-10-09 | End: 2022-10-19

## 2022-10-09 RX ORDER — KETOROLAC TROMETHAMINE 30 MG/ML
60 INJECTION, SOLUTION INTRAMUSCULAR; INTRAVENOUS ONCE
Status: COMPLETED | OUTPATIENT
Start: 2022-10-09 | End: 2022-10-09

## 2022-10-09 RX ADMIN — LIDOCAINE HYDROCHLORIDE 1000 MG: 10 INJECTION, SOLUTION EPIDURAL; INFILTRATION; INTRACAUDAL; PERINEURAL at 08:55

## 2022-10-09 RX ADMIN — KETOROLAC TROMETHAMINE 60 MG: 30 INJECTION, SOLUTION INTRAMUSCULAR; INTRAVENOUS at 08:55

## 2022-10-09 ASSESSMENT — ENCOUNTER SYMPTOMS
SINUS PRESSURE: 0
ABDOMINAL PAIN: 0
EYE DISCHARGE: 0
BLOOD IN STOOL: 0
TROUBLE SWALLOWING: 0
SHORTNESS OF BREATH: 0
APNEA: 0
CONSTIPATION: 0
DIARRHEA: 0
SORE THROAT: 0
CHOKING: 0
VOICE CHANGE: 0
NAUSEA: 0
FACIAL SWELLING: 1

## 2022-10-09 NOTE — DISCHARGE INSTRUCTIONS
If you can,  probiotics are a good source of natural bacteria for the gut. You take them when you are on antibiotic to reduce antibiotic side effects in the gut. If you can consider taking probiotics while on the antibiotics.

## 2022-11-10 DIAGNOSIS — F41.9 ANXIETY AND DEPRESSION: ICD-10-CM

## 2022-11-10 DIAGNOSIS — F32.A ANXIETY AND DEPRESSION: ICD-10-CM

## 2022-11-10 RX ORDER — FLUOXETINE HYDROCHLORIDE 20 MG/1
20 CAPSULE ORAL DAILY
Qty: 30 CAPSULE | Refills: 1 | Status: SHIPPED | OUTPATIENT
Start: 2022-11-10

## 2022-11-10 NOTE — TELEPHONE ENCOUNTER
Ami Cunningham called to request a refill on her medication.       Last office visit : 8/16/2022   Next office visit : 12/16/2022     Requested Prescriptions     Pending Prescriptions Disp Refills    FLUoxetine (PROZAC) 20 MG capsule [Pharmacy Med Name: FLUOXETINE 20MG CAPSULES] 30 capsule 1     Sig: TAKE 1 CAPSULE BY MOUTH DAILY            Alexandru Edward

## 2022-12-13 ENCOUNTER — HOSPITAL ENCOUNTER (EMERGENCY)
Age: 32
Discharge: HOME | End: 2022-12-13
Payer: MEDICAID

## 2022-12-13 ENCOUNTER — APPOINTMENT (OUTPATIENT)
Dept: GENERAL RADIOLOGY | Age: 32
End: 2022-12-13
Payer: MEDICAID

## 2022-12-13 VITALS
WEIGHT: 245 LBS | BODY MASS INDEX: 38.37 KG/M2 | HEART RATE: 72 BPM | TEMPERATURE: 98.1 F | DIASTOLIC BLOOD PRESSURE: 69 MMHG | RESPIRATION RATE: 16 BRPM | SYSTOLIC BLOOD PRESSURE: 138 MMHG | OXYGEN SATURATION: 99 %

## 2022-12-13 DIAGNOSIS — R10.13 ABDOMINAL PAIN, EPIGASTRIC: Primary | ICD-10-CM

## 2022-12-13 LAB
ALBUMIN SERPL-MCNC: 4.2 G/DL (ref 3.5–5.2)
ALP BLD-CCNC: 70 U/L (ref 35–104)
ALT SERPL-CCNC: 14 U/L (ref 5–33)
ANION GAP SERPL CALCULATED.3IONS-SCNC: 9 MMOL/L (ref 7–19)
AST SERPL-CCNC: 12 U/L (ref 5–32)
BACTERIA: NEGATIVE /HPF
BASOPHILS ABSOLUTE: 0.1 K/UL (ref 0–0.2)
BASOPHILS RELATIVE PERCENT: 0.9 % (ref 0–1)
BILIRUB SERPL-MCNC: <0.2 MG/DL (ref 0.2–1.2)
BILIRUBIN URINE: NEGATIVE
BLOOD, URINE: NEGATIVE
BUN BLDV-MCNC: 13 MG/DL (ref 6–20)
CALCIUM SERPL-MCNC: 9.4 MG/DL (ref 8.6–10)
CHLORIDE BLD-SCNC: 108 MMOL/L (ref 98–111)
CLARITY: CLEAR
CO2: 23 MMOL/L (ref 22–29)
COLOR: YELLOW
CREAT SERPL-MCNC: 0.7 MG/DL (ref 0.5–0.9)
CRYSTALS, UA: ABNORMAL /HPF
EOSINOPHILS ABSOLUTE: 0.3 K/UL (ref 0–0.6)
EOSINOPHILS RELATIVE PERCENT: 3.9 % (ref 0–5)
EPITHELIAL CELLS, UA: 5 /HPF (ref 0–5)
GFR SERPL CREATININE-BSD FRML MDRD: >60 ML/MIN/{1.73_M2}
GLUCOSE BLD-MCNC: 98 MG/DL (ref 74–109)
GLUCOSE URINE: NEGATIVE MG/DL
HCG QUALITATIVE: NEGATIVE
HCT VFR BLD CALC: 38.3 % (ref 37–47)
HEMOGLOBIN: 12.5 G/DL (ref 12–16)
HYALINE CASTS: 0 /HPF (ref 0–8)
IMMATURE GRANULOCYTES #: 0 K/UL
KETONES, URINE: NEGATIVE MG/DL
LEUKOCYTE ESTERASE, URINE: ABNORMAL
LIPASE: 31 U/L (ref 13–60)
LYMPHOCYTES ABSOLUTE: 2.6 K/UL (ref 1.1–4.5)
LYMPHOCYTES RELATIVE PERCENT: 33.2 % (ref 20–40)
MCH RBC QN AUTO: 29.3 PG (ref 27–31)
MCHC RBC AUTO-ENTMCNC: 32.6 G/DL (ref 33–37)
MCV RBC AUTO: 89.9 FL (ref 81–99)
MONOCYTES ABSOLUTE: 0.4 K/UL (ref 0–0.9)
MONOCYTES RELATIVE PERCENT: 5.5 % (ref 0–10)
NEUTROPHILS ABSOLUTE: 4.3 K/UL (ref 1.5–7.5)
NEUTROPHILS RELATIVE PERCENT: 56.4 % (ref 50–65)
NITRITE, URINE: NEGATIVE
PDW BLD-RTO: 14.2 % (ref 11.5–14.5)
PH UA: 6 (ref 5–8)
PLATELET # BLD: 259 K/UL (ref 130–400)
PMV BLD AUTO: 11.4 FL (ref 9.4–12.3)
POTASSIUM SERPL-SCNC: 4.2 MMOL/L (ref 3.5–5)
PROTEIN UA: NEGATIVE MG/DL
RBC # BLD: 4.26 M/UL (ref 4.2–5.4)
RBC UA: 1 /HPF (ref 0–4)
SODIUM BLD-SCNC: 140 MMOL/L (ref 136–145)
SPECIFIC GRAVITY UA: 1.01 (ref 1–1.03)
TOTAL PROTEIN: 7.3 G/DL (ref 6.6–8.7)
UROBILINOGEN, URINE: 0.2 E.U./DL
WBC # BLD: 7.7 K/UL (ref 4.8–10.8)
WBC UA: 7 /HPF (ref 0–5)

## 2022-12-13 PROCEDURE — 6370000000 HC RX 637 (ALT 250 FOR IP): Performed by: NURSE PRACTITIONER

## 2022-12-13 PROCEDURE — 80053 COMPREHEN METABOLIC PANEL: CPT

## 2022-12-13 PROCEDURE — 81001 URINALYSIS AUTO W/SCOPE: CPT

## 2022-12-13 PROCEDURE — 36415 COLL VENOUS BLD VENIPUNCTURE: CPT

## 2022-12-13 PROCEDURE — 83690 ASSAY OF LIPASE: CPT

## 2022-12-13 PROCEDURE — 85025 COMPLETE CBC W/AUTO DIFF WBC: CPT

## 2022-12-13 PROCEDURE — 99284 EMERGENCY DEPT VISIT MOD MDM: CPT | Performed by: EMERGENCY MEDICINE

## 2022-12-13 PROCEDURE — 71045 X-RAY EXAM CHEST 1 VIEW: CPT

## 2022-12-13 PROCEDURE — 84703 CHORIONIC GONADOTROPIN ASSAY: CPT

## 2022-12-13 RX ORDER — ONDANSETRON 2 MG/ML
4 INJECTION INTRAMUSCULAR; INTRAVENOUS ONCE
Status: DISCONTINUED | OUTPATIENT
Start: 2022-12-13 | End: 2022-12-13 | Stop reason: HOSPADM

## 2022-12-13 RX ORDER — PANTOPRAZOLE SODIUM 20 MG/1
20 TABLET, DELAYED RELEASE ORAL DAILY
Qty: 30 TABLET | Refills: 0 | Status: SHIPPED | OUTPATIENT
Start: 2022-12-13

## 2022-12-13 RX ADMIN — ALUMINUM HYDROXIDE, MAGNESIUM HYDROXIDE, AND SIMETHICONE: 200; 200; 20 SUSPENSION ORAL at 11:33

## 2022-12-13 ASSESSMENT — ENCOUNTER SYMPTOMS
COUGH: 0
NAUSEA: 1
SHORTNESS OF BREATH: 0
VOMITING: 0
BACK PAIN: 0
ABDOMINAL PAIN: 1
DIARRHEA: 0

## 2022-12-13 ASSESSMENT — PAIN - FUNCTIONAL ASSESSMENT: PAIN_FUNCTIONAL_ASSESSMENT: 0-10

## 2022-12-13 ASSESSMENT — PAIN DESCRIPTION - LOCATION: LOCATION: ABDOMEN

## 2022-12-13 ASSESSMENT — PAIN SCALES - GENERAL: PAINLEVEL_OUTOF10: 4

## 2022-12-13 NOTE — ED PROVIDER NOTES
[facility]  EMERGENCY DEPARTMENT ENCOUNTER      Pt Name: Calixto Rodriguez  MRN: [id number]  Birthdate 1990  Date of evaluation: 12/13/2022  Provider: Jerson Collins APRN - [id number] [place]       Chief Complaint   Patient presents with    Abdominal Pain     Mid-epigastric area pain, denies any nausea or emesis; did have some diarrhea over the weekend, tried taking ibuprofen for the pain with no relief         HISTORY OF PRESENT ILLNESS   (Location/Symptom, Timing/Onset, Context/Setting, Quality, Duration, Modifying Factors, Severity)  Note limiting factors. Calixto Rodriguez is a 29 y.o. female who presents to the emergency department with concern for epigastric pain without nausea or vomiting. Reports diarrhea over the weekend, but felt better prior to today when epigastric pain started. No similar previous. Tried ibuprofen without relief. Worse with movement, worst with touching. Not related to eating or drinking. Reports prior gall bladder removal, prior tubal and prior appendectomy. No obstruction history. HPI    Nursing Notes were reviewed. REVIEW OF SYSTEMS    (2-9 systems for level 4, 10 or more for level 5)     Review of Systems   Constitutional:  Negative for chills and fever. HENT:  Negative for congestion. Respiratory:  Negative for cough and shortness of breath. Cardiovascular:  Negative for chest pain, palpitations and leg swelling. Gastrointestinal:  Positive for abdominal pain and nausea. Negative for diarrhea and vomiting. Genitourinary:  Negative for dysuria, flank pain, frequency and urgency. Musculoskeletal:  Negative for back pain, myalgias and neck pain. Neurological:  Negative for dizziness, syncope, weakness, light-headedness and headaches. Except as noted above the remainder of the review of systems was reviewed and negative.        PAST MEDICAL HISTORY     Past Medical History:   Diagnosis Date    Anxiety     Depression, acute 6/6/2019 Hypertension     Tobacco dependence 5/3/2022         SURGICAL HISTORY       Past Surgical History:   Procedure Laterality Date    APPENDECTOMY       SECTION      CHOLECYSTECTOMY, LAPAROSCOPIC N/A 2022    ROBOTIC CHOLECYSTECTOMY WITH ICG performed by Suma Ritter MD at [address]       Discharge Medication List as of 2022 11:40 AM        CONTINUE these medications which have NOT CHANGED    Details   FLUoxetine (PROZAC) 20 MG capsule TAKE 1 CAPSULE BY MOUTH DAILY, Disp-30 capsule, R-1Normal      triamcinolone (KENALOG) 0.1 % cream Apply topically 2 times daily. , Disp-453.6 g, R-0, Normal             ALLERGIES     Penicillins    FAMILY HISTORY       Family History   Problem Relation Age of Onset    Depression Mother     Heart Attack Mother     High Blood Pressure Mother     Miscarriages / Jb Sizer Mother     Other Maternal Grandmother         heart failure          SOCIAL HISTORY       Social History     Socioeconomic History    Marital status: Single     Spouse name: None    Number of children: None    Years of education: None    Highest education level: None   Tobacco Use    Smoking status: Every Day     Packs/day: 0.50     Years: 10.00     Pack years: 5.00     Types: Cigarettes, E-Cigarettes    Smokeless tobacco: Never   Vaping Use    Vaping Use: Some days    Substances: Flavoring   Substance and Sexual Activity    Alcohol use:  Yes     Alcohol/week: 8.0 standard drinks     Types: 8 Cans of beer per week     Comment: occasional    Drug use: Yes     Types: Marijuana Jermain Perla    Sexual activity: Yes     Partners: Male     Social Determinants of Health     Physical Activity: Insufficiently Active    Days of Exercise per Week: 6 days    Minutes of Exercise per Session: 20 min   Intimate Partner Violence: Not At Risk    Fear of Current or Ex-Partner: No    Emotionally Abused: No    Physically Abused: No    Sexually Abused: No       SCREENINGS Jermaine Coma Scale  Eye Opening: Spontaneous  Best Verbal Response: Oriented  Best Motor Response: Obeys commands  Jermaine Coma Scale Score: 15                     CIWA Assessment  BP: 138/69  Heart Rate: 72                 PHYSICAL EXAM    (up to 7 for level 4, 8 or more for level 5)     ED Triage Vitals [12/13/22 1024]   BP Temp Temp Source Heart Rate Resp SpO2 Height Weight   (!) 142/95 98.2 °F (36.8 °C) Oral 79 18 97 % -- 245 lb (111.1 kg)       Physical Exam  Vitals reviewed. Constitutional:       General: She is not in acute distress. Appearance: She is well-developed. She is obese. She is not ill-appearing, toxic-appearing or diaphoretic. HENT:      Head: Normocephalic and atraumatic. Mouth/Throat:      Mouth: Mucous membranes are moist.   Eyes:      Extraocular Movements: Extraocular movements intact. Pupils: Pupils are equal, round, and reactive to light. Cardiovascular:      Rate and Rhythm: Normal rate and regular rhythm. Heart sounds: Normal heart sounds. Pulmonary:      Effort: Pulmonary effort is normal.      Breath sounds: Normal breath sounds. Abdominal:      General: Bowel sounds are normal.      Palpations: Abdomen is soft. Tenderness: There is abdominal tenderness in the epigastric area. There is no right CVA tenderness, left CVA tenderness, guarding or rebound. Skin:     General: Skin is warm and dry. Capillary Refill: Capillary refill takes less than 2 seconds. Neurological:      Mental Status: She is alert.        DIAGNOSTIC RESULTS     EKG: All EKG's are interpreted by the Emergency Department Physician who either signs or Co-signs this chart in the absence of a cardiologist.        RADIOLOGY:   Non-plain film images such as CT, Ultrasound and MRI are read by the radiologist. Plain radiographic images are visualized and preliminarily interpreted by the emergency physician with the below findings:        Interpretation per the Radiologist below, if available at the time of this note:    XR CHEST PORTABLE   Final Result   No acute cardiopulmonary process identified. ED BEDSIDE ULTRASOUND:   Performed by ED Physician - none    LABS:  Labs Reviewed   CBC WITH AUTO DIFFERENTIAL - Abnormal; Notable for the following components:       Result Value    MCHC 32.6 (*)     All other components within normal limits   URINALYSIS WITH REFLEX TO CULTURE - Abnormal; Notable for the following components:    Leukocyte Esterase, Urine TRACE (*)     All other components within normal limits   MICROSCOPIC URINALYSIS - Abnormal; Notable for the following components:    Bacteria, UA NEGATIVE (*)     Crystals, UA NEG (*)     WBC, UA 7 (*)     All other components within normal limits   COMPREHENSIVE METABOLIC PANEL   LIPASE   HCG, SERUM, QUALITATIVE       All other labs were within normal range or not returned as of this dictation. EMERGENCY DEPARTMENT COURSE and DIFFERENTIAL DIAGNOSIS/MDM:   Vitals:    Vitals:    12/13/22 1024 12/13/22 1045 12/13/22 1143   BP: (!) 142/95 (!) 152/76 138/69   Pulse: 79  72   Resp: 18  16   Temp: 98.2 °F (36.8 °C)  98.1 °F (36.7 °C)   TempSrc: Oral     SpO2: 97% 99% 99%   Weight: 245 lb (111.1 kg)             MDM        REASSESSMENT      Well appearing, nontoxic, hemodynamically stable. Labs reassuring. Discussed imaging versus observation with close outpatient follow up. Agreeable to deferring imaging now. Counseled to treatment follow up and return parameters. Discussed with Dr Bhanu Ludwig, in agreement. CRITICAL CARE TIME       CONSULTS:  None    PROCEDURES:  Unless otherwise noted below, none     Procedures         FINAL IMPRESSION      1.  Abdominal pain, epigastric          DISPOSITION/PLAN   DISPOSITION Decision To Discharge 12/13/2022 11:16:22 AM      PATIENT REFERRED TO:  Leah Jama MD  [id number] [facility]  [phone]    Call in 1 day      DISCHARGE MEDICATIONS:  Discharge Medication List as of 12/13/2022 11:40 AM        START taking these medications    Details   pantoprazole (PROTONIX) 20 MG tablet Take 1 tablet by mouth daily, Disp-30 tablet, R-0Normal           Controlled Substances Monitoring:     No flowsheet data found.     (Please note that portions of this note were completed with a voice recognition program.  Efforts were made to edit the dictations but occasionally words are mis-transcribed.)    GISELLE Pedraza CNP (electronically signed)  Attending Emergency Physician          GISELLE Pedraza CNP  12/13/22 1962

## 2022-12-16 ENCOUNTER — OFFICE VISIT (OUTPATIENT)
Dept: FAMILY MEDICINE CLINIC | Age: 32
End: 2022-12-16
Payer: MEDICAID

## 2022-12-16 VITALS
DIASTOLIC BLOOD PRESSURE: 72 MMHG | BODY MASS INDEX: 39.97 KG/M2 | TEMPERATURE: 97.4 F | SYSTOLIC BLOOD PRESSURE: 130 MMHG | WEIGHT: 255.2 LBS | OXYGEN SATURATION: 99 % | HEART RATE: 78 BPM

## 2022-12-16 DIAGNOSIS — F32.A ANXIETY AND DEPRESSION: ICD-10-CM

## 2022-12-16 DIAGNOSIS — R00.2 HEART PALPITATIONS: ICD-10-CM

## 2022-12-16 DIAGNOSIS — F41.9 ANXIETY AND DEPRESSION: ICD-10-CM

## 2022-12-16 DIAGNOSIS — R10.13 EPIGASTRIC PAIN: Primary | ICD-10-CM

## 2022-12-16 PROCEDURE — 99214 OFFICE O/P EST MOD 30 MIN: CPT | Performed by: FAMILY MEDICINE

## 2022-12-16 RX ORDER — FLUOXETINE HYDROCHLORIDE 20 MG/1
20 CAPSULE ORAL DAILY
Qty: 30 CAPSULE | Refills: 1 | Status: SHIPPED | OUTPATIENT
Start: 2022-12-16

## 2022-12-16 RX ORDER — SUCRALFATE 1 G/1
1 TABLET ORAL 3 TIMES DAILY
Qty: 90 TABLET | Refills: 1 | Status: SHIPPED | OUTPATIENT
Start: 2022-12-16

## 2022-12-16 SDOH — ECONOMIC STABILITY: FOOD INSECURITY: WITHIN THE PAST 12 MONTHS, THE FOOD YOU BOUGHT JUST DIDN'T LAST AND YOU DIDN'T HAVE MONEY TO GET MORE.: SOMETIMES TRUE

## 2022-12-16 SDOH — ECONOMIC STABILITY: FOOD INSECURITY: WITHIN THE PAST 12 MONTHS, YOU WORRIED THAT YOUR FOOD WOULD RUN OUT BEFORE YOU GOT MONEY TO BUY MORE.: SOMETIMES TRUE

## 2022-12-16 ASSESSMENT — ENCOUNTER SYMPTOMS
EYE DISCHARGE: 0
STRIDOR: 0
COLOR CHANGE: 0
FACIAL SWELLING: 0
NAUSEA: 0
BACK PAIN: 0
ABDOMINAL DISTENTION: 1
APNEA: 0
VOMITING: 0
EYE ITCHING: 0

## 2022-12-16 ASSESSMENT — SOCIAL DETERMINANTS OF HEALTH (SDOH): HOW HARD IS IT FOR YOU TO PAY FOR THE VERY BASICS LIKE FOOD, HOUSING, MEDICAL CARE, AND HEATING?: SOMEWHAT HARD

## 2022-12-16 NOTE — PROGRESS NOTES
[facility] PHYSICIAN SERVICES  [facility]  [address] [facility] [address] [address]  Dept: [phone]  Dept Fax: [phone]  Loc: [phone]    Subjective:     Roxie Jones is a 29 y.o. female who presents today for her medical conditions/complaints as noted below. Roxie Jones is c/o of Follow-up (Still having stomach pain as of Tuesday went to er and was proscribed Protonix not working  )    Kb Saunders from Last 3 Encounters:   22 255 lb 3.2 oz (115.8 kg)   22 245 lb (111.1 kg)   10/09/22 245 lb (111.1 kg)        HPI:   Patient presents for follow-up of palpitations, abdominal pain. She states the palpitations are about the same in frequency, 2-3 times a month if that. Anxiety stable on Prozac, needs refill. She did go to the ER  for the abdominal pain, epigastric. Notes and labs reviewed, she states they never discussed imaging with her. She was given prescription for Protonix which helps some. She is s/p cholecystectomy and appendectomy. She reports she has cut down on her drinking, is trying to lose weight. No additional n/v/d. PHQ Scores 5/3/2022   PHQ2 Score 0   PHQ9 Score 0     Interpretation of Total Score Depression Severity: 1-4 = Minimal depression, 5-9 = Mild depression, 10-14 = Moderate depression, 15-19 = Moderately severe depression, 20-27 = Severe depression     AVERY 7 SCORE 5/3/2022   AVERY-7 Total Score 19     Interpretation of AVERY-7 score: 5-9 = mild anxiety, 10-14 = moderate anxiety, 15+ = severe anxiety. Recommend referral to behavioral health for scores 10 or greater.      Past Medical History:   Diagnosis Date    Anxiety     Depression, acute 2019    Hypertension     Tobacco dependence 5/3/2022     Past Surgical History:   Procedure Laterality Date    APPENDECTOMY       SECTION      CHOLECYSTECTOMY, LAPAROSCOPIC N/A 2022    ROBOTIC CHOLECYSTECTOMY WITH ICG performed by Jeferson Sánchez MD at [address] [place] [address] TUBAL LIGATION         Family History   Problem Relation Age of Onset    Depression Mother     Heart Attack Mother     High Blood Pressure Mother     Miscarriages / Zoila Dukes Mother     Other Maternal Grandmother         heart failure       Social History     Tobacco Use    Smoking status: Every Day     Packs/day: 0.50     Years: 10.00     Pack years: 5.00     Types: Cigarettes, E-Cigarettes    Smokeless tobacco: Never   Substance Use Topics    Alcohol use: Yes     Alcohol/week: 8.0 standard drinks     Types: 8 Cans of beer per week     Comment: occasional      Current Outpatient Medications   Medication Sig Dispense Refill    FLUoxetine (PROZAC) 20 MG capsule Take 1 capsule by mouth daily 30 capsule 1    sucralfate (CARAFATE) 1 GM tablet Take 1 tablet by mouth in the morning, at noon, and at bedtime 90 tablet 1    pantoprazole (PROTONIX) 20 MG tablet Take 1 tablet by mouth daily 30 tablet 0    triamcinolone (KENALOG) 0.1 % cream Apply topically 2 times daily. 453.6 g 0     No current facility-administered medications for this visit. Allergies   Allergen Reactions    Penicillins Anaphylaxis       Review of Systems   Constitutional:  Negative for chills and fever. HENT:  Negative for facial swelling and mouth sores. Eyes:  Negative for discharge and itching. Respiratory:  Negative for apnea and stridor. Cardiovascular:  Negative for chest pain and palpitations. Gastrointestinal:  Positive for abdominal distention. Negative for nausea and vomiting. Endocrine: Negative for cold intolerance and heat intolerance. Genitourinary:  Negative for frequency and urgency. Musculoskeletal:  Negative for arthralgias and back pain. Skin:  Negative for color change and rash. See HPI for visit specific review of symptoms.   All others negative      Objective:   /72   Pulse 78   Temp 97.4 °F (36.3 °C)   Wt 255 lb 3.2 oz (115.8 kg)   SpO2 99%   BMI 39.97 kg/m²   Physical Exam  Constitutional:       Appearance: She is well-developed. HENT:      Head: Normocephalic and atraumatic. Right Ear: External ear normal.      Left Ear: External ear normal.   Eyes:      Conjunctiva/sclera: Conjunctivae normal.      Pupils: Pupils are equal, round, and reactive to light. Cardiovascular:      Rate and Rhythm: Normal rate and regular rhythm. Heart sounds: Normal heart sounds. Pulmonary:      Effort: Pulmonary effort is normal. No respiratory distress. Breath sounds: Normal breath sounds. Abdominal:      General: Bowel sounds are normal. There is no distension. Palpations: Abdomen is soft. Tenderness: There is abdominal tenderness in the epigastric area. Musculoskeletal:         General: Normal range of motion. Cervical back: Normal range of motion and neck supple. Skin:     General: Skin is warm. Capillary Refill: Capillary refill takes less than 2 seconds. Findings: No rash. Neurological:      Mental Status: She is alert and oriented to person, place, and time. Cranial Nerves: No cranial nerve deficit. Psychiatric:         Thought Content:  Thought content normal.         Lab Review   Recent Results (from the past 672 hour(s))   CBC with Auto Differential    Collection Time: 12/13/22 10:35 AM   Result Value Ref Range    WBC 7.7 4.8 - 10.8 K/uL    RBC 4.26 4.20 - 5.40 M/uL    Hemoglobin 12.5 12.0 - 16.0 g/dL    Hematocrit 38.3 37.0 - 47.0 %    MCV 89.9 81.0 - 99.0 fL    MCH 29.3 27.0 - 31.0 pg    MCHC 32.6 (L) 33.0 - 37.0 g/dL    RDW 14.2 11.5 - 14.5 %    Platelets 406 791 - 208 K/uL    MPV 11.4 9.4 - 12.3 fL    Neutrophils % 56.4 50.0 - 65.0 %    Lymphocytes % 33.2 20.0 - 40.0 %    Monocytes % 5.5 0.0 - 10.0 %    Eosinophils % 3.9 0.0 - 5.0 %    Basophils % 0.9 0.0 - 1.0 %    Neutrophils Absolute 4.3 1.5 - 7.5 K/uL    Immature Granulocytes # 0.0 K/uL    Lymphocytes Absolute 2.6 1.1 - 4.5 K/uL    Monocytes Absolute 0.40 0.00 - 0.90 K/uL Eosinophils Absolute 0.30 0.00 - 0.60 K/uL    Basophils Absolute 0.10 0.00 - 0.20 K/uL   Comprehensive Metabolic Panel    Collection Time: 12/13/22 10:35 AM   Result Value Ref Range    Sodium 140 136 - 145 mmol/L    Potassium 4.2 3.5 - 5.0 mmol/L    Chloride 108 98 - 111 mmol/L    CO2 23 22 - 29 mmol/L    Anion Gap 9 7 - 19 mmol/L    Glucose 98 74 - 109 mg/dL    BUN 13 6 - 20 mg/dL    Creatinine 0.7 0.5 - 0.9 mg/dL    Est, Glom Filt Rate >60 >60    Calcium 9.4 8.6 - 10.0 mg/dL    Total Protein 7.3 6.6 - 8.7 g/dL    Albumin 4.2 3.5 - 5.2 g/dL    Total Bilirubin <0.2 0.2 - 1.2 mg/dL    Alkaline Phosphatase 70 35 - 104 U/L    ALT 14 5 - 33 U/L    AST 12 5 - 32 U/L   Lipase    Collection Time: 12/13/22 10:35 AM   Result Value Ref Range    Lipase 31 13 - 60 U/L   HCG Qualitative, Serum    Collection Time: 12/13/22 10:35 AM   Result Value Ref Range    hCG Qual Negative    Urinalysis with Reflex to Culture    Collection Time: 12/13/22 10:39 AM    Specimen: Urine   Result Value Ref Range    Color, UA YELLOW Straw/Yellow    Clarity, UA Clear Clear    Glucose, Ur Negative Negative mg/dL    Bilirubin Urine Negative Negative    Ketones, Urine Negative Negative mg/dL    Specific Gravity, UA 1.011 1.005 - 1.030    Blood, Urine Negative Negative    pH, UA 6.0 5.0 - 8.0    Protein, UA Negative Negative mg/dL    Urobilinogen, Urine 0.2 <2.0 E.U./dL    Nitrite, Urine Negative Negative    Leukocyte Esterase, Urine TRACE (A) Negative   Microscopic Urinalysis    Collection Time: 12/13/22 10:39 AM   Result Value Ref Range    Bacteria, UA NEGATIVE (A) None Seen /HPF    Crystals, UA NEG (A) None Seen /HPF    Hyaline Casts, UA 0 0 - 8 /HPF    WBC, UA 7 (H) 0 - 5 /HPF    RBC, UA 1 0 - 4 /HPF    Epithelial Cells, UA 5 0 - 5 /HPF               Assessment & Plan:      The following diagnoses and conditions are stable with no further orders unless indicated:    Patient Active Problem List    Diagnosis Date Noted    Epigastric pain 12/16/2022 Priority: Medium     Overview Note:     Continue Protonix, will add Carafate for possible gastritis/PUD. I If persists, will consider CT scan and/or referral to GI. Heart palpitations 12/16/2022     Priority: Medium     Overview Note:     Stable, previous EKG was unremarkable, monitor. Tobacco dependence 05/03/2022     Priority: Medium    Elevated blood pressure reading without diagnosis of hypertension 05/03/2022     Priority: Medium    Gallstones 05/01/2022     Priority: Medium    M[facility] 06/06/2019    Weakness 11/02/2018    Numbness 11/02/2018    Anxiety and depression 11/02/2018    Gait abnormality 11/02/2018        Varun Shin was seen today for follow-up. Diagnoses and all orders for this visit:    Epigastric pain  -     sucralfate (CARAFATE) 1 GM tablet; Take 1 tablet by mouth in the morning, at noon, and at bedtime    Anxiety and depression  -     FLUoxetine (PROZAC) 20 MG capsule; Take 1 capsule by mouth daily    Heart palpitations      Health Maintenance   Topic Date Due    COVID-19 Vaccine (1) Never done    Varicella vaccine (1 of 2 - 2-dose childhood series) Never done    Pneumococcal 0-64 years Vaccine (1 - PCV) Never done    DTaP/Tdap/Td vaccine (1 - Tdap) Never done    Flu vaccine (1) Never done    Cervical cancer screen  Never done    Depression Monitoring  05/03/2023    Hepatitis C screen  Completed    HIV screen  Completed    Hepatitis A vaccine  Aged Out    Hib vaccine  Aged Out    Meningococcal (ACWY) vaccine  Aged Out         Return in about 1 month (around 1/16/2023) for abdominal pain, in office. Discussed use, benefit, and side effects of prescribed medications. All patient questions answered. Pt voiced understanding. Reviewed health maintenance. Instructed to continue current medications, diet and exercise. Patient agreedwith treatment plan. Follow up as directed. Old records reviewed, where available.     JENNIFER Kelley Pierre MD    Note:  dictated using Dragon software

## 2022-12-18 ENCOUNTER — OFFICE VISIT (OUTPATIENT)
Age: 32
End: 2022-12-18

## 2022-12-18 VITALS
DIASTOLIC BLOOD PRESSURE: 80 MMHG | SYSTOLIC BLOOD PRESSURE: 122 MMHG | TEMPERATURE: 98.3 F | WEIGHT: 251 LBS | HEART RATE: 100 BPM | BODY MASS INDEX: 39.31 KG/M2 | OXYGEN SATURATION: 99 % | RESPIRATION RATE: 18 BRPM

## 2022-12-18 DIAGNOSIS — J02.0 STREP PHARYNGITIS: Primary | ICD-10-CM

## 2022-12-18 DIAGNOSIS — R05.9 COUGH, UNSPECIFIED TYPE: ICD-10-CM

## 2022-12-18 LAB
INFLUENZA A ANTIBODY: NORMAL
INFLUENZA B ANTIBODY: NORMAL
S PYO AG THROAT QL: POSITIVE

## 2022-12-18 RX ORDER — CEFDINIR 300 MG/1
300 CAPSULE ORAL 2 TIMES DAILY
Qty: 20 CAPSULE | Refills: 0 | Status: SHIPPED | OUTPATIENT
Start: 2022-12-18 | End: 2022-12-28

## 2022-12-18 ASSESSMENT — ENCOUNTER SYMPTOMS
SORE THROAT: 1
SHORTNESS OF BREATH: 1

## 2022-12-18 NOTE — PROGRESS NOTES
[address]  [address] [facility] [address] [address]  Dept: [phone]  Dept Fax: [phone]  Loc: [phone]    Fatmata Sellers is a 29 y.o. female who presents today for her medical conditions/complaints as noted below. Fatmata Sellers is c/o of Pharyngitis, Shortness of Breath, Chills, and Generalized Body Aches        HPI:     Fatmata Sellers presents with complaints of sore throat, shortness of breath, chills and body aches. Symptoms began 2 days ago. OTC treatment includes dayquil and cold and flu medication. Denies recent steroids, had antibiotics in October for dental abscess. Denies recent covid19 infection. Vaccinated against [id number]. Past Medical History:   Diagnosis Date    Anxiety     Depression, acute 2019    Hypertension     Tobacco dependence 5/3/2022     Past Surgical History:   Procedure Laterality Date    APPENDECTOMY       SECTION      CHOLECYSTECTOMY, LAPAROSCOPIC N/A 2022    ROBOTIC CHOLECYSTECTOMY WITH ICG performed by Shayy Orozco MD at [address]         Family History   Problem Relation Age of Onset    Depression Mother     Heart Attack Mother     High Blood Pressure Mother     Miscarriages / [place] Mother     Other Maternal Grandmother         heart failure       Social History     Tobacco Use    Smoking status: Every Day     Packs/day: 0.50     Years: 10.00     Pack years: 5.00     Types: Cigarettes, E-Cigarettes    Smokeless tobacco: Never   Substance Use Topics    Alcohol use:  Yes     Alcohol/week: 8.0 standard drinks     Types: 8 Cans of beer per week     Comment: occasional      Current Outpatient Medications   Medication Sig Dispense Refill    cefdinir (OMNICEF) 300 MG capsule Take 1 capsule by mouth 2 times daily for 10 days 20 capsule 0    FLUoxetine (PROZAC) 20 MG capsule Take 1 capsule by mouth daily 30 capsule 1    sucralfate (CARAFATE) 1 GM tablet Take 1 tablet by mouth in the morning, at noon, and at bedtime 90 tablet 1    pantoprazole (PROTONIX) 20 MG tablet Take 1 tablet by mouth daily 30 tablet 0    triamcinolone (KENALOG) 0.1 % cream Apply topically 2 times daily. (Patient not taking: Reported on 12/18/2022) 453.6 g 0     No current facility-administered medications for this visit. Allergies   Allergen Reactions    Penicillins Anaphylaxis       Health Maintenance   Topic Date Due    COVID-19 Vaccine (1) Never done    Varicella vaccine (1 of 2 - 2-dose childhood series) Never done    Pneumococcal 0-64 years Vaccine (1 - PCV) Never done    DTaP/Tdap/Td vaccine (1 - Tdap) Never done    Flu vaccine (1) Never done    Cervical cancer screen  Never done    Depression Monitoring  05/03/2023    Hepatitis C screen  Completed    HIV screen  Completed    Hepatitis A vaccine  Aged Out    Hib vaccine  Aged Out    Meningococcal (ACWY) vaccine  Aged Out       Subjective:     Review of Systems   Constitutional:  Positive for chills. HENT:  Positive for sore throat. Respiratory:  Positive for shortness of breath. Musculoskeletal:  Positive for myalgias.     :Objective      Physical Exam  Constitutional:       General: She is not in acute distress. Appearance: Normal appearance. She is ill-appearing. She is not toxic-appearing. HENT:      Head: Normocephalic and atraumatic. Right Ear: Tympanic membrane, ear canal and external ear normal.      Left Ear: Ear canal and external ear normal. Tympanic membrane is erythematous (dull TM). Nose: Congestion present. Mouth/Throat:      Mouth: Mucous membranes are moist.      Pharynx: Oropharynx is clear. Posterior oropharyngeal erythema present. No oropharyngeal exudate. Tonsils: Tonsillar exudate present. 2+ on the right. 2+ on the left. Eyes:      General:         Right eye: No discharge. Left eye: No discharge.       Conjunctiva/sclera: Conjunctivae normal.   Cardiovascular:      Rate and Rhythm: Normal rate and regular rhythm. Pulmonary:      Effort: Pulmonary effort is normal. No respiratory distress. Abdominal:      General: Abdomen is flat. Palpations: Abdomen is soft. Musculoskeletal:         General: Normal range of motion. Cervical back: Normal range of motion. Lymphadenopathy:      Cervical: No cervical adenopathy. Skin:     General: Skin is warm and dry. Capillary Refill: Capillary refill takes less than 2 seconds. Findings: No rash. Neurological:      General: No focal deficit present. Mental Status: She is alert. Psychiatric:         Mood and Affect: Mood normal.     /80   Pulse 100   Temp 98.3 °F (36.8 °C) (Temporal)   Resp 18   Wt 251 lb (113.9 kg)   SpO2 99%   BMI 39.31 kg/m²     :Assessment       Diagnosis Orders   1. Strep pharyngitis  POCT rapid strep A    cefdinir (OMNICEF) 300 MG capsule      2. Cough, unspecified type  POCT Influenza A/B          :Plan   Strep +. Plan to cover with omnicef. Pt has had in the past and states she tolerated. Continue supportive care. Return precautions and home care education completed. Patient verbalized understanding. Orders Placed This Encounter   Procedures    POCT rapid strep A    POCT Influenza A/B     Results for orders placed or performed in visit on 12/18/22   POCT rapid strep A   Result Value Ref Range    Strep A Ag Positive (A) None Detected   POCT Influenza A/B   Result Value Ref Range    Influenza A Ab Neg     Influenza B Ab Neg        No follow-ups on file. Orders Placed This Encounter   Medications    cefdinir (OMNICEF) 300 MG capsule     Sig: Take 1 capsule by mouth 2 times daily for 10 days     Dispense:  20 capsule     Refill:  0       Patient given educational materials- see patient instructions. Discussed use, benefit, and side effects of prescribed medications. All patient questions answered. Pt voiced understanding. Patient Instructions   1.  Antibiotics for full 10 days  2. Increase water intake  3. Stay home until fever free for 24 hours  4. Throw away toothbrush after 3rd full day of antibiotic  5. Avoid sharing drinks or food for at least 48 hours. 6. Monitor for rash, vomiting with inability to hold down medication or high fever that won't break - return or contact PCP if they occur  7.  Warm salt water gargles or 1 teaspoon of honey every 4 hours for sore throat      Electronically signed by GISELLE Willingham CNP on 12/18/2022 at 2:53 PM

## 2023-04-04 ENCOUNTER — OFFICE VISIT (OUTPATIENT)
Age: 33
End: 2023-04-04
Payer: MEDICAID

## 2023-04-04 VITALS
WEIGHT: 250 LBS | OXYGEN SATURATION: 98 % | RESPIRATION RATE: 20 BRPM | SYSTOLIC BLOOD PRESSURE: 124 MMHG | HEART RATE: 82 BPM | BODY MASS INDEX: 39.24 KG/M2 | TEMPERATURE: 97.2 F | DIASTOLIC BLOOD PRESSURE: 70 MMHG | HEIGHT: 67 IN

## 2023-04-04 DIAGNOSIS — H60.392 OTHER INFECTIVE ACUTE OTITIS EXTERNA OF LEFT EAR: Primary | ICD-10-CM

## 2023-04-04 PROCEDURE — 99213 OFFICE O/P EST LOW 20 MIN: CPT

## 2023-04-04 RX ORDER — CEPHALEXIN 500 MG/1
500 CAPSULE ORAL 2 TIMES DAILY
Qty: 20 CAPSULE | Refills: 0 | Status: SHIPPED | OUTPATIENT
Start: 2023-04-04 | End: 2023-04-14

## 2023-04-04 RX ORDER — OFLOXACIN 3 MG/ML
5 SOLUTION AURICULAR (OTIC) 2 TIMES DAILY
Qty: 5 ML | Refills: 0 | Status: SHIPPED | OUTPATIENT
Start: 2023-04-04 | End: 2023-04-14

## 2023-04-04 NOTE — PROGRESS NOTES
patient questions answered. Pt voiced understanding. There are no Patient Instructions on file for this visit.       Electronically signed by GISELLE Oscar CNP on 4/4/2023 at 5:30 PM

## 2023-04-04 NOTE — PATIENT INSTRUCTIONS
Antibiotic with food for full 10 days. Drops twice daily for 10 days as well  Ibuprofen as needed for pain/fever  May use over the counter cough/cold medications for congestion or runny nose  May use \"warmie\" or warm gel packs to apply heat to the ear. Lying on one of these may ease pain temporarily.  Be cautious of burns  Monitor for failure to improve by the 3rd day on antibiotics and seek reevaluation if needed

## 2023-06-03 ENCOUNTER — HOSPITAL ENCOUNTER (EMERGENCY)
Facility: HOSPITAL | Age: 33
Discharge: HOME | End: 2023-06-04
Payer: COMMERCIAL

## 2023-06-03 DIAGNOSIS — K02.9 PAIN DUE TO DENTAL CARIES: Primary | ICD-10-CM

## 2023-06-03 PROCEDURE — 99283 EMERGENCY DEPT VISIT LOW MDM: CPT

## 2023-06-04 VITALS
HEIGHT: 67 IN | BODY MASS INDEX: 37.98 KG/M2 | DIASTOLIC BLOOD PRESSURE: 83 MMHG | WEIGHT: 242 LBS | HEART RATE: 83 BPM | SYSTOLIC BLOOD PRESSURE: 130 MMHG | RESPIRATION RATE: 18 BRPM | OXYGEN SATURATION: 100 % | TEMPERATURE: 98.3 F

## 2023-06-04 PROCEDURE — 25010000002 DEXAMETHASONE PER 1 MG: Performed by: PHYSICIAN ASSISTANT

## 2023-06-04 PROCEDURE — 96372 THER/PROPH/DIAG INJ SC/IM: CPT

## 2023-06-04 RX ORDER — NAPROXEN 500 MG/1
500 TABLET ORAL 2 TIMES DAILY PRN
Qty: 10 TABLET | Refills: 0 | Status: SHIPPED | OUTPATIENT
Start: 2023-06-04

## 2023-06-04 RX ORDER — CLINDAMYCIN HYDROCHLORIDE 300 MG/1
300 CAPSULE ORAL EVERY 6 HOURS
Qty: 40 CAPSULE | Refills: 0 | Status: SHIPPED | OUTPATIENT
Start: 2023-06-04 | End: 2023-06-14

## 2023-06-04 RX ORDER — DEXAMETHASONE SODIUM PHOSPHATE 10 MG/ML
6 INJECTION INTRAMUSCULAR; INTRAVENOUS ONCE
Status: COMPLETED | OUTPATIENT
Start: 2023-06-04 | End: 2023-06-04

## 2023-06-04 RX ORDER — CHLORHEXIDINE GLUCONATE 0.12 MG/ML
15 RINSE ORAL 4 TIMES DAILY
Qty: 118 ML | Refills: 0 | Status: SHIPPED | OUTPATIENT
Start: 2023-06-04

## 2023-06-04 RX ORDER — ACETAMINOPHEN 500 MG
1000 TABLET ORAL ONCE
Status: COMPLETED | OUTPATIENT
Start: 2023-06-04 | End: 2023-06-04

## 2023-06-04 RX ORDER — KETOROLAC TROMETHAMINE 10 MG/1
10 TABLET, FILM COATED ORAL ONCE
Status: COMPLETED | OUTPATIENT
Start: 2023-06-04 | End: 2023-06-04

## 2023-06-04 RX ADMIN — TOPICAL ANESTHETIC 2 SPRAY: 200 SPRAY DENTAL; PERIODONTAL at 00:13

## 2023-06-04 RX ADMIN — ACETAMINOPHEN 1000 MG: 500 TABLET, FILM COATED ORAL at 00:16

## 2023-06-04 RX ADMIN — DEXAMETHASONE SODIUM PHOSPHATE 6 MG: 10 INJECTION INTRAMUSCULAR; INTRAVENOUS at 00:16

## 2023-06-04 RX ADMIN — KETOROLAC TROMETHAMINE 10 MG: 10 TABLET, FILM COATED ORAL at 00:24

## 2023-06-04 NOTE — ED PROVIDER NOTES
"Subjective   History of Present Illness    Patient is a 32-year-old female presenting to ED with dental pain.  PMH significant for ADHD, anxiety, hypertension, PTSD, known dental caries.  Patient states \"a long time ago\" she developed multiple broken teeth for which she will intermittently have problems and is concerned that she may require a dose of antibiotics.  Patient states that a couple days ago she developed sudden increase intense pain to a left lower tooth which has persisted despite use of Motrin and Tylenol with her last dose around 5 PM.  Patient denies fevers, chills, diaphoresis, any posterior oropharynx abnormalities or difficulty swallowing.  Patient states that due to her insurance over the past year she has had difficulty following up with a dentist and has been unable to find anyone to take her tooth out in a financially affordable manner however she has been advised that \"they will cut the tooth out.\"  Patient denies any dental trauma or other injuries and presents at this time for further evaluation.    Records reviewed show patient last seen in the ED on 5/1/2022 for gallstones, enteritis, UTI.    Patient last seen for dental concerns in the ED on 11/4/2021.    Patient's last outpatient visit in urgent care on 4/11/2022 for tonsillitis, acute otitis media, sore throat.    Review of Systems   Constitutional: Negative.  Negative for chills, diaphoresis and fever.   HENT:  Positive for dental problem (left lower). Negative for drooling, ear pain, facial swelling, sinus pressure, sore throat and trouble swallowing.    Eyes: Negative.    Respiratory: Negative.     Cardiovascular: Negative.    Gastrointestinal: Negative.  Negative for abdominal pain, nausea and vomiting.   Genitourinary: Negative.    Musculoskeletal: Negative.  Negative for neck pain and neck stiffness.   Skin: Negative.    Allergic/Immunologic: Negative for immunocompromised state.   Neurological: Negative.  " Pre-Surgery Instructions:   Medication Instructions    amoxicillin-clavulanate (AUGMENTIN) 875-125 mg per tablet Instructed patient per Anesthesia Guidelines   ascorbic acid (VITAMIN C) 250 mg tablet Instructed patient per Anesthesia Guidelines   aspirin (ECOTRIN LOW STRENGTH) 81 mg EC tablet Patient was instructed by Physician and understands   atorvastatin (LIPITOR) 40 mg tablet Patient was instructed per "E-Preop"    cyanocobalamin (VITAMIN B-12) 1,000 mcg tablet Instructed patient per Anesthesia Guidelines   MAGNESIUM PO Instructed patient per Anesthesia Guidelines   Misc Natural Products (TURMERIC CURCUMIN) CAPS Instructed patient per Anesthesia Guidelines   Vitamin D, Cholecalciferol, 1000 units CAPS Instructed patient per Anesthesia Guidelines      Per dr Rabia Amin ov note pt is to remain on aspirin prior to surgery to TIA history   Psychiatric/Behavioral: Negative.     All other systems reviewed and are negative.    Past Medical History:   Diagnosis Date    ADD (attention deficit disorder)     Anxiety     Hypertension     PTSD (post-traumatic stress disorder)        Allergies   Allergen Reactions    Penicillins Anaphylaxis       Past Surgical History:   Procedure Laterality Date    APPENDECTOMY       SECTION      CHOLECYSTECTOMY         Family History   Problem Relation Age of Onset    No Known Problems Mother     No Known Problems Father     No Known Problems Sister     No Known Problems Brother     No Known Problems Son     No Known Problems Daughter     No Known Problems Maternal Grandmother     No Known Problems Maternal Grandfather     No Known Problems Paternal Grandmother     No Known Problems Paternal Grandfather     No Known Problems Cousin     Rheum arthritis Neg Hx     Osteoarthritis Neg Hx     Asthma Neg Hx     Diabetes Neg Hx     Heart failure Neg Hx     Hyperlipidemia Neg Hx     Hypertension Neg Hx     Migraines Neg Hx     Rashes / Skin problems Neg Hx     Seizures Neg Hx     Stroke Neg Hx     Thyroid disease Neg Hx        Social History     Socioeconomic History    Marital status: Single   Tobacco Use    Smoking status: Every Day    Smokeless tobacco: Never   Substance and Sexual Activity    Alcohol use: Yes     Comment: social    Drug use: Yes     Types: Marijuana     Comment: social    Sexual activity: Defer           Objective   Physical Exam  Vitals and nursing note reviewed.   Constitutional:       General: She is in acute distress (appears uncomfortable due to pain).      Appearance: Normal appearance. She is not ill-appearing, toxic-appearing or diaphoretic.   HENT:      Head: Normocephalic.      Right Ear: Tympanic membrane, ear canal and external ear normal.      Left Ear: Tympanic membrane, ear canal and external ear normal.      Nose: Nose normal.      Mouth/Throat:      Mouth: Mucous membranes are moist.       Pharynx: Oropharynx is clear.      Comments: Evidence of previous dental extraction to all maxillary teeth.  Mandibular teeth with significant dental caries including remaining left lower posterior most mandibular tooth with missing lateral three quarters.  Remaining portion of tooth with dental caries, gingival erythema, tenderness to palpitation however no drainable dental abscess.  Remainder of teeth with no further areas of localized abscess.  Normal inspection of roof and floor of mouth including no evidence of Smith angina.  Normal inspection of posterior oropharynx.  No drooling or pooling of secretions.  Eyes:      Extraocular Movements: Extraocular movements intact.      Conjunctiva/sclera: Conjunctivae normal.      Pupils: Pupils are equal, round, and reactive to light.   Cardiovascular:      Rate and Rhythm: Normal rate and regular rhythm.   Pulmonary:      Effort: Pulmonary effort is normal.      Breath sounds: Normal breath sounds.   Abdominal:      General: Bowel sounds are normal.      Palpations: Abdomen is soft.   Musculoskeletal:         General: Normal range of motion.      Cervical back: Normal range of motion and neck supple. No rigidity or tenderness.   Skin:     General: Skin is warm and dry.   Neurological:      Mental Status: She is alert and oriented to person, place, and time.      Gait: Gait normal.   Psychiatric:         Mood and Affect: Mood is anxious. Affect is tearful.         Speech: Speech normal.         Behavior: Behavior normal. Behavior is cooperative.       Procedures           ED Course                                           Medical Decision Making  Problems Addressed:  Pain due to dental caries: complicated acute illness or injury    Risk  OTC drugs.  Prescription drug management.        Patient is a 32-year-old female presenting to ED with dental pain.  PMH significant for ADHD, anxiety, hypertension, PTSD, known dental caries.  Upon evaluation evidence of significant  dental caries to patient's remaining mandibular teeth with all of maxillary teeth previously extracted.  No evidence of drainable abscess.  Normal inspection of roof and floor mouth including no evidence of Smith angina.  No abnormalities to the posterior oropharynx including no tonsillar hypertrophy/erythema/exudates.  Neck with full active range of motion, supple, and no evidence of rigidity or meningismus.  Patient is able to tolerate secretions without difficulty including no pooling or drooling.  Vital signs remained stable including afebrile status and no evidence of tachycardia.  Patient was unable to find a ride home for which she was advised she could not be given narcotics in the ER however after dose of Toradol, Tylenol, and HurriCaine spray patient had improvement in her pain and discomfort.  Patient given a dose of Decadron and advised on need for outpatient antibiotics.  However also iterated importance of Peridex mouthwash, warm water salt gargles.  Discussed significant importance of following up with a dentist for definitive care and provided patient numerous resources in the local [place] area.  Advised that she can also call her insurance for further assistance.  Discussed with patient strict return precautions and need for immediate return to ED should she develop any new or worsening symptoms.  Patient is very appreciative with no further questions, concerns, at this time and is stable for discharge.     Differential diagnosis: Dental abscess, dental caries, fractured tooth, intraoral lesion, pharyngitis, Smith angina.    Final diagnoses:   Pain due to dental caries       ED Disposition  ED Disposition       ED Disposition   Discharge    Condition   Stable    Comment   --               Provider, No Known  [facility]  [place] [id number]    Schedule an appointment as soon as possible for a visit in 2 days      PATIENT CONNECTION - [facility]  [address]  [phone]  Schedule an appointment as soon as possible for a visit in 2 days       DENTISTRY [facility]  [address]  [place] [id number] [phone]  Schedule an appointment as soon as possible for a visit in 2 days      [facility] Emergency Department  [address] [facility] [address] [facility] [address] [phone]    As needed    [facility]  [address]  [address]  [facility] [address]    [phone]  Schedule an appointment as soon as possible for a visit       [facility]  [address]  [address]  [facility] [address]    [phone]             Medication List        New Prescriptions      chlorhexidine 0.12 % solution  Commonly known as: PERIDEX  Apply 15 mL to the mouth or throat 4 (Four) Times a Day.     clindamycin 300 MG capsule  Commonly known as: CLEOCIN  Take 1 capsule by mouth Every 6 (Six) Hours for 10 days.     naproxen 500 MG tablet  Commonly known as: NAPROSYN  Take 1 tablet by mouth 2 (Two) Times a Day As Needed for Mild Pain.               Where to Get Your Medications        These medications were sent to [facility] [address] [place] [address] [address] [facility] - [phone]  - [phone] [place] [id number]      Phone: [phone]   chlorhexidine 0.12 % solution  clindamycin 300 MG capsule  naproxen 500 MG tablet            Yeison Lomeli PA-C  06/04/23 0030

## 2023-06-04 NOTE — DISCHARGE INSTRUCTIONS
Please complete your antibiotics in their entirety even if you begin to feel better.  Please do warm water salt gargles 3 times a day and do Peridex mouthwashes twice a day.  Please continue to use over-the-counter medications or the naproxen for pain and discomfort.  Please see below for local dental clinics in the area or continue to work with your insurance to find a dentist as we discussed this tooth will likely need to be removed.  You will need to follow-up with your primary care provider or dentist within the next 48 hours for reevaluation however should you develop any new or worsening symptoms please return to the ER for further evaluation.

## 2023-06-21 ENCOUNTER — OFFICE VISIT (OUTPATIENT)
Age: 33
End: 2023-06-21
Payer: MEDICAID

## 2023-06-21 VITALS
BODY MASS INDEX: 23.34 KG/M2 | OXYGEN SATURATION: 99 % | TEMPERATURE: 97.4 F | HEART RATE: 81 BPM | SYSTOLIC BLOOD PRESSURE: 144 MMHG | RESPIRATION RATE: 18 BRPM | DIASTOLIC BLOOD PRESSURE: 86 MMHG | WEIGHT: 149 LBS

## 2023-06-21 DIAGNOSIS — T16.1XXA FOREIGN BODY OF RIGHT EAR, INITIAL ENCOUNTER: Primary | ICD-10-CM

## 2023-06-21 PROCEDURE — 99213 OFFICE O/P EST LOW 20 MIN: CPT | Performed by: NURSE PRACTITIONER

## 2023-06-21 ASSESSMENT — ENCOUNTER SYMPTOMS
EYES NEGATIVE: 1
GASTROINTESTINAL NEGATIVE: 1
COLOR CHANGE: 0

## 2023-06-21 NOTE — PROGRESS NOTES
[address]  [address] [facility] [address] [address]  Dept: [phone]  Dept Fax: [phone]  Loc: [phone]     Lyudmila Obrien is a 29 y.o. female who presents today for her medical conditions/complaintsas noted below. Lyudmila Obrien is c/o of Foreign Body in Ear (Left ear)        HPI:     Foreign Body in Ear  The incident occurred 6 to 12 hours ago. Suspected object: cotton. The foreign body is suspected to be in the left ear. The incident was witnessed/reported by The patient. Pertinent negatives include no fever or hearing loss. Past Medical History:   Diagnosis Date    Anxiety     Depression, acute 2019    Hypertension     Tobacco dependence 5/3/2022      Past Surgical History:   Procedure Laterality Date    APPENDECTOMY       SECTION      CHOLECYSTECTOMY, LAPAROSCOPIC N/A 2022    ROBOTIC CHOLECYSTECTOMY WITH ICG performed by Taran Gunn MD at [address] [place] [address]         Family History   Problem Relation Age of Onset    Depression Mother     Heart Attack Mother     High Blood Pressure Mother     Miscarriages / [place] Mother     Other Maternal Grandmother         heart failure       Social History     Tobacco Use    Smoking status: Every Day     Packs/day: 0.50     Years: 10.00     Pack years: 5.00     Types: Cigarettes, E-Cigarettes    Smokeless tobacco: Never   Substance Use Topics    Alcohol use: Yes     Alcohol/week: 8.0 standard drinks     Types: 8 Cans of beer per week     Comment: occasional      Current Outpatient Medications   Medication Sig Dispense Refill    FLUoxetine (PROZAC) 20 MG capsule Take 1 capsule by mouth daily 30 capsule 1    sucralfate (CARAFATE) 1 GM tablet Take 1 tablet by mouth in the morning, at noon, and at bedtime 90 tablet 1    pantoprazole (PROTONIX) 20 MG tablet Take 1 tablet by mouth daily 30 tablet 0    triamcinolone (KENALOG) 0.1 % cream Apply topically 2 times daily.

## 2023-06-21 NOTE — PATIENT INSTRUCTIONS
Apply debrox daily  Follow up with [facility] if symptoms worsen or fail to improve.

## 2023-07-02 ENCOUNTER — HOSPITAL ENCOUNTER (EMERGENCY)
Age: 33
Discharge: HOME | End: 2023-07-02
Attending: PEDIATRICS
Payer: MEDICAID

## 2023-07-02 ENCOUNTER — APPOINTMENT (OUTPATIENT)
Dept: GENERAL RADIOLOGY | Age: 33
End: 2023-07-02
Payer: MEDICAID

## 2023-07-02 VITALS
OXYGEN SATURATION: 99 % | HEART RATE: 77 BPM | DIASTOLIC BLOOD PRESSURE: 77 MMHG | RESPIRATION RATE: 18 BRPM | BODY MASS INDEX: 39.08 KG/M2 | HEIGHT: 67 IN | WEIGHT: 249 LBS | TEMPERATURE: 98.1 F | SYSTOLIC BLOOD PRESSURE: 118 MMHG

## 2023-07-02 DIAGNOSIS — B34.8 RHINOVIRUS INFECTION: Primary | ICD-10-CM

## 2023-07-02 LAB
ALBUMIN SERPL-MCNC: 4.6 G/DL (ref 3.5–5.2)
ALP SERPL-CCNC: 83 U/L (ref 35–104)
ALT SERPL-CCNC: 16 U/L (ref 5–33)
ANION GAP SERPL CALCULATED.3IONS-SCNC: 12 MMOL/L (ref 7–19)
AST SERPL-CCNC: 14 U/L (ref 5–32)
B PARAP IS1001 DNA NPH QL NAA+NON-PROBE: NOT DETECTED
B PERT.PT PRMT NPH QL NAA+NON-PROBE: NOT DETECTED
BASOPHILS # BLD: 0.1 K/UL (ref 0–0.2)
BASOPHILS NFR BLD: 0.8 % (ref 0–1)
BILIRUB SERPL-MCNC: 0.3 MG/DL (ref 0.2–1.2)
BNP BLD-MCNC: <36 PG/ML (ref 0–124)
BUN SERPL-MCNC: 8 MG/DL (ref 6–20)
C PNEUM DNA NPH QL NAA+NON-PROBE: NOT DETECTED
CALCIUM SERPL-MCNC: 9.2 MG/DL (ref 8.6–10)
CHLORIDE SERPL-SCNC: 104 MMOL/L (ref 98–111)
CO2 SERPL-SCNC: 23 MMOL/L (ref 22–29)
CREAT SERPL-MCNC: 0.8 MG/DL (ref 0.5–0.9)
D DIMER PPP FEU-MCNC: 0.59 UG/ML FEU (ref 0–0.48)
EOSINOPHIL # BLD: 0.6 K/UL (ref 0–0.6)
EOSINOPHIL NFR BLD: 6.5 % (ref 0–5)
ERYTHROCYTE [DISTWIDTH] IN BLOOD BY AUTOMATED COUNT: 14.7 % (ref 11.5–14.5)
FLUAV RNA NPH QL NAA+NON-PROBE: NOT DETECTED
FLUBV RNA NPH QL NAA+NON-PROBE: NOT DETECTED
GLUCOSE SERPL-MCNC: 94 MG/DL (ref 74–109)
HADV DNA NPH QL NAA+NON-PROBE: NOT DETECTED
HCG SERPL QL: NEGATIVE
HCOV 229E RNA NPH QL NAA+NON-PROBE: NOT DETECTED
HCOV HKU1 RNA NPH QL NAA+NON-PROBE: NOT DETECTED
HCOV NL63 RNA NPH QL NAA+NON-PROBE: NOT DETECTED
HCOV OC43 RNA NPH QL NAA+NON-PROBE: NOT DETECTED
HCT VFR BLD AUTO: 40 % (ref 37–47)
HGB BLD-MCNC: 13 G/DL (ref 12–16)
HMPV RNA NPH QL NAA+NON-PROBE: NOT DETECTED
HPIV1 RNA NPH QL NAA+NON-PROBE: NOT DETECTED
HPIV2 RNA NPH QL NAA+NON-PROBE: NOT DETECTED
HPIV3 RNA NPH QL NAA+NON-PROBE: NOT DETECTED
HPIV4 RNA NPH QL NAA+NON-PROBE: NOT DETECTED
IMM GRANULOCYTES # BLD: 0 K/UL
LACTATE BLDV-SCNC: 0.9 MMOL/L (ref 0.5–1.9)
LYMPHOCYTES # BLD: 2.7 K/UL (ref 1.1–4.5)
LYMPHOCYTES NFR BLD: 31.6 % (ref 20–40)
M PNEUMO DNA NPH QL NAA+NON-PROBE: NOT DETECTED
MCH RBC QN AUTO: 28.7 PG (ref 27–31)
MCHC RBC AUTO-ENTMCNC: 32.5 G/DL (ref 33–37)
MCV RBC AUTO: 88.3 FL (ref 81–99)
MONOCYTES # BLD: 0.7 K/UL (ref 0–0.9)
MONOCYTES NFR BLD: 7.9 % (ref 0–10)
NEUTROPHILS # BLD: 4.5 K/UL (ref 1.5–7.5)
NEUTS SEG NFR BLD: 52.8 % (ref 50–65)
PLATELET # BLD AUTO: 250 K/UL (ref 130–400)
PMV BLD AUTO: 10.9 FL (ref 9.4–12.3)
POTASSIUM SERPL-SCNC: 3.7 MMOL/L (ref 3.5–5)
PROCALCITONIN: 0.06 NG/ML (ref 0–0.09)
PROT SERPL-MCNC: 8.2 G/DL (ref 6.6–8.7)
RBC # BLD AUTO: 4.53 M/UL (ref 4.2–5.4)
RSV RNA NPH QL NAA+NON-PROBE: NOT DETECTED
RV+EV RNA NPH QL NAA+NON-PROBE: DETECTED
SARS-COV-2 RNA NPH QL NAA+NON-PROBE: NOT DETECTED
SODIUM SERPL-SCNC: 139 MMOL/L (ref 136–145)
TROPONIN T SERPL-MCNC: <0.01 NG/ML (ref 0–0.03)
WBC # BLD AUTO: 8.5 K/UL (ref 4.8–10.8)

## 2023-07-02 PROCEDURE — 0202U NFCT DS 22 TRGT SARS-COV-2: CPT

## 2023-07-02 PROCEDURE — 93005 ELECTROCARDIOGRAM TRACING: CPT | Performed by: PEDIATRICS

## 2023-07-02 PROCEDURE — 84145 PROCALCITONIN (PCT): CPT

## 2023-07-02 PROCEDURE — 36415 COLL VENOUS BLD VENIPUNCTURE: CPT

## 2023-07-02 PROCEDURE — 99285 EMERGENCY DEPT VISIT HI MDM: CPT

## 2023-07-02 PROCEDURE — 85379 FIBRIN DEGRADATION QUANT: CPT

## 2023-07-02 PROCEDURE — 84484 ASSAY OF TROPONIN QUANT: CPT

## 2023-07-02 PROCEDURE — 71045 X-RAY EXAM CHEST 1 VIEW: CPT

## 2023-07-02 PROCEDURE — 80053 COMPREHEN METABOLIC PANEL: CPT

## 2023-07-02 PROCEDURE — 84703 CHORIONIC GONADOTROPIN ASSAY: CPT

## 2023-07-02 PROCEDURE — 83605 ASSAY OF LACTIC ACID: CPT

## 2023-07-02 PROCEDURE — 87040 BLOOD CULTURE FOR BACTERIA: CPT

## 2023-07-02 PROCEDURE — 94640 AIRWAY INHALATION TREATMENT: CPT

## 2023-07-02 PROCEDURE — 6370000000 HC RX 637 (ALT 250 FOR IP)

## 2023-07-02 PROCEDURE — 83880 ASSAY OF NATRIURETIC PEPTIDE: CPT

## 2023-07-02 PROCEDURE — 85025 COMPLETE CBC W/AUTO DIFF WBC: CPT

## 2023-07-02 RX ORDER — IPRATROPIUM BROMIDE AND ALBUTEROL SULFATE 2.5; .5 MG/3ML; MG/3ML
1 SOLUTION RESPIRATORY (INHALATION) ONCE
Status: COMPLETED | OUTPATIENT
Start: 2023-07-02 | End: 2023-07-02

## 2023-07-02 RX ORDER — IPRATROPIUM BROMIDE AND ALBUTEROL SULFATE 2.5; .5 MG/3ML; MG/3ML
SOLUTION RESPIRATORY (INHALATION)
Status: DISCONTINUED
Start: 2023-07-02 | End: 2023-07-02

## 2023-07-02 RX ORDER — IPRATROPIUM BROMIDE AND ALBUTEROL SULFATE 2.5; .5 MG/3ML; MG/3ML
1 SOLUTION RESPIRATORY (INHALATION)
Status: DISCONTINUED | OUTPATIENT
Start: 2023-07-02 | End: 2023-07-02

## 2023-07-02 RX ORDER — ALBUTEROL SULFATE 90 UG/1
2 AEROSOL, METERED RESPIRATORY (INHALATION) 4 TIMES DAILY PRN
Qty: 54 G | Refills: 1 | Status: SHIPPED | OUTPATIENT
Start: 2023-07-02

## 2023-07-02 RX ADMIN — IPRATROPIUM BROMIDE AND ALBUTEROL SULFATE 1 DOSE: 2.5; .5 SOLUTION RESPIRATORY (INHALATION) at 04:43

## 2023-07-02 ASSESSMENT — PAIN - FUNCTIONAL ASSESSMENT
PAIN_FUNCTIONAL_ASSESSMENT: 0-10
PAIN_FUNCTIONAL_ASSESSMENT: NONE - DENIES PAIN
PAIN_FUNCTIONAL_ASSESSMENT: NONE - DENIES PAIN

## 2023-07-02 ASSESSMENT — PAIN SCALES - GENERAL: PAINLEVEL_OUTOF10: 0

## 2023-07-03 LAB
EKG P AXIS: 41 DEGREES
EKG P-R INTERVAL: 194 MS
EKG Q-T INTERVAL: 408 MS
EKG QRS DURATION: 82 MS
EKG QTC CALCULATION (BAZETT): 422 MS
EKG T AXIS: 35 DEGREES

## 2023-07-03 PROCEDURE — 93010 ELECTROCARDIOGRAM REPORT: CPT | Performed by: INTERNAL MEDICINE

## 2023-07-05 ASSESSMENT — ENCOUNTER SYMPTOMS
COUGH: 0
VOMITING: 0
RHINORRHEA: 1
ABDOMINAL PAIN: 0
EYE DISCHARGE: 0
SHORTNESS OF BREATH: 0
NAUSEA: 0
COLOR CHANGE: 0
WHEEZING: 1
BACK PAIN: 0

## 2023-07-06 NOTE — ED PROVIDER NOTES
[address] [facility] EMERGENCY DEPT  eMERGENCY dEPARTMENT eNCOUnter      Pt Name: Brian Meyer  MRN: [id number]  [address] [facility] 1990  Date of evaluation: 7/2/2023  Provider: Katherine De Jesus MD    [address]       Chief Complaint   Patient presents with    Nasal Congestion     Chest congestion x 3 wks, feels hard to take a deep breath,          HISTORY OF PRESENT ILLNESS   (Location/Symptom, Timing/Onset,Context/Setting, Quality, Duration, Modifying Factors, Severity)  Note limiting factors. Brian Meyer is a 29 y.o. female who presents to the emergency department with congestion. Patient states she has had congestion for approximately weeks. \"It feels hard to take a deep breath. \"  Patient has a history of wheezing in the past.  Patient has no known exposures to [facility]. Patient does not have difficulty breathing, chest pain, lower extremity swelling or pain, fever, or palpitations. HPI    NursingNotes were reviewed. REVIEW OF SYSTEMS    (2-9 systems for level 4, 10 or more for level 5)     Review of Systems   Constitutional:  Negative for chills and fever. HENT:  Positive for congestion and rhinorrhea. Eyes:  Negative for discharge. Respiratory:  Positive for wheezing. Negative for cough and shortness of breath. Cardiovascular:  Negative for chest pain and palpitations. Gastrointestinal:  Negative for abdominal pain, nausea and vomiting. Genitourinary:  Negative for difficulty urinating and dysuria. Musculoskeletal:  Negative for back pain and neck pain. Skin:  Negative for color change and pallor. Neurological:  Negative for syncope and light-headedness. Psychiatric/Behavioral:  Negative for agitation and confusion. All other systems reviewed and are negative.          PAST MEDICALHISTORY     Past Medical History:   Diagnosis Date    Anxiety     Depression, acute 6/6/2019    Hypertension     Tobacco dependence 5/3/2022         SURGICAL HISTORY       Past Surgical History:   Procedure

## 2023-07-07 LAB
BACTERIA BLD CULT ORG #2: NORMAL
BACTERIA BLD CULT: NORMAL

## 2023-07-28 DIAGNOSIS — F32.A ANXIETY AND DEPRESSION: ICD-10-CM

## 2023-07-28 DIAGNOSIS — F41.9 ANXIETY AND DEPRESSION: ICD-10-CM

## 2023-07-28 RX ORDER — FLUOXETINE HYDROCHLORIDE 20 MG/1
20 CAPSULE ORAL DAILY
Qty: 30 CAPSULE | Refills: 0 | Status: SHIPPED | OUTPATIENT
Start: 2023-07-28

## 2023-07-28 NOTE — TELEPHONE ENCOUNTER
Ministerio Pacheco called to request a refill on her medication. Appointment has been made to establish care.     Last office visit : Visit date not found   Next office visit : 8/28/2023     Requested Prescriptions     Pending Prescriptions Disp Refills    FLUoxetine (PROZAC) 20 MG capsule 30 capsule 1     Sig: Take 1 capsule by mouth daily            Kenzie Abreu LPN

## 2023-08-27 SDOH — HEALTH STABILITY: PHYSICAL HEALTH: ON AVERAGE, HOW MANY MINUTES DO YOU ENGAGE IN EXERCISE AT THIS LEVEL?: 20 MIN

## 2023-08-27 SDOH — HEALTH STABILITY: PHYSICAL HEALTH: ON AVERAGE, HOW MANY DAYS PER WEEK DO YOU ENGAGE IN MODERATE TO STRENUOUS EXERCISE (LIKE A BRISK WALK)?: 4 DAYS

## 2023-08-28 ENCOUNTER — OFFICE VISIT (OUTPATIENT)
Dept: PRIMARY CARE CLINIC | Age: 33
End: 2023-08-28
Payer: MEDICAID

## 2023-08-28 VITALS
TEMPERATURE: 98.1 F | HEART RATE: 64 BPM | DIASTOLIC BLOOD PRESSURE: 84 MMHG | WEIGHT: 224 LBS | BODY MASS INDEX: 35.08 KG/M2 | OXYGEN SATURATION: 99 % | SYSTOLIC BLOOD PRESSURE: 122 MMHG

## 2023-08-28 DIAGNOSIS — Z76.89 ENCOUNTER TO ESTABLISH CARE: Primary | ICD-10-CM

## 2023-08-28 DIAGNOSIS — Z13.220 SCREENING FOR LIPID DISORDERS: ICD-10-CM

## 2023-08-28 DIAGNOSIS — F32.A ANXIETY AND DEPRESSION: ICD-10-CM

## 2023-08-28 DIAGNOSIS — F17.200 TOBACCO DEPENDENCE: ICD-10-CM

## 2023-08-28 DIAGNOSIS — Z86.2 HISTORY OF ANEMIA: ICD-10-CM

## 2023-08-28 DIAGNOSIS — E66.01 CLASS 2 SEVERE OBESITY DUE TO EXCESS CALORIES WITH SERIOUS COMORBIDITY AND BODY MASS INDEX (BMI) OF 35.0 TO 35.9 IN ADULT (HCC): ICD-10-CM

## 2023-08-28 DIAGNOSIS — F41.9 ANXIETY AND DEPRESSION: ICD-10-CM

## 2023-08-28 PROBLEM — E66.812 CLASS 2 SEVERE OBESITY DUE TO EXCESS CALORIES WITH SERIOUS COMORBIDITY AND BODY MASS INDEX (BMI) OF 35.0 TO 35.9 IN ADULT: Status: ACTIVE | Noted: 2023-08-28

## 2023-08-28 LAB
ALBUMIN SERPL-MCNC: 4.3 G/DL (ref 3.5–5.2)
ALP SERPL-CCNC: 94 U/L (ref 35–104)
ALT SERPL-CCNC: 13 U/L (ref 5–33)
ANION GAP SERPL CALCULATED.3IONS-SCNC: 12 MMOL/L (ref 7–19)
AST SERPL-CCNC: 13 U/L (ref 5–32)
BILIRUB SERPL-MCNC: <0.2 MG/DL (ref 0.2–1.2)
BUN SERPL-MCNC: 6 MG/DL (ref 6–20)
CALCIUM SERPL-MCNC: 9.3 MG/DL (ref 8.6–10)
CHLORIDE SERPL-SCNC: 104 MMOL/L (ref 98–111)
CHOLEST SERPL-MCNC: 173 MG/DL (ref 160–199)
CO2 SERPL-SCNC: 23 MMOL/L (ref 22–29)
CREAT SERPL-MCNC: 0.7 MG/DL (ref 0.5–0.9)
GLUCOSE SERPL-MCNC: 93 MG/DL (ref 74–109)
HDLC SERPL-MCNC: 45 MG/DL (ref 65–121)
IRON SATN MFR SERPL: 4 % (ref 14–50)
IRON SERPL-MCNC: 15 UG/DL (ref 37–145)
LDLC SERPL CALC-MCNC: 101 MG/DL
POTASSIUM SERPL-SCNC: 4 MMOL/L (ref 3.5–5)
PROT SERPL-MCNC: 7.9 G/DL (ref 6.6–8.7)
SODIUM SERPL-SCNC: 139 MMOL/L (ref 136–145)
T4 FREE SERPL-MCNC: 1.21 NG/DL (ref 0.93–1.7)
TIBC SERPL-MCNC: 391 UG/DL (ref 250–400)
TRIGL SERPL-MCNC: 133 MG/DL (ref 0–149)
TSH SERPL DL<=0.005 MIU/L-ACNC: 1.39 UIU/ML (ref 0.27–4.2)

## 2023-08-28 PROCEDURE — 99214 OFFICE O/P EST MOD 30 MIN: CPT | Performed by: NURSE PRACTITIONER

## 2023-08-28 RX ORDER — FLUOXETINE HYDROCHLORIDE 40 MG/1
40 CAPSULE ORAL DAILY
Qty: 30 CAPSULE | Refills: 0 | Status: SHIPPED | OUTPATIENT
Start: 2023-08-28

## 2023-08-28 SDOH — ECONOMIC STABILITY: HOUSING INSECURITY
IN THE LAST 12 MONTHS, WAS THERE A TIME WHEN YOU DID NOT HAVE A STEADY PLACE TO SLEEP OR SLEPT IN A SHELTER (INCLUDING NOW)?: NO

## 2023-08-28 SDOH — ECONOMIC STABILITY: FOOD INSECURITY: WITHIN THE PAST 12 MONTHS, YOU WORRIED THAT YOUR FOOD WOULD RUN OUT BEFORE YOU GOT MONEY TO BUY MORE.: NEVER TRUE

## 2023-08-28 SDOH — ECONOMIC STABILITY: FOOD INSECURITY: WITHIN THE PAST 12 MONTHS, THE FOOD YOU BOUGHT JUST DIDN'T LAST AND YOU DIDN'T HAVE MONEY TO GET MORE.: NEVER TRUE

## 2023-08-28 SDOH — ECONOMIC STABILITY: INCOME INSECURITY: HOW HARD IS IT FOR YOU TO PAY FOR THE VERY BASICS LIKE FOOD, HOUSING, MEDICAL CARE, AND HEATING?: NOT VERY HARD

## 2023-08-28 ASSESSMENT — ENCOUNTER SYMPTOMS
GASTROINTESTINAL NEGATIVE: 1
EYES NEGATIVE: 1
RESPIRATORY NEGATIVE: 1
ALLERGIC/IMMUNOLOGIC NEGATIVE: 1

## 2023-08-28 ASSESSMENT — PATIENT HEALTH QUESTIONNAIRE - PHQ9
2. FEELING DOWN, DEPRESSED OR HOPELESS: 1
7. TROUBLE CONCENTRATING ON THINGS, SUCH AS READING THE NEWSPAPER OR WATCHING TELEVISION: 3
3. TROUBLE FALLING OR STAYING ASLEEP: 3
SUM OF ALL RESPONSES TO PHQ QUESTIONS 1-9: 12
9. THOUGHTS THAT YOU WOULD BE BETTER OFF DEAD, OR OF HURTING YOURSELF: 0
SUM OF ALL RESPONSES TO PHQ QUESTIONS 1-9: 12
5. POOR APPETITE OR OVEREATING: 3
SUM OF ALL RESPONSES TO PHQ QUESTIONS 1-9: 12
SUM OF ALL RESPONSES TO PHQ QUESTIONS 1-9: 12
10. IF YOU CHECKED OFF ANY PROBLEMS, HOW DIFFICULT HAVE THESE PROBLEMS MADE IT FOR YOU TO DO YOUR WORK, TAKE CARE OF THINGS AT HOME, OR GET ALONG WITH OTHER PEOPLE: 1
8. MOVING OR SPEAKING SO SLOWLY THAT OTHER PEOPLE COULD HAVE NOTICED. OR THE OPPOSITE, BEING SO FIGETY OR RESTLESS THAT YOU HAVE BEEN MOVING AROUND A LOT MORE THAN USUAL: 0
6. FEELING BAD ABOUT YOURSELF - OR THAT YOU ARE A FAILURE OR HAVE LET YOURSELF OR YOUR FAMILY DOWN: 1
4. FEELING TIRED OR HAVING LITTLE ENERGY: 1

## 2023-08-28 NOTE — PROGRESS NOTES
index (BMI) of 35.0 to 35.9 in adult [facility])      Return in about 2 weeks (around 9/11/2023) for keep follow up with PCP. Increase Prozac from 20mg to 40mg  Labs today and will call with results  Genesight testing today  Follow up in 1-2 weeks to discuss results. PDMP Monitoring:    Last PDMP Sophia Young as Reviewed:  Review User Review Instant Review Result   Shelby Chamber 10/9/2022  8:34 AM Reviewed PDMP [1]       Urine Drug Screenings (1 yr)       Urine Drug Screen  Collected: 6/6/2019  4:20 AM (Final result)              Urine Drug Screen  Collected: 9/30/2018  5:00 PM (Final result)                  Medication Contract and Consent for Opioid Use Documents Filed        No documents found                     Patient given educational materials -see patient instructions. Discussed use, benefit, and side effects of prescribed medications. All patient questions answered. Pt voiced understanding. Reviewed health maintenance. Instructed to continue currentmedications, diet and exercise. Patient agreed with treatment plan. Follow up as directed. MEDICATIONS:  Orders Placed This Encounter   Medications    FLUoxetine (PROZAC) 40 MG capsule     Sig: Take 1 capsule by mouth daily     Dispense:  30 capsule     Refill:  0         ORDERS:  Orders Placed This Encounter   Procedures    Iron and TIBC    TSH    T4, Free    Lipid, Fasting    Comprehensive Metabolic Panel       Follow-up:  Return in about 2 weeks (around 9/11/2023) for keep follow up with PCP. PATIENT INSTRUCTIONS:  There are no Patient Instructions on file for this visit. Electronically signed by GISELLE Cabrales on 8/28/2023 at 11:51 AM    EMR Dragon/transcription disclaimer:  Much of thisencounter note is electronic transcription/translation of spoken language to printed texts. The electronic translation of spoken language may be erroneous, or at times, nonsensical words or phrases may be inadvertentlytranscribed.   Although I have reviewed

## 2023-08-29 DIAGNOSIS — D50.9 IRON DEFICIENCY ANEMIA, UNSPECIFIED IRON DEFICIENCY ANEMIA TYPE: Primary | ICD-10-CM

## 2023-08-29 RX ORDER — FERROUS SULFATE 325(65) MG
TABLET ORAL
Qty: 12 TABLET | Refills: 2 | Status: SHIPPED | OUTPATIENT
Start: 2023-08-29

## 2023-09-18 ENCOUNTER — OFFICE VISIT (OUTPATIENT)
Dept: PRIMARY CARE CLINIC | Age: 33
End: 2023-09-18
Payer: MEDICAID

## 2023-09-18 VITALS
TEMPERATURE: 97.5 F | SYSTOLIC BLOOD PRESSURE: 126 MMHG | HEIGHT: 67 IN | DIASTOLIC BLOOD PRESSURE: 84 MMHG | HEART RATE: 62 BPM | OXYGEN SATURATION: 99 % | BODY MASS INDEX: 36.07 KG/M2 | WEIGHT: 229.8 LBS

## 2023-09-18 DIAGNOSIS — F32.A ANXIETY AND DEPRESSION: Primary | ICD-10-CM

## 2023-09-18 DIAGNOSIS — D50.9 IRON DEFICIENCY ANEMIA, UNSPECIFIED IRON DEFICIENCY ANEMIA TYPE: ICD-10-CM

## 2023-09-18 DIAGNOSIS — F41.9 ANXIETY AND DEPRESSION: Primary | ICD-10-CM

## 2023-09-18 DIAGNOSIS — L30.9 ECZEMA, UNSPECIFIED TYPE: ICD-10-CM

## 2023-09-18 DIAGNOSIS — F17.200 TOBACCO DEPENDENCE: ICD-10-CM

## 2023-09-18 PROCEDURE — 99214 OFFICE O/P EST MOD 30 MIN: CPT | Performed by: NURSE PRACTITIONER

## 2023-09-18 RX ORDER — FLUOXETINE HYDROCHLORIDE 40 MG/1
40 CAPSULE ORAL DAILY
Qty: 90 CAPSULE | Refills: 1 | Status: SHIPPED | OUTPATIENT
Start: 2023-09-18

## 2023-09-18 RX ORDER — CLOBETASOL PROPIONATE 0.5 MG/G
OINTMENT TOPICAL
Qty: 60 G | Refills: 3 | Status: SHIPPED | OUTPATIENT
Start: 2023-09-18

## 2023-09-18 ASSESSMENT — ENCOUNTER SYMPTOMS
RESPIRATORY NEGATIVE: 1
GASTROINTESTINAL NEGATIVE: 1
EYES NEGATIVE: 1
ALLERGIC/IMMUNOLOGIC NEGATIVE: 1

## 2023-12-11 ENCOUNTER — OFFICE VISIT (OUTPATIENT)
Dept: PRIMARY CARE CLINIC | Age: 33
End: 2023-12-11
Payer: MEDICAID

## 2023-12-11 VITALS
OXYGEN SATURATION: 99 % | DIASTOLIC BLOOD PRESSURE: 82 MMHG | HEART RATE: 77 BPM | BODY MASS INDEX: 36.34 KG/M2 | TEMPERATURE: 98.1 F | SYSTOLIC BLOOD PRESSURE: 100 MMHG | WEIGHT: 232 LBS

## 2023-12-11 DIAGNOSIS — E66.9 CLASS 2 OBESITY WITHOUT SERIOUS COMORBIDITY WITH BODY MASS INDEX (BMI) OF 36.0 TO 36.9 IN ADULT, UNSPECIFIED OBESITY TYPE: ICD-10-CM

## 2023-12-11 DIAGNOSIS — F17.200 TOBACCO DEPENDENCE: ICD-10-CM

## 2023-12-11 DIAGNOSIS — F41.9 ANXIETY AND DEPRESSION: Primary | ICD-10-CM

## 2023-12-11 DIAGNOSIS — F32.A ANXIETY AND DEPRESSION: Primary | ICD-10-CM

## 2023-12-11 DIAGNOSIS — D50.9 IRON DEFICIENCY ANEMIA, UNSPECIFIED IRON DEFICIENCY ANEMIA TYPE: ICD-10-CM

## 2023-12-11 DIAGNOSIS — L98.9 SKIN LESION OF RIGHT UPPER EXTREMITY: ICD-10-CM

## 2023-12-11 LAB
BASOPHILS # BLD: 0.1 K/UL (ref 0–0.2)
EOSINOPHIL # BLD: 0.3 K/UL (ref 0–0.6)
EOSINOPHIL NFR BLD: 3 % (ref 0–5)
ERYTHROCYTE [DISTWIDTH] IN BLOOD BY AUTOMATED COUNT: 15.3 % (ref 11.5–14.5)
HCT VFR BLD AUTO: 37.5 % (ref 37–47)
HGB BLD-MCNC: 12.2 G/DL (ref 12–16)
IMM GRANULOCYTES # BLD: 0 K/UL
IRON SATN MFR SERPL: 14 % (ref 14–50)
IRON SERPL-MCNC: 46 UG/DL (ref 37–145)
LYMPHOCYTES # BLD: 2.5 K/UL (ref 1.1–4.5)
LYMPHOCYTES NFR BLD: 29.2 % (ref 20–40)
MCH RBC QN AUTO: 30.7 PG (ref 27–31)
MCHC RBC AUTO-ENTMCNC: 32.5 G/DL (ref 33–37)
MCV RBC AUTO: 94.5 FL (ref 81–99)
MONOCYTES # BLD: 0.6 K/UL (ref 0–0.9)
MONOCYTES NFR BLD: 7.1 % (ref 0–10)
NEUTROPHILS # BLD: 5.1 K/UL (ref 1.5–7.5)
NEUTS SEG NFR BLD: 59.7 % (ref 50–65)
PLATELET # BLD AUTO: 201 K/UL (ref 130–400)
PMV BLD AUTO: 11.9 FL (ref 9.4–12.3)
RBC # BLD AUTO: 3.97 M/UL (ref 4.2–5.4)
TIBC SERPL-MCNC: 328 UG/DL (ref 250–400)
WBC # BLD AUTO: 8.6 K/UL (ref 4.8–10.8)

## 2023-12-11 PROCEDURE — 99214 OFFICE O/P EST MOD 30 MIN: CPT | Performed by: NURSE PRACTITIONER

## 2023-12-11 RX ORDER — SEMAGLUTIDE 0.25 MG/.5ML
0.25 INJECTION, SOLUTION SUBCUTANEOUS
Qty: 0.5 ML | Refills: 0 | Status: SHIPPED | OUTPATIENT
Start: 2023-12-11

## 2023-12-11 ASSESSMENT — ENCOUNTER SYMPTOMS
GASTROINTESTINAL NEGATIVE: 1
ALLERGIC/IMMUNOLOGIC NEGATIVE: 1
EYES NEGATIVE: 1
RESPIRATORY NEGATIVE: 1

## 2023-12-11 NOTE — PROGRESS NOTES
[address] [facility] PRIMARY CARE  [facility],[address]  [place] [address]  Dept: [phone]  Dept Fax: [phone]  Loc: [phone]    Shahram Zhang is a 34 y.o. female who presents today for her medical conditions/complaints as noted below. Shahram Zhang is c/o of 3 Month Follow-Up        HPI:   She presents for follow up anxiety and depression. She was started on Prozac 40 mg daily and seems to be tolerating that dose well. She has iron deficiency anemia and currently is not taking iron medicine. She states she took the three month supply and once it was out she did not refill it. She has concerns about her weight. She states the lowest she has got down was 222. States she has changed diet, eating less fried foods, eating three small meals daily. She has walking more. She has been taking a OTC Gummy weight management pills with not much results. She states wants to try medicine before considering surgery. She has concerns about a dark discolored lesion on her right upper arm that has been there for about seven years. She states the lesion is hard. Denies any bleeding or drainage. She states the lesion started almost like a pimple but has increased to the size over the past seven years. She denies any known trauma to the area.     HPI   Chief Complaint   Patient presents with    3 Month Follow-Up     Past Medical History:   Diagnosis Date    Anxiety     Class 2 severe obesity due to excess calories with serious comorbidity and body mass index (BMI) of 35.0 to 35.9 in adult [facility]) 2023    Depression, acute 2019    Hypertension     Tobacco dependence 5/3/2022      Past Surgical History:   Procedure Laterality Date    APPENDECTOMY       SECTION      CHOLECYSTECTOMY, LAPAROSCOPIC N/A 2022    ROBOTIC CHOLECYSTECTOMY WITH ICG performed by Elver Chapa MD at [address]             2023     8:30 AM 2023     8:12 AM

## 2023-12-18 ENCOUNTER — TELEPHONE (OUTPATIENT)
Dept: PRIMARY CARE CLINIC | Age: 33
End: 2023-12-18

## 2023-12-18 DIAGNOSIS — E66.9 CLASS 2 OBESITY WITHOUT SERIOUS COMORBIDITY WITH BODY MASS INDEX (BMI) OF 36.0 TO 36.9 IN ADULT, UNSPECIFIED OBESITY TYPE: Primary | ICD-10-CM

## 2023-12-18 NOTE — TELEPHONE ENCOUNTER
Patient left  and stated that she can't get Wegovy for weight loss because its on back order and wanted to know if there is something else that she can get for weight loss. Please advise?

## 2023-12-20 NOTE — TELEPHONE ENCOUNTER
Patient notified. [facility] doesn't have it in stock so she's going to call [facility] to see if they have it and will let us know.

## 2024-01-05 ENCOUNTER — TELEPHONE (OUTPATIENT)
Dept: PRIMARY CARE CLINIC | Age: 34
End: 2024-01-05

## 2024-01-05 DIAGNOSIS — E66.9 CLASS 2 OBESITY WITHOUT SERIOUS COMORBIDITY WITH BODY MASS INDEX (BMI) OF 36.0 TO 36.9 IN ADULT, UNSPECIFIED OBESITY TYPE: ICD-10-CM

## 2024-01-05 NOTE — TELEPHONE ENCOUNTER
Please call the patient and let her know that she needs to schedule a follow up appt on weight loss medicine. GISELLE Clark

## 2024-01-05 NOTE — TELEPHONE ENCOUNTER
Jessica Naima called to request a refill on her medication.      Last office visit : 12/11/2023   Next office visit : Visit date not found     Requested Prescriptions     Pending Prescriptions Disp Refills    liraglutide-weight management 18 MG/3ML SOPN 3 mL 0     Sig: Inject 0.6 mg into the skin daily            Lilian Arzola MA

## 2024-01-05 NOTE — TELEPHONE ENCOUNTER
----- Message from Clari Strickland sent at 1/5/2024 10:28 AM CST -----  Subject: Refill Request    QUESTIONS  Name of Medication? liraglutide-weight management 18 MG/3ML SOPN  Patient-reported dosage and instructions? daily   How many days do you have left? 0  Preferred Pharmacy? [id number]  Pharmacy phone number (if available)? [phone]  Additional Information for Provider? Patient is having trouble with   pharmacy getting this medication. She is changing pharmacy.   ---------------------------------------------------------------------------  --------------  CALL BACK INFO  What is the best way for the office to contact you? OK to leave message on   voicemail  Preferred Call Back Phone Number? [phone]  ---------------------------------------------------------------------------  --------------  SCRIPT ANSWERS  Relationship to Patient? Self

## 2024-01-10 ENCOUNTER — PATIENT MESSAGE (OUTPATIENT)
Dept: PRIMARY CARE CLINIC | Age: 34
End: 2024-01-10

## 2024-01-11 RX ORDER — ORAL SEMAGLUTIDE 3 MG/1
1 TABLET ORAL DAILY
Qty: 30 TABLET | Refills: 0 | Status: SHIPPED | OUTPATIENT
Start: 2024-01-11

## 2024-01-11 NOTE — TELEPHONE ENCOUNTER
Pt is good to try anything. I know ozempic has been on back order for awhile, so I pended the rybelsus 3 mg to you. If this is ok, please sign pended medication.

## 2024-01-11 NOTE — TELEPHONE ENCOUNTER
We could try daily pill Rybelsus or see if insurance would cover weekly Ozempic injection. GISELLE Clark   no

## 2024-01-25 DIAGNOSIS — E66.9 CLASS 2 OBESITY WITHOUT SERIOUS COMORBIDITY WITH BODY MASS INDEX (BMI) OF 36.0 TO 36.9 IN ADULT, UNSPECIFIED OBESITY TYPE: Primary | ICD-10-CM

## 2024-01-25 RX ORDER — TIRZEPATIDE 2.5 MG/.5ML
2.5 INJECTION, SOLUTION SUBCUTANEOUS WEEKLY
Qty: 2 ML | Refills: 0 | Status: SHIPPED | OUTPATIENT
Start: 2024-01-25

## 2024-04-05 ENCOUNTER — HOSPITAL ENCOUNTER (EMERGENCY)
Age: 34
Discharge: HOME | End: 2024-04-05
Payer: MEDICAID

## 2024-04-05 VITALS
SYSTOLIC BLOOD PRESSURE: 155 MMHG | TEMPERATURE: 98.2 F | OXYGEN SATURATION: 100 % | HEART RATE: 58 BPM | DIASTOLIC BLOOD PRESSURE: 90 MMHG | RESPIRATION RATE: 16 BRPM

## 2024-04-05 DIAGNOSIS — Z20.2 EXPOSURE TO STD: Primary | ICD-10-CM

## 2024-04-05 LAB
BACTERIA URNS QL MICRO: ABNORMAL /HPF
BILIRUB UR QL STRIP: NEGATIVE
C TRACH DNA UR QL NAA+PROBE: NOT DETECTED
CLARITY UR: ABNORMAL
COLOR UR: ABNORMAL
CRYSTALS URNS MICRO: ABNORMAL /HPF
EPI CELLS #/AREA URNS AUTO: 6 /HPF (ref 0–5)
GLUCOSE UR STRIP.AUTO-MCNC: NEGATIVE MG/DL
HGB UR STRIP.AUTO-MCNC: ABNORMAL MG/L
HYALINE CASTS #/AREA URNS AUTO: 4 /HPF (ref 0–8)
KETONES UR STRIP.AUTO-MCNC: ABNORMAL MG/DL
LEUKOCYTE ESTERASE UR QL STRIP.AUTO: ABNORMAL
N GONORRHOEA DNA UR QL NAA+PROBE: NOT DETECTED
NITRITE UR QL STRIP.AUTO: NEGATIVE
PH UR STRIP.AUTO: 5.5 [PH] (ref 5–8)
PROT UR STRIP.AUTO-MCNC: NEGATIVE MG/DL
RBC #/AREA URNS AUTO: 71 /HPF (ref 0–4)
SP GR UR STRIP.AUTO: 1.02 (ref 1–1.03)
T VAGINALIS DNA UR QL NAA+PROBE: NOT DETECTED
UROBILINOGEN UR STRIP.AUTO-MCNC: 0.2 E.U./DL
WBC #/AREA URNS AUTO: 5 /HPF (ref 0–5)

## 2024-04-05 PROCEDURE — 87661 TRICHOMONAS VAGINALIS AMPLIF: CPT

## 2024-04-05 PROCEDURE — 81001 URINALYSIS AUTO W/SCOPE: CPT

## 2024-04-05 PROCEDURE — 99283 EMERGENCY DEPT VISIT LOW MDM: CPT

## 2024-04-05 PROCEDURE — 87491 CHLMYD TRACH DNA AMP PROBE: CPT

## 2024-04-05 PROCEDURE — 87591 N.GONORRHOEAE DNA AMP PROB: CPT

## 2024-04-05 RX ORDER — METRONIDAZOLE 500 MG/1
500 TABLET ORAL 2 TIMES DAILY
Qty: 14 TABLET | Refills: 0 | Status: SHIPPED | OUTPATIENT
Start: 2024-04-05 | End: 2024-04-12

## 2024-04-05 ASSESSMENT — ENCOUNTER SYMPTOMS
COUGH: 0
SHORTNESS OF BREATH: 0
BACK PAIN: 0

## 2024-04-05 ASSESSMENT — PAIN - FUNCTIONAL ASSESSMENT: PAIN_FUNCTIONAL_ASSESSMENT: NONE - DENIES PAIN

## 2024-04-05 NOTE — ED PROVIDER NOTES
[facility] EMERGENCY DEPT  EMERGENCY DEPARTMENT ENCOUNTER      Pt Name: Jessica Mcnamara  MRN: [id number]  Birthdate 1990  Date of evaluation: 2024  Provider: GISELLE Escobedo CNP  5:23 PM    CHIEF COMPLAINT       Chief Complaint   Patient presents with    Exposure to STD     Pt presents to ED with c/o exposure to STD.          HISTORY OF PRESENT ILLNESS    Jessica Mcnamara is a 33 y.o. female who presents to the emergency department with concern for exposure to STD. She states her significant other called her while she was at work and told her they tested positive for trichomonas. Today she feels normal. No vaginal discharge or bleeding. No dysuria or urinary symptoms.     HPI    Nursing Notes were reviewed.    REVIEW OF SYSTEMS       Review of Systems   Constitutional:  Negative for chills and fever.   HENT:  Negative for congestion.    Respiratory:  Negative for cough and shortness of breath.    Genitourinary:  Negative for dysuria, flank pain, frequency, hematuria, vaginal bleeding and vaginal discharge.   Musculoskeletal:  Negative for back pain.       Except as noted above the remainder of the review of systems was reviewed and negative.       PAST MEDICAL HISTORY     Past Medical History:   Diagnosis Date    Anxiety     Class 2 severe obesity due to excess calories with serious comorbidity and body mass index (BMI) of 35.0 to 35.9 in adult ([facility]) 2023    Depression, acute 2019    Hypertension     Tobacco dependence 5/3/2022         SURGICAL HISTORY       Past Surgical History:   Procedure Laterality Date    APPENDECTOMY       SECTION      CHOLECYSTECTOMY, LAPAROSCOPIC N/A 2022    ROBOTIC CHOLECYSTECTOMY WITH ICG performed by Marta Shay MD at [facility] OR    TUBAL LIGATION           CURRENT MEDICATIONS       Previous Medications    ALBUTEROL SULFATE HFA (VENTOLIN HFA) 108 (90 BASE) MCG/ACT INHALER    Inhale 2 puffs into the lungs 4 times daily as needed for Wheezing    CLOBETASOL     Humiliation, Afraid, Rape, and Kick questionnaire     Fear of Current or Ex-Partner: No     Emotionally Abused: No     Physically Abused: No     Sexually Abused: No   Housing Stability: Unknown (8/28/2023)    Housing Stability Vital Sign     Unstable Housing in the Last Year: No       SCREENINGS         [place] Coma Scale  Eye Opening: Spontaneous  Best Verbal Response: Oriented  Best Motor Response: Obeys commands  [place] Coma Scale Score: 15                     CIWA Assessment  BP: (!) 155/90  Pulse: 58                 PHYSICAL EXAM       ED Triage Vitals [04/05/24 1703]   BP Temp Temp Source Pulse Respirations SpO2 Height Weight   (!) 155/90 98.2 °F (36.8 °C) Oral 58 16 100 % -- --       Physical Exam  Vitals reviewed.   Constitutional:       General: She is not in acute distress.     Appearance: Normal appearance. She is not ill-appearing, toxic-appearing or diaphoretic.   Cardiovascular:      Rate and Rhythm: Normal rate and regular rhythm.      Pulses: Normal pulses.      Heart sounds: Normal heart sounds.   Pulmonary:      Effort: Pulmonary effort is normal.      Breath sounds: Normal breath sounds.   Abdominal:      General: Bowel sounds are normal. There is no distension.      Palpations: Abdomen is soft.      Tenderness: There is no abdominal tenderness. There is no right CVA tenderness, left CVA tenderness or guarding.   Skin:     General: Skin is warm and dry.      Capillary Refill: Capillary refill takes less than 2 seconds.   Neurological:      General: No focal deficit present.      Mental Status: She is alert and oriented to person, place, and time.         DIAGNOSTIC RESULTS     EKG: All EKG's are interpreted by the Emergency Department Physician who either signs or Co-signs this chart in the absence of a cardiologist.        RADIOLOGY:   Non-plain film images such as CT, Ultrasound and MRI are read by the radiologist. Plain radiographic images are visualized and preliminarily interpreted by the

## 2024-04-11 ENCOUNTER — TELEMEDICINE (OUTPATIENT)
Dept: PRIMARY CARE CLINIC | Age: 34
End: 2024-04-11

## 2024-04-11 DIAGNOSIS — F32.A ANXIETY AND DEPRESSION: Primary | ICD-10-CM

## 2024-04-11 DIAGNOSIS — F41.9 ANXIETY AND DEPRESSION: Primary | ICD-10-CM

## 2024-04-11 RX ORDER — DESVENLAFAXINE SUCCINATE 50 MG/1
50 TABLET, EXTENDED RELEASE ORAL DAILY
Qty: 30 TABLET | Refills: 1 | Status: SHIPPED | OUTPATIENT
Start: 2024-04-11

## 2024-04-11 ASSESSMENT — ENCOUNTER SYMPTOMS
ALLERGIC/IMMUNOLOGIC NEGATIVE: 1
EYES NEGATIVE: 1
RESPIRATORY NEGATIVE: 1
GASTROINTESTINAL NEGATIVE: 1

## 2024-04-11 NOTE — PROGRESS NOTES
(virtual) with the patient discussing the diagnosis and importance of compliance with the treatment plan as well as documenting on the day of the visit.    --Prema Sesay, APRN

## 2024-04-17 ENCOUNTER — OFFICE VISIT (OUTPATIENT)
Age: 34
End: 2024-04-17
Payer: MEDICAID

## 2024-04-17 VITALS
BODY MASS INDEX: 34.83 KG/M2 | HEART RATE: 82 BPM | RESPIRATION RATE: 22 BRPM | WEIGHT: 222.4 LBS | TEMPERATURE: 98.2 F | DIASTOLIC BLOOD PRESSURE: 84 MMHG | OXYGEN SATURATION: 99 % | SYSTOLIC BLOOD PRESSURE: 160 MMHG

## 2024-04-17 DIAGNOSIS — R03.0 ELEVATED BLOOD PRESSURE READING WITHOUT DIAGNOSIS OF HYPERTENSION: ICD-10-CM

## 2024-04-17 DIAGNOSIS — Z20.2 POSSIBLE EXPOSURE TO STD: ICD-10-CM

## 2024-04-17 DIAGNOSIS — R39.89 GENITAL SORE: Primary | ICD-10-CM

## 2024-04-17 PROCEDURE — 99213 OFFICE O/P EST LOW 20 MIN: CPT

## 2024-04-17 ASSESSMENT — ENCOUNTER SYMPTOMS
EYE ITCHING: 0
CONSTIPATION: 0
RHINORRHEA: 0
DIARRHEA: 0
EYE DISCHARGE: 0
COUGH: 0
SINUS PRESSURE: 0
SORE THROAT: 0
VOMITING: 0
SHORTNESS OF BREATH: 0
BLOOD IN STOOL: 0
COLOR CHANGE: 0
ABDOMINAL PAIN: 0
WHEEZING: 0
NAUSEA: 0

## 2024-04-17 NOTE — PROGRESS NOTES
[facility]  [facility] URGENT CARE  [address] [place] [id number]  Dept: [phone]  Dept Fax: [phone]  Loc: [phone]    Jessica Mcnamara is a 33 y.o. female who presents today for her medical conditions/complaints as noted below.  Jessica Mcnamara is complaining of Exposure to STD (Exposed to Herpes)        HPI:   Exposure to STD   The patient's primary symptoms include genital lesions. This is a new problem. The current episode started today. The problem has been gradually worsening. Pertinent negatives include no abdominal pain, fever, genital odor or sore throat. She has tried nothing for the symptoms. Risk factors include history of STDs.     Patient seen in ED on 24 for STD exposure, treated for trich. Patient is concerned she may have been exposed to HSV as well. Sores started on genitals this morning, they are very painful, itchy, and burn.    Past Medical History:   Diagnosis Date    Anxiety     Class 2 severe obesity due to excess calories with serious comorbidity and body mass index (BMI) of 35.0 to 35.9 in adult ([facility]) 2023    Depression, acute 2019    Hypertension     Tobacco dependence 5/3/2022       Past Surgical History:   Procedure Laterality Date    APPENDECTOMY       SECTION      CHOLECYSTECTOMY, LAPAROSCOPIC N/A 2022    ROBOTIC CHOLECYSTECTOMY WITH ICG performed by Marta Shay MD at [facility] OR    TUBAL LIGATION         Family History   Problem Relation Age of Onset    Depression Mother     Heart Attack Mother     High Blood Pressure Mother     Miscarriages / Stillbirths Mother     Other Maternal Grandmother         heart failure       Social History     Tobacco Use    Smoking status: Every Day     Current packs/day: 0.50     Average packs/day: 0.5 packs/day for 10.0 years (5.0 ttl pk-yrs)     Types: Cigarettes, E-Cigarettes    Smokeless tobacco: Never   Substance Use Topics    Alcohol use: Yes     Alcohol/week:

## 2024-04-17 NOTE — PATIENT INSTRUCTIONS
-Culture performed today, will call with results and appropriately treat  -With genital sores starting this morning, will also do HSV blood testing.  -Refrain from sexual intercourse until results are obtained and treatment is completed (if treatment is necessary)  -If testing is positive, you and your partner(s) should be treated  -The patient is to follow up with PCP or return to clinic if symptoms worsen/fail to improve.     -Blood pressure elevated today. She is very anxious and upset. Recommend monitoring at home over the next few days and following up with PCP if elevated. Can work on diet, exercise, weight loss, and reducing sodium in diet.       Urgent Care evaluation today is not a substitute for PCP visit. Follow up care is your responsibility to discuss and review this [facility] visit.

## 2024-04-20 ENCOUNTER — TELEPHONE (OUTPATIENT)
Dept: PRIMARY CARE CLINIC | Age: 34
End: 2024-04-20

## 2024-04-20 DIAGNOSIS — B00.9 HSV-1 INFECTION: Primary | ICD-10-CM

## 2024-04-20 DIAGNOSIS — B00.9 HSV-2 INFECTION: ICD-10-CM

## 2024-04-20 LAB
FINAL REPORT: NORMAL
HSV1 GG IGG SER-ACNC: 22.2 IV
HSV1+2 IGG SER IA-ACNC: >22.4 IV
HSV1+2 IGM SER IA-ACNC: 0.71 IV
HSV2 GG IGG SER-ACNC: 2.76 IV
PRELIMINARY: NORMAL

## 2024-04-20 RX ORDER — ACYCLOVIR 400 MG/1
400 TABLET ORAL 3 TIMES DAILY
Qty: 30 TABLET | Refills: 0 | Status: SHIPPED | OUTPATIENT
Start: 2024-04-20 | End: 2024-04-30

## 2024-04-20 NOTE — TELEPHONE ENCOUNTER
Blood work was positive for HSV 1 and 2, meaning oral and genital. Will send in antiviral, take entire therapy. Refrain from any sexual activity until completion of medication and lesions have resolved. Avoid any douching and perform proper perineal hygiene. Based off results, sex partner may need to be noticed and receive treatment as well. Follow up with PCP if symptoms do not improve.

## 2024-05-02 DIAGNOSIS — F32.A ANXIETY AND DEPRESSION: ICD-10-CM

## 2024-05-02 DIAGNOSIS — F41.9 ANXIETY AND DEPRESSION: ICD-10-CM

## 2024-05-03 RX ORDER — FLUOXETINE HYDROCHLORIDE 40 MG/1
40 CAPSULE ORAL DAILY
Qty: 90 CAPSULE | Refills: 1 | OUTPATIENT
Start: 2024-05-03

## 2024-05-07 DIAGNOSIS — F32.A ANXIETY AND DEPRESSION: ICD-10-CM

## 2024-05-07 DIAGNOSIS — L30.9 ECZEMA, UNSPECIFIED TYPE: ICD-10-CM

## 2024-05-07 DIAGNOSIS — F41.9 ANXIETY AND DEPRESSION: ICD-10-CM

## 2024-05-07 RX ORDER — TRIAMCINOLONE ACETONIDE 1 MG/G
CREAM TOPICAL
Qty: 453.6 G | Refills: 0 | Status: SHIPPED | OUTPATIENT
Start: 2024-05-07

## 2024-05-08 RX ORDER — DESVENLAFAXINE SUCCINATE 50 MG/1
50 TABLET, EXTENDED RELEASE ORAL DAILY
Qty: 30 TABLET | Refills: 1 | Status: SHIPPED | OUTPATIENT
Start: 2024-05-08

## 2024-05-08 NOTE — TELEPHONE ENCOUNTER
Jessica Naima called to request a refill on her medication.      Last office visit : 4/11/2024   Next office visit : 5/7/2024     Requested Prescriptions     Pending Prescriptions Disp Refills    desvenlafaxine succinate (PRISTIQ) 50 MG TB24 extended release tablet 30 tablet 1     Sig: Take 1 tablet by mouth daily            Mariah Tatum MA

## 2024-05-29 ENCOUNTER — OFFICE VISIT (OUTPATIENT)
Dept: PRIMARY CARE CLINIC | Age: 34
End: 2024-05-29
Payer: MEDICAID

## 2024-05-29 VITALS
DIASTOLIC BLOOD PRESSURE: 88 MMHG | SYSTOLIC BLOOD PRESSURE: 130 MMHG | WEIGHT: 232 LBS | TEMPERATURE: 97.7 F | BODY MASS INDEX: 36.34 KG/M2 | HEART RATE: 78 BPM | OXYGEN SATURATION: 99 %

## 2024-05-29 DIAGNOSIS — F41.9 ANXIETY AND DEPRESSION: Primary | ICD-10-CM

## 2024-05-29 DIAGNOSIS — E66.9 CLASS 2 OBESITY WITHOUT SERIOUS COMORBIDITY WITH BODY MASS INDEX (BMI) OF 36.0 TO 36.9 IN ADULT, UNSPECIFIED OBESITY TYPE: ICD-10-CM

## 2024-05-29 DIAGNOSIS — F32.A ANXIETY AND DEPRESSION: Primary | ICD-10-CM

## 2024-05-29 PROCEDURE — 99214 OFFICE O/P EST MOD 30 MIN: CPT | Performed by: NURSE PRACTITIONER

## 2024-05-29 RX ORDER — DESVENLAFAXINE 25 MG/1
25 TABLET, EXTENDED RELEASE ORAL DAILY
Qty: 30 TABLET | Refills: 1 | Status: SHIPPED | OUTPATIENT
Start: 2024-05-29

## 2024-05-29 ASSESSMENT — PATIENT HEALTH QUESTIONNAIRE - PHQ9
10. IF YOU CHECKED OFF ANY PROBLEMS, HOW DIFFICULT HAVE THESE PROBLEMS MADE IT FOR YOU TO DO YOUR WORK, TAKE CARE OF THINGS AT HOME, OR GET ALONG WITH OTHER PEOPLE: SOMEWHAT DIFFICULT
6. FEELING BAD ABOUT YOURSELF - OR THAT YOU ARE A FAILURE OR HAVE LET YOURSELF OR YOUR FAMILY DOWN: SEVERAL DAYS
2. FEELING DOWN, DEPRESSED OR HOPELESS: NOT AT ALL
8. MOVING OR SPEAKING SO SLOWLY THAT OTHER PEOPLE COULD HAVE NOTICED. OR THE OPPOSITE, BEING SO FIGETY OR RESTLESS THAT YOU HAVE BEEN MOVING AROUND A LOT MORE THAN USUAL: NEARLY EVERY DAY
7. TROUBLE CONCENTRATING ON THINGS, SUCH AS READING THE NEWSPAPER OR WATCHING TELEVISION: NEARLY EVERY DAY
2. FEELING DOWN, DEPRESSED OR HOPELESS: NOT AT ALL
SUM OF ALL RESPONSES TO PHQ QUESTIONS 1-9: 13
1. LITTLE INTEREST OR PLEASURE IN DOING THINGS: NOT AT ALL
10. IF YOU CHECKED OFF ANY PROBLEMS, HOW DIFFICULT HAVE THESE PROBLEMS MADE IT FOR YOU TO DO YOUR WORK, TAKE CARE OF THINGS AT HOME, OR GET ALONG WITH OTHER PEOPLE: SOMEWHAT DIFFICULT
9. THOUGHTS THAT YOU WOULD BE BETTER OFF DEAD, OR OF HURTING YOURSELF: NOT AT ALL
1. LITTLE INTEREST OR PLEASURE IN DOING THINGS: NOT AT ALL
SUM OF ALL RESPONSES TO PHQ QUESTIONS 1-9: 13
3. TROUBLE FALLING OR STAYING ASLEEP: MORE THAN HALF THE DAYS
SUM OF ALL RESPONSES TO PHQ QUESTIONS 1-9: 13
9. THOUGHTS THAT YOU WOULD BE BETTER OFF DEAD, OR OF HURTING YOURSELF: NOT AT ALL
5. POOR APPETITE OR OVEREATING: MORE THAN HALF THE DAYS
4. FEELING TIRED OR HAVING LITTLE ENERGY: MORE THAN HALF THE DAYS
5. POOR APPETITE OR OVEREATING: MORE THAN HALF THE DAYS
3. TROUBLE FALLING OR STAYING ASLEEP: MORE THAN HALF THE DAYS
7. TROUBLE CONCENTRATING ON THINGS, SUCH AS READING THE NEWSPAPER OR WATCHING TELEVISION: NEARLY EVERY DAY
SUM OF ALL RESPONSES TO PHQ QUESTIONS 1-9: 13
4. FEELING TIRED OR HAVING LITTLE ENERGY: MORE THAN HALF THE DAYS
SUM OF ALL RESPONSES TO PHQ QUESTIONS 1-9: 13
8. MOVING OR SPEAKING SO SLOWLY THAT OTHER PEOPLE COULD HAVE NOTICED. OR THE OPPOSITE - BEING SO FIDGETY OR RESTLESS THAT YOU HAVE BEEN MOVING AROUND A LOT MORE THAN USUAL: NEARLY EVERY DAY
SUM OF ALL RESPONSES TO PHQ9 QUESTIONS 1 & 2: 0
6. FEELING BAD ABOUT YOURSELF - OR THAT YOU ARE A FAILURE OR HAVE LET YOURSELF OR YOUR FAMILY DOWN: SEVERAL DAYS

## 2024-05-29 NOTE — PROGRESS NOTES
Problem Relation Age of Onset    Depression Mother     Heart Attack Mother     High Blood Pressure Mother     Miscarriages / Stillbirths Mother     Other Maternal Grandmother         heart failure       Social History     Tobacco Use    Smoking status: Every Day     Current packs/day: 0.50     Average packs/day: 0.5 packs/day for 10.0 years (5.0 ttl pk-yrs)     Types: Cigarettes, E-Cigarettes    Smokeless tobacco: Never   Substance Use Topics    Alcohol use: Yes     Alcohol/week: 8.0 standard drinks of alcohol     Types: 8 Cans of beer per week     Comment: occasional      Current Outpatient Medications on File Prior to Visit   Medication Sig Dispense Refill    desvenlafaxine succinate (PRISTIQ) 50 MG TB24 extended release tablet Take 1 tablet by mouth daily 30 tablet 1    triamcinolone (KENALOG) 0.1 % cream Apply topically 2 times daily. 453.6 g 0    clobetasol (TEMOVATE) 0.05 % ointment Apply topically 2 times daily. 60 g 3    ferrous sulfate (IRON 325) 325 (65 Fe) MG tablet Take one tablet Monday, Wednesday and Friday with breakfast 12 tablet 2    albuterol sulfate HFA (VENTOLIN HFA) 108 (90 Base) MCG/ACT inhaler Inhale 2 puffs into the lungs 4 times daily as needed for Wheezing 54 g 1    loratadine-pseudoephedrine (CLARITIN-D 12HR) 5-120 MG per extended release tablet Take 1 tablet by mouth 2 times daily 15 tablet 0    Tirzepatide-Weight Management (ZEPBOUND) 2.5 MG/0.5ML SOAJ Inject 2.5 mg into the skin once a week (Patient not taking: Reported on 5/29/2024) 2 mL 0     No current facility-administered medications on file prior to visit.     Allergies   Allergen Reactions    Penicillins Anaphylaxis       Health Maintenance   Topic Date Due    Varicella vaccine (1 of 2 - 2-dose childhood series) Never done    Polio vaccine (2 of 3 - 4-dose series) 09/27/1996    Pneumococcal 0-64 years Vaccine (1 of 2 - PCV) Never done    DTaP/Tdap/Td vaccine (3 - Tdap) 08/25/2003    Hepatitis B vaccine (3 of 3 - 3-dose series)  No

## 2024-05-30 ENCOUNTER — TELEPHONE (OUTPATIENT)
Dept: PRIMARY CARE CLINIC | Age: 34
End: 2024-05-30

## 2024-05-30 ASSESSMENT — ENCOUNTER SYMPTOMS
RESPIRATORY NEGATIVE: 1
EYES NEGATIVE: 1
GASTROINTESTINAL NEGATIVE: 1
ALLERGIC/IMMUNOLOGIC NEGATIVE: 1

## 2024-05-30 NOTE — TELEPHONE ENCOUNTER
I called the patient left message about the denial on the Wegovy through cover my meds. The drug is not covered under the pharmacy benefits.    I provided the website to the patient for the savings card fr the Wegovy.

## 2024-06-07 DIAGNOSIS — F41.9 ANXIETY AND DEPRESSION: ICD-10-CM

## 2024-06-07 DIAGNOSIS — F32.A ANXIETY AND DEPRESSION: ICD-10-CM

## 2024-06-10 DIAGNOSIS — F41.9 ANXIETY AND DEPRESSION: ICD-10-CM

## 2024-06-10 DIAGNOSIS — F32.A ANXIETY AND DEPRESSION: ICD-10-CM

## 2024-06-11 RX ORDER — DESVENLAFAXINE SUCCINATE 50 MG/1
50 TABLET, EXTENDED RELEASE ORAL DAILY
Qty: 30 TABLET | Refills: 1 | OUTPATIENT
Start: 2024-06-11

## 2024-06-14 DIAGNOSIS — F41.9 ANXIETY AND DEPRESSION: ICD-10-CM

## 2024-06-14 DIAGNOSIS — F32.A ANXIETY AND DEPRESSION: ICD-10-CM

## 2024-06-14 RX ORDER — DESVENLAFAXINE SUCCINATE 50 MG/1
50 TABLET, EXTENDED RELEASE ORAL DAILY
Qty: 30 TABLET | Refills: 0 | Status: SHIPPED | OUTPATIENT
Start: 2024-06-14

## 2024-07-11 DIAGNOSIS — F32.A ANXIETY AND DEPRESSION: ICD-10-CM

## 2024-07-11 DIAGNOSIS — F41.9 ANXIETY AND DEPRESSION: ICD-10-CM

## 2024-07-15 RX ORDER — DESVENLAFAXINE SUCCINATE 50 MG/1
50 TABLET, EXTENDED RELEASE ORAL DAILY
Qty: 30 TABLET | Refills: 0 | Status: SHIPPED | OUTPATIENT
Start: 2024-07-15

## 2024-08-12 DIAGNOSIS — F32.A ANXIETY AND DEPRESSION: ICD-10-CM

## 2024-08-12 DIAGNOSIS — F41.9 ANXIETY AND DEPRESSION: ICD-10-CM

## 2024-08-12 RX ORDER — DESVENLAFAXINE SUCCINATE 50 MG/1
50 TABLET, EXTENDED RELEASE ORAL DAILY
Qty: 30 TABLET | Refills: 0 | Status: SHIPPED | OUTPATIENT
Start: 2024-08-12

## 2024-08-12 RX ORDER — DESVENLAFAXINE 25 MG/1
25 TABLET, EXTENDED RELEASE ORAL DAILY
Qty: 30 TABLET | Refills: 0 | Status: SHIPPED | OUTPATIENT
Start: 2024-08-12

## 2024-08-12 NOTE — TELEPHONE ENCOUNTER
Jessica Naima called to request a refill on her medication.      Last office visit : 5/29/2024   Next office visit : Visit date not found     Requested Prescriptions     Pending Prescriptions Disp Refills    desvenlafaxine succinate (PRISTIQ) 25 MG TB24 extended release tablet 30 tablet 1     Sig: Take 1 tablet by mouth daily Along with Pristiq 50mg to total 75mg    desvenlafaxine succinate (PRISTIQ) 50 MG TB24 extended release tablet 30 tablet 0     Sig: Take 1 tablet by mouth daily            Yamileth Kumar MA

## 2024-08-23 ENCOUNTER — TELEPHONE (OUTPATIENT)
Dept: PRIMARY CARE CLINIC | Age: 34
End: 2024-08-23

## 2024-08-23 NOTE — TELEPHONE ENCOUNTER
Patient is wanting to know if you'll send a prescription for the compounded Semaglutide injection to [facility] for her. She said she can at least by one syringe at a time. Please advise?

## 2024-08-27 ENCOUNTER — OFFICE VISIT (OUTPATIENT)
Dept: PRIMARY CARE CLINIC | Age: 34
End: 2024-08-27
Payer: MEDICAID

## 2024-08-27 VITALS
HEART RATE: 84 BPM | WEIGHT: 243 LBS | DIASTOLIC BLOOD PRESSURE: 86 MMHG | TEMPERATURE: 97.8 F | OXYGEN SATURATION: 98 % | BODY MASS INDEX: 38.06 KG/M2 | SYSTOLIC BLOOD PRESSURE: 132 MMHG

## 2024-08-27 DIAGNOSIS — D50.9 IRON DEFICIENCY ANEMIA, UNSPECIFIED IRON DEFICIENCY ANEMIA TYPE: ICD-10-CM

## 2024-08-27 DIAGNOSIS — E66.01 CLASS 2 SEVERE OBESITY DUE TO EXCESS CALORIES WITH SERIOUS COMORBIDITY AND BODY MASS INDEX (BMI) OF 38.0 TO 38.9 IN ADULT (HCC): Primary | ICD-10-CM

## 2024-08-27 DIAGNOSIS — F41.9 ANXIETY AND DEPRESSION: ICD-10-CM

## 2024-08-27 DIAGNOSIS — F32.A ANXIETY AND DEPRESSION: ICD-10-CM

## 2024-08-27 DIAGNOSIS — F17.200 TOBACCO DEPENDENCE: ICD-10-CM

## 2024-08-27 DIAGNOSIS — E66.01 CLASS 2 SEVERE OBESITY DUE TO EXCESS CALORIES WITH SERIOUS COMORBIDITY AND BODY MASS INDEX (BMI) OF 38.0 TO 38.9 IN ADULT (HCC): ICD-10-CM

## 2024-08-27 LAB
ALBUMIN SERPL-MCNC: 4.4 G/DL (ref 3.5–5.2)
ALP SERPL-CCNC: 72 U/L (ref 35–104)
ALT SERPL-CCNC: 18 U/L (ref 5–33)
ANION GAP SERPL CALCULATED.3IONS-SCNC: 12 MMOL/L (ref 7–19)
AST SERPL-CCNC: 17 U/L (ref 5–32)
BASOPHILS # BLD: 0.1 K/UL (ref 0–0.2)
BASOPHILS NFR BLD: 0.7 % (ref 0–1)
BILIRUB SERPL-MCNC: <0.2 MG/DL (ref 0.2–1.2)
BUN SERPL-MCNC: 12 MG/DL (ref 6–20)
CALCIUM SERPL-MCNC: 8.8 MG/DL (ref 8.6–10)
CHLORIDE SERPL-SCNC: 102 MMOL/L (ref 98–111)
CO2 SERPL-SCNC: 24 MMOL/L (ref 22–29)
CREAT SERPL-MCNC: 0.7 MG/DL (ref 0.5–0.9)
EOSINOPHIL # BLD: 0.2 K/UL (ref 0–0.6)
EOSINOPHIL NFR BLD: 2.2 % (ref 0–5)
ERYTHROCYTE [DISTWIDTH] IN BLOOD BY AUTOMATED COUNT: 12.8 % (ref 11.5–14.5)
GLUCOSE SERPL-MCNC: 101 MG/DL (ref 70–99)
HCT VFR BLD AUTO: 37.8 % (ref 37–47)
HGB BLD-MCNC: 12.4 G/DL (ref 12–16)
IMM GRANULOCYTES # BLD: 0 K/UL
IRON SATN MFR SERPL: 15 % (ref 14–50)
IRON SERPL-MCNC: 51 UG/DL (ref 37–145)
LYMPHOCYTES # BLD: 3.3 K/UL (ref 1.1–4.5)
LYMPHOCYTES NFR BLD: 35.3 % (ref 20–40)
MCH RBC QN AUTO: 30 PG (ref 27–31)
MCHC RBC AUTO-ENTMCNC: 32.8 G/DL (ref 33–37)
MCV RBC AUTO: 91.5 FL (ref 81–99)
MONOCYTES # BLD: 0.5 K/UL (ref 0–0.9)
MONOCYTES NFR BLD: 5.2 % (ref 0–10)
NEUTROPHILS # BLD: 5.3 K/UL (ref 1.5–7.5)
NEUTS SEG NFR BLD: 56.3 % (ref 50–65)
PLATELET # BLD AUTO: 298 K/UL (ref 130–400)
PMV BLD AUTO: 10.8 FL (ref 9.4–12.3)
POTASSIUM SERPL-SCNC: 3.3 MMOL/L (ref 3.5–5)
PROT SERPL-MCNC: 7.6 G/DL (ref 6.4–8.3)
RBC # BLD AUTO: 4.13 M/UL (ref 4.2–5.4)
SODIUM SERPL-SCNC: 138 MMOL/L (ref 136–145)
TIBC SERPL-MCNC: 342 UG/DL (ref 250–400)
TSH SERPL DL<=0.005 MIU/L-ACNC: 0.93 UIU/ML (ref 0.27–4.2)
WBC # BLD AUTO: 9.4 K/UL (ref 4.8–10.8)

## 2024-08-27 PROCEDURE — 99214 OFFICE O/P EST MOD 30 MIN: CPT | Performed by: NURSE PRACTITIONER

## 2024-08-27 ASSESSMENT — ENCOUNTER SYMPTOMS
GASTROINTESTINAL NEGATIVE: 1
ALLERGIC/IMMUNOLOGIC NEGATIVE: 1
RESPIRATORY NEGATIVE: 1
EYES NEGATIVE: 1

## 2024-08-27 ASSESSMENT — ANXIETY QUESTIONNAIRES
7. FEELING AFRAID AS IF SOMETHING AWFUL MIGHT HAPPEN: NOT AT ALL
3. WORRYING TOO MUCH ABOUT DIFFERENT THINGS: SEVERAL DAYS
IF YOU CHECKED OFF ANY PROBLEMS ON THIS QUESTIONNAIRE, HOW DIFFICULT HAVE THESE PROBLEMS MADE IT FOR YOU TO DO YOUR WORK, TAKE CARE OF THINGS AT HOME, OR GET ALONG WITH OTHER PEOPLE: SOMEWHAT DIFFICULT
5. BEING SO RESTLESS THAT IT IS HARD TO SIT STILL: SEVERAL DAYS
6. BECOMING EASILY ANNOYED OR IRRITABLE: SEVERAL DAYS
4. TROUBLE RELAXING: NOT AT ALL
2. NOT BEING ABLE TO STOP OR CONTROL WORRYING: NOT AT ALL
GAD7 TOTAL SCORE: 4
1. FEELING NERVOUS, ANXIOUS, OR ON EDGE: SEVERAL DAYS

## 2024-08-27 NOTE — PROGRESS NOTES
[facility]  [address]  [address]  [place] [address]  Dept: [phone]  Dept Fax: [phone]  Loc: [phone]    Jessica Mcnamara is a 34 y.o. female who presents today for her medical conditions/complaints as noted below.  Jessica Mcnamara is c/o of Follow-up, Weight Management, and Anxiety        HPI:   She presents for follow up on anxiety.  She is taking Pristiq 75mg daily.  She states the Pristiq is working well for her.     She also is wanting to try compound Semaglutide at [facility].  She states the insurance would not pay for Zepbound or Wegovy injections.  She mentions that she has cut out sodas and made adjustment in her diet and still not losing weight.   HPI   Chief Complaint   Patient presents with    Follow-up    Weight Management    Anxiety     Past Medical History:   Diagnosis Date    Anxiety     Class 2 severe obesity due to excess calories with serious comorbidity and body mass index (BMI) of 35.0 to 35.9 in adult ([facility]) 2023    Depression, acute 2019    Hypertension     Tobacco dependence 5/3/2022      Past Surgical History:   Procedure Laterality Date    APPENDECTOMY       SECTION      CHOLECYSTECTOMY      CHOLECYSTECTOMY, LAPAROSCOPIC N/A 2022    ROBOTIC CHOLECYSTECTOMY WITH ICG performed by Marta Shay MD at [facility] OR    TUBAL LIGATION             2024     1:31 PM 2024     3:05 PM 2024     5:16 PM 2024     4:34 PM 2024     5:03 PM 2023     8:30 AM   Vitals   SYSTOLIC 132 130 160 180 155 100   DIASTOLIC 86 88 84 90 90 82   Pulse 84 78  82 58 77   Temp 97.8 °F (36.6 °C) 97.7 °F (36.5 °C)  98.2 °F (36.8 °C) 98.2 °F (36.8 °C) 98.1 °F (36.7 °C)   Respirations    22 16    SpO2 98 % 99 %  99 % 100 % 99 %   Weight - Scale 243 lb 232 lb  222 lb 6.4 oz  232 lb       Family History   Problem Relation Age of Onset    Depression Mother     Heart Attack Mother     High Blood Pressure  2023-24 season) [birth year]/01/2052 (Originally 9/1/2023)    Depression Monitoring  05/29/2025    Hepatitis C screen  Completed    HIV screen  Completed    Hepatitis A vaccine  Aged Out    Hib vaccine  Aged Out    HPV vaccine  Aged Out    Meningococcal (ACWY) vaccine  Aged Out       Subjective   SUBJECTIVE/OBJECTIVE:  @HPI@    Review of Systems   Constitutional:  Positive for unexpected weight change.   HENT: Negative.     Eyes: Negative.    Respiratory: Negative.     Cardiovascular: Negative.    Gastrointestinal: Negative.    Endocrine: Negative.    Genitourinary: Negative.    Musculoskeletal: Negative.    Skin: Negative.    Allergic/Immunologic: Negative.    Neurological: Negative.    Hematological: Negative.    Psychiatric/Behavioral:  The patient is nervous/anxious.           Objective   Physical Exam  Vitals and nursing note reviewed.   Constitutional:       Appearance: Normal appearance. She is obese.   HENT:      Head: Normocephalic.      Nose: Nose normal.   Cardiovascular:      Rate and Rhythm: Normal rate and regular rhythm.      Pulses: Normal pulses.      Heart sounds: Normal heart sounds.   Pulmonary:      Effort: Pulmonary effort is normal.      Breath sounds: Normal breath sounds. No wheezing or rhonchi.   Abdominal:      General: Abdomen is flat. Bowel sounds are normal.      Palpations: Abdomen is soft.      Tenderness: There is no abdominal tenderness.   Musculoskeletal:         General: Normal range of motion.      Cervical back: Normal range of motion.   Skin:     General: Skin is warm and dry.   Neurological:      Mental Status: She is alert and oriented to person, place, and time.   Psychiatric:         Mood and Affect: Mood normal.         Behavior: Behavior normal.            ASSESSMENT/PLAN:  1. Class 2 severe obesity due to excess calories with serious comorbidity and body mass index (BMI) of 38.0 to 38.9 in adult (HCC)  -     TSH; Future  2. Anxiety and depression  -     TSH; Future  -

## 2024-08-28 RX ORDER — DESVENLAFAXINE 25 MG/1
25 TABLET, EXTENDED RELEASE ORAL DAILY
Qty: 90 TABLET | Refills: 0 | Status: SHIPPED | OUTPATIENT
Start: 2024-08-28

## 2024-08-28 RX ORDER — DESVENLAFAXINE 50 MG/1
50 TABLET, FILM COATED, EXTENDED RELEASE ORAL DAILY
Qty: 90 TABLET | Refills: 0 | Status: SHIPPED | OUTPATIENT
Start: 2024-08-28

## 2024-09-29 SDOH — ECONOMIC STABILITY: FOOD INSECURITY: WITHIN THE PAST 12 MONTHS, THE FOOD YOU BOUGHT JUST DIDN'T LAST AND YOU DIDN'T HAVE MONEY TO GET MORE.: PATIENT DECLINED

## 2024-09-29 SDOH — ECONOMIC STABILITY: INCOME INSECURITY: HOW HARD IS IT FOR YOU TO PAY FOR THE VERY BASICS LIKE FOOD, HOUSING, MEDICAL CARE, AND HEATING?: SOMEWHAT HARD

## 2024-09-29 SDOH — ECONOMIC STABILITY: FOOD INSECURITY: WITHIN THE PAST 12 MONTHS, YOU WORRIED THAT YOUR FOOD WOULD RUN OUT BEFORE YOU GOT MONEY TO BUY MORE.: PATIENT DECLINED

## 2024-09-29 SDOH — ECONOMIC STABILITY: TRANSPORTATION INSECURITY
IN THE PAST 12 MONTHS, HAS LACK OF TRANSPORTATION KEPT YOU FROM MEETINGS, WORK, OR FROM GETTING THINGS NEEDED FOR DAILY LIVING?: YES

## 2024-09-30 ENCOUNTER — OFFICE VISIT (OUTPATIENT)
Dept: PRIMARY CARE CLINIC | Age: 34
End: 2024-09-30
Payer: MEDICAID

## 2024-09-30 VITALS
HEIGHT: 67 IN | WEIGHT: 237 LBS | DIASTOLIC BLOOD PRESSURE: 88 MMHG | SYSTOLIC BLOOD PRESSURE: 138 MMHG | OXYGEN SATURATION: 99 % | BODY MASS INDEX: 37.2 KG/M2 | HEART RATE: 89 BPM | TEMPERATURE: 99 F

## 2024-09-30 DIAGNOSIS — F32.A ANXIETY AND DEPRESSION: ICD-10-CM

## 2024-09-30 DIAGNOSIS — E66.09 CLASS 2 OBESITY DUE TO EXCESS CALORIES WITHOUT SERIOUS COMORBIDITY WITH BODY MASS INDEX (BMI) OF 37.0 TO 37.9 IN ADULT: Primary | ICD-10-CM

## 2024-09-30 DIAGNOSIS — F41.9 ANXIETY AND DEPRESSION: ICD-10-CM

## 2024-09-30 DIAGNOSIS — E66.812 CLASS 2 OBESITY DUE TO EXCESS CALORIES WITHOUT SERIOUS COMORBIDITY WITH BODY MASS INDEX (BMI) OF 37.0 TO 37.9 IN ADULT: Primary | ICD-10-CM

## 2024-09-30 PROCEDURE — 99214 OFFICE O/P EST MOD 30 MIN: CPT | Performed by: NURSE PRACTITIONER

## 2024-09-30 RX ORDER — SEMAGLUTIDE 0.5 MG/.5ML
0.5 INJECTION, SOLUTION SUBCUTANEOUS
Qty: 2 ML | Refills: 0 | OUTPATIENT
Start: 2024-09-30 | End: 2024-10-30

## 2024-09-30 SDOH — ECONOMIC STABILITY: FOOD INSECURITY: WITHIN THE PAST 12 MONTHS, THE FOOD YOU BOUGHT JUST DIDN'T LAST AND YOU DIDN'T HAVE MONEY TO GET MORE.: OFTEN TRUE

## 2024-09-30 SDOH — ECONOMIC STABILITY: FOOD INSECURITY: WITHIN THE PAST 12 MONTHS, YOU WORRIED THAT YOUR FOOD WOULD RUN OUT BEFORE YOU GOT MONEY TO BUY MORE.: OFTEN TRUE

## 2024-09-30 SDOH — ECONOMIC STABILITY: INCOME INSECURITY: HOW HARD IS IT FOR YOU TO PAY FOR THE VERY BASICS LIKE FOOD, HOUSING, MEDICAL CARE, AND HEATING?: HARD

## 2024-09-30 ASSESSMENT — ENCOUNTER SYMPTOMS
RESPIRATORY NEGATIVE: 1
ALLERGIC/IMMUNOLOGIC NEGATIVE: 1
GASTROINTESTINAL NEGATIVE: 1
EYES NEGATIVE: 1

## 2024-09-30 NOTE — PROGRESS NOTES
[phone]    [facility] ([facility]) provides clean, safe, and reliable public transportation at no or low cost for patients who are eligible for [place] Medicaid. Medicaid eligible clients without vehicles are provided transportation to approved Medicaid appointments without cost. You will need to schedule your trip at 72 hours in advance.   If you are DENIED ITS transportation and need more information on this, please contact the Office of Transportation Delivery [phone]     Transportation Assistance through Health Plans  To better understand if your health insurance offers transportation assistance, you can contact a  for your plan by calling the Customer Service number on the back of your insurance card. Ask the Representative if your health care plan provides transportation assistance to health care appointments.    [place] Managed-Care Organization (O) Medicaid Recipients:  The Commonwealth offers free transportation to its O Medicaid Recipients who are qualified to ride. transportation is only for Health Care Appointments, Testing, Procedures, and/or Hospital/ER discharge. Service or treatment patient will be receiving has to be billable to [place] Medicaid or transportation is not provided.   [place] AREA - [facility], [place], [facility], [place], and [facility] (aka [facility] Community Services) [phone] or [phone] Monday - Friday 8 AM - 4:30 PM Saturday 8 AM - 1 PM  [facility], [place], and [facility] (PACS) [phone] Monday - Friday 8 AM - 4:30 PM Saturday 8 AM - 1 PM    Medicare Advantage Beneficiaries:   If you have a Medicare Advantage Health Care plan, you can contact a  for your Medicare Advantage plan by calling the

## 2024-09-30 NOTE — PATIENT INSTRUCTIONS
doctor’s appointments, dialysis appointments, hospital stays, adult  and more. We treat every client like family and not like a number. We have wheelchair accessible vans for those who need wheelchair transportation. We also transport to hospitals and doctor's offices in [facility] and [place]. We accept Medicaid and private pay clients. (We are hoping to accept Medicare clients soon) Please give us a call for more information.    [facility]  [email or web address]/  [phone]  Email:  [email or web address]  Mailing address:  [employer], [address] [place] [address]    Membership-based service that can help with transportation amongst other support services.  Simply print the application from their website and send it back in via email or traditional mail. Can apply for membership if you are a: [facility] resident and are 55 years of age or older -or- are an adult living in [facility] with a disability (even a temporary one). Membership fee runs $360 per calendar year for one individual or $600 for a household. [employer] has limited memberships which help support those with short-terms needs- which helps to support transportation needs for those who cannot drive due to surgery and/or medication use. Contact them for details.     Road to Recovery by [employer]   https://www.cancer.org/support-programs-and-services/road-to-recovery.html  1-813.124.6217    The [employer] Road To Recovery program provides free ground transportation to and from cancer-related medical appointments for people with cancer who do not have a ride or are unable to drive themselves. Depending on needs and what’s available in their area, we may be able to coordinate a ride with an [employer] volunteer . *MUST HAVE A CANCER DIAGNOSIS TO QUALIFY*    [facility]

## 2024-10-02 ENCOUNTER — OFFICE VISIT (OUTPATIENT)
Age: 34
End: 2024-10-02
Payer: COMMERCIAL

## 2024-10-02 VITALS
BODY MASS INDEX: 37.42 KG/M2 | WEIGHT: 238.4 LBS | DIASTOLIC BLOOD PRESSURE: 98 MMHG | SYSTOLIC BLOOD PRESSURE: 148 MMHG | HEIGHT: 67 IN

## 2024-10-02 DIAGNOSIS — Z12.4 ENCOUNTER FOR SCREENING FOR CERVICAL CANCER: Primary | ICD-10-CM

## 2024-10-02 DIAGNOSIS — Z11.3 SCREENING EXAMINATION FOR STD (SEXUALLY TRANSMITTED DISEASE): ICD-10-CM

## 2024-10-02 DIAGNOSIS — Z00.00 ENCOUNTER FOR ANNUAL PHYSICAL EXAMINATION EXCLUDING GYNECOLOGICAL EXAMINATION IN A PATIENT OLDER THAN 17 YEARS: ICD-10-CM

## 2024-10-02 PROCEDURE — 87624 HPV HI-RISK TYP POOLED RSLT: CPT | Performed by: OBSTETRICS & GYNECOLOGY

## 2024-10-02 PROCEDURE — 87661 TRICHOMONAS VAGINALIS AMPLIF: CPT | Performed by: OBSTETRICS & GYNECOLOGY

## 2024-10-02 PROCEDURE — 87491 CHLMYD TRACH DNA AMP PROBE: CPT | Performed by: OBSTETRICS & GYNECOLOGY

## 2024-10-02 PROCEDURE — 87591 N.GONORRHOEAE DNA AMP PROB: CPT | Performed by: OBSTETRICS & GYNECOLOGY

## 2024-10-02 PROCEDURE — 87625 HPV TYPES 16 & 18 ONLY: CPT | Performed by: OBSTETRICS & GYNECOLOGY

## 2024-10-02 RX ORDER — DESVENLAFAXINE SUCCINATE 25 MG/1
25 TABLET, EXTENDED RELEASE ORAL DAILY
COMMUNITY

## 2024-10-02 RX ORDER — SEMAGLUTIDE 0.25 MG/.5ML
INJECTION, SOLUTION SUBCUTANEOUS
COMMUNITY

## 2024-10-02 RX ORDER — TRIAMCINOLONE ACETONIDE 1 MG/G
1 CREAM TOPICAL 2 TIMES DAILY
COMMUNITY

## 2024-10-02 RX ORDER — CLOBETASOL PROPIONATE 0.5 MG/G
OINTMENT TOPICAL
COMMUNITY

## 2024-10-02 RX ORDER — DESVENLAFAXINE SUCCINATE 50 MG
50 TABLET, EXTENDED RELEASE 24 HR ORAL DAILY
COMMUNITY

## 2024-10-07 LAB
C TRACH RRNA CVX QL NAA+PROBE: NOT DETECTED
GEN CATEG CVX/VAG CYTO-IMP: ABNORMAL
HPV I/H RISK 4 DNA CVX QL PROBE+SIG AMP: DETECTED
HPV16 DNA SPEC QL NAA+PROBE: DETECTED
HPV18+45 E6+E7 MRNA CVX QL NAA+PROBE: NOT DETECTED
LAB AP CASE REPORT: ABNORMAL
LAB AP GYN ADDITIONAL INFORMATION: ABNORMAL
LAB AP GYN OTHER FINDINGS: ABNORMAL
Lab: ABNORMAL
N GONORRHOEA RRNA SPEC QL NAA+PROBE: NOT DETECTED
PATH INTERP SPEC-IMP: ABNORMAL
STAT OF ADQ CVX/VAG CYTO-IMP: ABNORMAL
TRICHOMONAS VAGINALIS PCR: NOT DETECTED

## 2024-10-07 RX ORDER — METRONIDAZOLE 500 MG/1
500 TABLET ORAL 2 TIMES DAILY
Qty: 14 TABLET | Refills: 0 | Status: SHIPPED | OUTPATIENT
Start: 2024-10-07 | End: 2024-10-14

## 2024-10-07 NOTE — TELEPHONE ENCOUNTER
----- Message from Yumi Mclaughlin sent at 10/7/2024 10:04 AM CDT -----  LGSIL with positive 16, colposcopy recommended

## 2024-10-07 NOTE — TELEPHONE ENCOUNTER
Pt notified of results, scheduled for colpo:     Pap smear also with BV. Flagyl 500 mg po bid for 7 days

## 2024-10-09 ENCOUNTER — TELEPHONE (OUTPATIENT)
Dept: PRIMARY CARE CLINIC | Age: 34
End: 2024-10-09

## 2024-10-11 ENCOUNTER — OFFICE VISIT (OUTPATIENT)
Age: 34
End: 2024-10-11

## 2024-10-11 VITALS
OXYGEN SATURATION: 96 % | TEMPERATURE: 97.8 F | WEIGHT: 236 LBS | DIASTOLIC BLOOD PRESSURE: 76 MMHG | HEART RATE: 72 BPM | RESPIRATION RATE: 20 BRPM | SYSTOLIC BLOOD PRESSURE: 112 MMHG | BODY MASS INDEX: 36.96 KG/M2

## 2024-10-11 DIAGNOSIS — K52.9 ACUTE GASTROENTERITIS: Primary | ICD-10-CM

## 2024-10-11 DIAGNOSIS — R11.2 NAUSEA AND VOMITING, UNSPECIFIED VOMITING TYPE: ICD-10-CM

## 2024-10-11 DIAGNOSIS — R19.7 DIARRHEA, UNSPECIFIED TYPE: ICD-10-CM

## 2024-10-11 RX ORDER — ONDANSETRON 4 MG/1
4 TABLET, ORALLY DISINTEGRATING ORAL 3 TIMES DAILY PRN
Qty: 21 TABLET | Refills: 0 | Status: SHIPPED | OUTPATIENT
Start: 2024-10-11

## 2024-10-11 ASSESSMENT — ENCOUNTER SYMPTOMS
SHORTNESS OF BREATH: 0
DIARRHEA: 1
ABDOMINAL DISTENTION: 0
CHEST TIGHTNESS: 0
CHOKING: 0
CONSTIPATION: 0
STRIDOR: 0
ABDOMINAL PAIN: 1
CRAMPS: 1
NAUSEA: 1
EYE DISCHARGE: 0
SINUS PAIN: 0
EYE REDNESS: 0
EYE PAIN: 0
VOMITING: 1
FACIAL SWELLING: 0
COUGH: 0
APNEA: 0
COLOR CHANGE: 0
TROUBLE SWALLOWING: 0
SINUS PRESSURE: 0
WHEEZING: 0
SORE THROAT: 0

## 2024-10-11 NOTE — PROGRESS NOTES
prescribed.     Encouraged fluids, bland foods (such as dry toast, plain baked potato, chicken noodle soup), rest, and monitor for signs/symptoms of dehydration.     Advised patient that etiology is likely viral and symptoms may last 2-3 days.     If not improving in 2-3 days, follow up with PCP.     Report to ER if symptoms worsening or become severe.     Work note given for today and tomorrow    Any condition can change, despite proper treatment. Therefore, if symptoms still persist or worsen after treatment plan intitated today, either go to the nearest ER, or call PCP, or return to UC for further evaluation.    Urgent Care evaluation today is not a substitute for PCP visit. Follow up care is your responsibility to discuss and review this UC visit.        Electronically signed by GISELLE Palencia CNP on 10/11/2024 at 3:35 PM      EMR Dragon/translation disclaimer: Much of this encounter note is an electronic transcription/translation of spoken language to printed text.  The electronic translation of spoken language may be erroneous, or at times, nonsensical words or phrases may be inadvertently transcribed.  Although I have reviewed the note for such errors, some may still exist.

## 2024-10-11 NOTE — PATIENT INSTRUCTIONS
Zofran ODT as needed for nausea and vomiting prescribed.     Encouraged fluids, bland foods (such as dry toast, plain baked potato, chicken noodle soup), rest, and monitor for signs/symptoms of dehydration.     Advised patient that etiology is likely viral and symptoms may last 2-3 days.     If not improving in 2-3 days, follow up with PCP.     Report to ER if symptoms worsening or become severe.     Work note given for today and tomorrow    Any condition can change, despite proper treatment. Therefore, if symptoms still persist or worsen after treatment plan intitated today, either go to the nearest ER, or call PCP, or return to UC for further evaluation.    Urgent Care evaluation today is not a substitute for PCP visit. Follow up care is your responsibility to discuss and review this UC visit.

## 2024-10-18 NOTE — PROGRESS NOTES
"Subjective     Allyssa Holden is a 34 y.o. female here for annual exam. No complaints. Does want STD screening. Is sexually active.  Denies vaginal discharge, dyspareunia, pelvic pain. Reports menses are regular with normal flow. Tubal ligation for contraception.  Denies breast changes. Denies urinary symptoms.      Gynecologic Exam  The patient's pertinent negatives include no pelvic pain or vaginal discharge. Pertinent negatives include no dysuria.         /98   Ht 170.2 cm (67.01\")   Wt 108 kg (238 lb 6.4 oz)   LMP 09/15/2024 (Exact Date)   BMI 37.33 kg/m²     Outpatient Encounter Medications as of 10/2/2024   Medication Sig Dispense Refill    clobetasol (TEMOVATE) 0.05 % ointment       Pristiq 25 MG tablet sustained-release 24 hour Take 1 tablet by mouth Daily.      Pristiq 50 MG 24 hr tablet Take 1 tablet by mouth Daily.      Semaglutide-Weight Management (Wegovy) 0.25 MG/0.5ML solution auto-injector       triamcinolone (KENALOG) 0.1 % cream Apply 1 Application topically to the appropriate area as directed 2 (Two) Times a Day.      [DISCONTINUED] amphetamine-dextroamphetamine (ADDERALL) 10 MG tablet Take 10 mg by mouth Daily.      [DISCONTINUED] chlorhexidine (PERIDEX) 0.12 % solution Apply 15 mL to the mouth or throat 4 (Four) Times a Day. 118 mL 0    [DISCONTINUED] cyclobenzaprine (FLEXERIL) 10 MG tablet Take 1 tablet by mouth 3 (Three) Times a Day As Needed for Muscle Spasms. 30 tablet 0    [DISCONTINUED] FLUoxetine (PROzac) 20 MG capsule 40 mg.      [DISCONTINUED] LamoTRIgine (LAMICTAL PO) Take  by mouth.      [DISCONTINUED] naproxen (NAPROSYN) 500 MG tablet Take 1 tablet by mouth 2 (Two) Times a Day As Needed for Mild Pain. 10 tablet 0    [DISCONTINUED] omeprazole (priLOSEC) 40 MG capsule Take 1 capsule by mouth Daily. 30 capsule 0    [DISCONTINUED] Unable to find 1 each 1 (One) Time. BLOOD PRESSURE MEDICATION THAT SHE TAKES AT NIGHT   Prazosin??       No facility-administered encounter " medications on file as of 10/2/2024.       Surgical History  Past Surgical History:   Procedure Laterality Date    APPENDECTOMY       SECTION      x2    CHOLECYSTECTOMY         Family History  Family History   Problem Relation Age of Onset    No Known Problems Father     Hypertension Mother     Diabetes Mother     Mental illness Mother     No Known Problems Brother     No Known Problems Sister     No Known Problems Son     No Known Problems Daughter     No Known Problems Paternal Grandfather     Congenital heart disease Paternal Grandmother     No Known Problems Maternal Grandmother     No Known Problems Maternal Grandfather     Breast cancer Maternal Aunt     No Known Problems Cousin     Rheum arthritis Neg Hx     Osteoarthritis Neg Hx     Asthma Neg Hx     Heart failure Neg Hx     Hyperlipidemia Neg Hx     Migraines Neg Hx     Rashes / Skin problems Neg Hx     Seizures Neg Hx     Stroke Neg Hx     Thyroid disease Neg Hx     Ovarian cancer Neg Hx     Uterine cancer Neg Hx     Colon cancer Neg Hx     Melanoma Neg Hx     Prostate cancer Neg Hx        The following portions of the patient's history were reviewed and updated as appropriate: allergies, current medications, past family history, past medical history, past social history, past surgical history, and problem list.    Review of Systems   Genitourinary:  Negative for breast discharge, breast lump, breast pain, dyspareunia, dysuria, menstrual problem, pelvic pain, pelvic pressure, urinary incontinence, vaginal bleeding, vaginal discharge and vaginal pain.       Objective   Physical Exam  Exam conducted with a chaperone present.   Constitutional:       Appearance: Normal appearance.   Cardiovascular:      Rate and Rhythm: Normal rate and regular rhythm.      Pulses: Normal pulses.   Pulmonary:      Effort: Pulmonary effort is normal.      Breath sounds: Normal breath sounds.   Chest:   Breasts:     Right: Normal. No inverted nipple, mass, nipple  discharge or skin change.      Left: Normal. No inverted nipple, mass, nipple discharge or skin change.   Abdominal:      General: Abdomen is flat. Bowel sounds are normal.      Palpations: Abdomen is soft.   Genitourinary:     Comments: Normal external genitalia, vaginal mucosa pink without lesions, cervix smooth without lesions, no prolapse, bimanual exam with anteverted uterus, normal size, no masses, no CMT, nontender, no vaginal discharge  Musculoskeletal:      Cervical back: Normal range of motion and neck supple.   Neurological:      Mental Status: She is alert.   Psychiatric:         Mood and Affect: Mood normal.         Behavior: Behavior normal.         Assessment & Plan   Diagnoses and all orders for this visit:    1. Encounter for screening for cervical cancer (Primary)  -     Liquid-based Pap Smear, Screening  -     Trichomonas vaginalis, PCR - ThinPrep Vial, Cervix, Endocervix  -     HPV DNA Probe, Direct - ThinPrep Vial, Cervix, Endocervix  -     Chlamydia trachomatis, Neisseria gonorrhoeae, PCR - ThinPrep Vial, Cervix, Endocervix  -     HPV, Aptima High 16 / 18,45    2. Screening examination for STD (sexually transmitted disease)    3. Encounter for annual physical examination excluding gynecological examination in a patient older than 17 years  35 y/o CF here for annual exam. Pap smear today with STD screening. RTC yearly. Tubal ligation for contraception.  Pt doing well.  Normal breast and pelvic exam today.     BMI Body mass index is 37.33 kg/m²..   Colonoscopy: not applicable  Mammogram: not applicable  DEXA:  not applicable  Pap smear, per ASCCP guidelines, Done today  STI screening: desires  Contraception: tubal    AVS/Patient education handout on the following as well w/discussion  Encouraged self breast awareness.  Encouraged proactive weight management and importance of maintaining a healthy weight.   Encouraged regular exercise and the importance of same, in regards to a healthy heart as  well as helping to maintain her weight and improving her mental health.                 Yumi Mclaughlin MD  10/18/2024

## 2024-10-30 ENCOUNTER — OFFICE VISIT (OUTPATIENT)
Age: 34
End: 2024-10-30
Payer: COMMERCIAL

## 2024-10-30 VITALS
BODY MASS INDEX: 36.98 KG/M2 | DIASTOLIC BLOOD PRESSURE: 86 MMHG | WEIGHT: 235.6 LBS | SYSTOLIC BLOOD PRESSURE: 132 MMHG | HEIGHT: 67 IN

## 2024-10-30 DIAGNOSIS — R87.810 CERVICAL HIGH RISK HPV (HUMAN PAPILLOMAVIRUS) TEST POSITIVE: ICD-10-CM

## 2024-10-30 DIAGNOSIS — F17.200 SMOKER: ICD-10-CM

## 2024-10-30 DIAGNOSIS — R87.612 LOW GRADE SQUAMOUS INTRAEPITH LESION ON CYTOLOGIC SMEAR CERVIX (LGSIL): Primary | ICD-10-CM

## 2024-10-30 LAB
B-HCG UR QL: NEGATIVE
EXPIRATION DATE: NORMAL
INTERNAL NEGATIVE CONTROL: NEGATIVE
INTERNAL POSITIVE CONTROL: POSITIVE
Lab: NORMAL

## 2024-10-30 RX ORDER — ONDANSETRON 4 MG/1
TABLET, ORALLY DISINTEGRATING ORAL
COMMUNITY
Start: 2024-10-11

## 2024-10-30 NOTE — PROGRESS NOTES
Colposcopy    Date of procedure:  10/30/2024    Risks and benefits discussed? yes  All questions answered? yes  Consents given by the patient  Written consent obtained? yes    Local anesthesia used:  no    Pre-op indication: LGSIL - mild dysplasia  Procedure documentation:    The cervix was initially viewed colposcopically through a green filter.  The cervix was next bathed in acetic acid.   The findings were as follows:   Urine pregnancy test is negative    The transformation zone was able to be seen adequately.    No visible lesions    Ectocervical biopsies were not performed    An ECC was performed.    Patient tolerated the procedure well.      Colposcopic Impression: Adequate colposcopy  Colposcopic findings are consistent with PAP       Plan: Will base further treatment on pathology results.  If normal, repeat Pap smear in 1 year.      This note was electronically signed.    Alejandro Cooper MD  October 30, 2024

## 2024-10-31 ENCOUNTER — OFFICE VISIT (OUTPATIENT)
Dept: PRIMARY CARE CLINIC | Age: 34
End: 2024-10-31
Payer: MEDICAID

## 2024-10-31 VITALS
WEIGHT: 237 LBS | OXYGEN SATURATION: 100 % | BODY MASS INDEX: 37.2 KG/M2 | SYSTOLIC BLOOD PRESSURE: 124 MMHG | HEIGHT: 67 IN | TEMPERATURE: 97.8 F | HEART RATE: 81 BPM | DIASTOLIC BLOOD PRESSURE: 88 MMHG

## 2024-10-31 DIAGNOSIS — H92.01 ACUTE OTALGIA, RIGHT: ICD-10-CM

## 2024-10-31 DIAGNOSIS — E66.09 CLASS 2 OBESITY DUE TO EXCESS CALORIES WITHOUT SERIOUS COMORBIDITY WITH BODY MASS INDEX (BMI) OF 37.0 TO 37.9 IN ADULT: Primary | ICD-10-CM

## 2024-10-31 DIAGNOSIS — E66.812 CLASS 2 OBESITY DUE TO EXCESS CALORIES WITHOUT SERIOUS COMORBIDITY WITH BODY MASS INDEX (BMI) OF 37.0 TO 37.9 IN ADULT: Primary | ICD-10-CM

## 2024-10-31 DIAGNOSIS — S09.91XA INJURY OF EXTERNAL EAR, INITIAL ENCOUNTER: ICD-10-CM

## 2024-10-31 PROCEDURE — 99214 OFFICE O/P EST MOD 30 MIN: CPT | Performed by: NURSE PRACTITIONER

## 2024-10-31 RX ORDER — CIPROFLOXACIN AND DEXAMETHASONE 3; 1 MG/ML; MG/ML
4 SUSPENSION/ DROPS AURICULAR (OTIC) 2 TIMES DAILY
Qty: 7.5 ML | Refills: 0 | Status: SHIPPED | OUTPATIENT
Start: 2024-10-31 | End: 2024-11-07

## 2024-10-31 RX ORDER — SEMAGLUTIDE 1 MG/.5ML
1 INJECTION, SOLUTION SUBCUTANEOUS
Qty: 2 ML | Refills: 0 | Status: SHIPPED | OUTPATIENT
Start: 2024-10-31

## 2024-10-31 RX ORDER — AZITHROMYCIN 250 MG/1
TABLET, FILM COATED ORAL
Qty: 6 TABLET | Refills: 0 | Status: SHIPPED | OUTPATIENT
Start: 2024-10-31 | End: 2024-11-10

## 2024-10-31 NOTE — PROGRESS NOTES
[facility]  [address]  [address]  [place] [address]  Dept: [phone]  Dept Fax: [phone]  Loc: [phone]    Jessica Mcnamara is a 34 y.o. female who presents today for her medical conditions/complaints as noted below.  Jessica Mcnamara is c/o of Follow-up (Follow up for weight management. Patient states that pharmacy has never called her about wegovy coming back in stock and didn't give her increased dose. ) and Ear Pain (R/s ear pain for 1 wk )        HPI:   She presents for follow up on weight loss.  She states the pharmacy has not had Wegovy in stock for the past two weeks. She would like the dose increased as she feels the 0.5mg dose, her weight is staying about the same.  Her weight is down one pound.    She reports right ear pain that started about a week ago.  She reports wearing her air pods and carrying a box on her right shoulder. She states the box shifted and hit the air pod shoving it into her ear.  HPI   Chief Complaint   Patient presents with    Follow-up     Follow up for weight management. Patient states that pharmacy has never called her about wegovy coming back in stock and didn't give her increased dose.     Ear Pain     R/s ear pain for 1 wk      Past Medical History:   Diagnosis Date    Anxiety     Class 2 severe obesity due to excess calories with serious comorbidity and body mass index (BMI) of 35.0 to 35.9 in adult 2023    Depression, acute 2019    Hypertension     Tobacco dependence 5/3/2022      Past Surgical History:   Procedure Laterality Date    APPENDECTOMY       SECTION      CHOLECYSTECTOMY      CHOLECYSTECTOMY, LAPAROSCOPIC N/A 2022    ROBOTIC CHOLECYSTECTOMY WITH ICG performed by Marta Shay MD at [facility] OR    TUBAL LIGATION             10/31/2024     8:36 AM 10/11/2024     3:23 PM 2024     1:44 PM 2024     1:31 PM 2024     3:05 PM 2024     5:16 PM   Vitals   SYSTOLIC [phone]

## 2024-11-06 DIAGNOSIS — F32.A ANXIETY AND DEPRESSION: ICD-10-CM

## 2024-11-06 DIAGNOSIS — F41.9 ANXIETY AND DEPRESSION: ICD-10-CM

## 2024-11-06 LAB
CYTO UR: NORMAL
LAB AP CASE REPORT: NORMAL
LAB AP CLINICAL INFORMATION: NORMAL
LAB AP DIAGNOSIS COMMENT: NORMAL
Lab: NORMAL
PATH REPORT.FINAL DX SPEC: NORMAL
PATH REPORT.GROSS SPEC: NORMAL

## 2024-11-06 RX ORDER — DESVENLAFAXINE 25 MG/1
25 TABLET, EXTENDED RELEASE ORAL DAILY
Qty: 90 TABLET | Refills: 0 | Status: SHIPPED | OUTPATIENT
Start: 2024-11-06

## 2024-11-06 RX ORDER — DESVENLAFAXINE 50 MG/1
50 TABLET, FILM COATED, EXTENDED RELEASE ORAL DAILY
Qty: 90 TABLET | Refills: 0 | Status: SHIPPED | OUTPATIENT
Start: 2024-11-06

## 2024-11-20 ENCOUNTER — APPOINTMENT (OUTPATIENT)
Dept: GENERAL RADIOLOGY | Facility: HOSPITAL | Age: 34
End: 2024-11-20
Payer: COMMERCIAL

## 2024-11-20 ENCOUNTER — HOSPITAL ENCOUNTER (EMERGENCY)
Facility: HOSPITAL | Age: 34
Discharge: HOME | End: 2024-11-20
Payer: COMMERCIAL

## 2024-11-20 VITALS
SYSTOLIC BLOOD PRESSURE: 149 MMHG | BODY MASS INDEX: 36.57 KG/M2 | OXYGEN SATURATION: 100 % | HEIGHT: 67 IN | RESPIRATION RATE: 24 BRPM | TEMPERATURE: 98.5 F | HEART RATE: 86 BPM | WEIGHT: 233 LBS | DIASTOLIC BLOOD PRESSURE: 93 MMHG
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
## 2024-11-20 DIAGNOSIS — S93.402A SPRAIN OF LEFT ANKLE, UNSPECIFIED LIGAMENT, INITIAL ENCOUNTER: Primary | ICD-10-CM

## 2024-11-20 PROCEDURE — 99283 EMERGENCY DEPT VISIT LOW MDM: CPT

## 2024-11-20 PROCEDURE — 73630 X-RAY EXAM OF FOOT: CPT

## 2024-11-20 PROCEDURE — 73610 X-RAY EXAM OF ANKLE: CPT

## 2024-11-20 PROCEDURE — 73590 X-RAY EXAM OF LOWER LEG: CPT

## 2024-11-20 RX ORDER — HYDROCODONE BITARTRATE AND ACETAMINOPHEN 5; 325 MG/1; MG/1
1 TABLET ORAL ONCE
Status: COMPLETED | OUTPATIENT
Start: 2024-11-20 | End: 2024-11-20

## 2024-11-20 RX ADMIN — HYDROCODONE BITARTRATE AND ACETAMINOPHEN 1 TABLET: 5; 325 TABLET ORAL at 17:51

## 2024-11-20 NOTE — Clinical Note
[facility] EMERGENCY DEPARTMENT  [address]  [facility] [address]  Phone: [phone]    Allyssa Holden was seen and treated in our emergency department on 11/20/2024.  She may return to work on 11/22/2024.         Thank you for choosing [facility].    Andrea Brunner, APRN

## 2024-11-21 NOTE — DISCHARGE INSTRUCTIONS
It was very nice to meet you, Allyssa. Thank you for allowing us to take care of you today at [facility].    Today you were seen in the emergency department for your symptoms. Please understand that an ER evaluation is just the start of your evaluation. We do the best we can, but we are often unable to fully find what is causing your symptoms from one evaluation.  Because of this, the goal is to determine whether you need to be evaluated in the hospital or if it is safe for you to go home and see other doctors provided such as primary care physicians or specialist on an outpatient basis.     Like we discussed, I strongly urge that you follow up with your primary care doctor. Please call their office to set up an appointment as soon as possible so that you can be re-evaluated for improvement in your symptoms or for any other questions.  I have provided the primary care provider information needed, including phone number, to call to set up an appointment below in these discharge papers.     Educational material has also been provided in the following pages regarding what we have discussed today.  You may use intermittent application of ice as well as administration of Tylenol and Motrin as needed for pain relief and symptom management.    Please return to the emergency room within 12-48 hours if you experience symptoms such as the following:   Fever, chills, chest pain or shortness of breath, pain with inspiration/expiration, pain that travels to your arms, neck or back, nausea, vomiting, severe headache, tearing pain in your chest, dizziness, feel as though you are about to pass out, OR if you have any worsening symptoms, or any other concerns.    
Initiate Treatment: triamcinolone acetonide 0.1 % topical cream\\nQuantity: 454.0 g\\nSig: Apply to affected areas on arms twice a day for 7-14 days
Detail Level: Zone
Render In Strict Bullet Format?: No

## 2024-11-21 NOTE — ED PROVIDER NOTES
Subjective   History of Present Illness  Patient is a 34-year-old female that presents to the emergency department for left ankle pain.  Mother reports that about 30 minutes prior to arrival to the ED patient was chasing her son through the house when she slipped on something causing her fall resulting in rolling her left ankle.  Patient denies any other injury.  Denies hitting her head or any loss of consciousness.  Denies any neck or back pain.  Patient reports she is experiencing pain to both medial and lateral aspects of the left ankle, top of left foot and distal shin.  Patient reports she was able to bear weight on the affected extremity but reports increased pain when weightbearing.  Patient denies any numbness, tingling, or loss of sensation to the affected extremity.  Denies any chest pain or shortness of breath.  Denies any lightheadedness, dizziness, blurred vision, headache or near syncope.  Denies any urinary or neurologic changes.  Patient is tearful upon exam.      Review of Systems   Musculoskeletal:  Positive for arthralgias, gait problem, joint swelling and myalgias.   All other systems reviewed and are negative.      Past Medical History:   Diagnosis Date    Abnormal Pap smear of cervix     ADD (attention deficit disorder)     Anxiety     Depression     HSV (herpes simplex virus) infection     Hypertension     PTSD (post-traumatic stress disorder)        Allergies   Allergen Reactions    Penicillins Anaphylaxis       Past Surgical History:   Procedure Laterality Date    APPENDECTOMY       SECTION      x2    CHOLECYSTECTOMY         Family History   Problem Relation Age of Onset    No Known Problems Father     Hypertension Mother     Diabetes Mother     Mental illness Mother     No Known Problems Brother     No Known Problems Sister     No Known Problems Son     No Known Problems Daughter     No Known Problems Paternal Grandfather     Congenital heart disease Paternal Grandmother      No Known Problems Maternal Grandmother     No Known Problems Maternal Grandfather     Breast cancer Maternal Aunt     No Known Problems Cousin     Rheum arthritis Neg Hx     Osteoarthritis Neg Hx     Asthma Neg Hx     Heart failure Neg Hx     Hyperlipidemia Neg Hx     Migraines Neg Hx     Rashes / Skin problems Neg Hx     Seizures Neg Hx     Stroke Neg Hx     Thyroid disease Neg Hx     Ovarian cancer Neg Hx     Uterine cancer Neg Hx     Colon cancer Neg Hx     Melanoma Neg Hx     Prostate cancer Neg Hx        Social History     Socioeconomic History    Marital status: Single   Tobacco Use    Smoking status: Every Day     Types: Cigarettes    Smokeless tobacco: Never   Vaping Use    Vaping status: Every Day    Substances: Nicotine   Substance and Sexual Activity    Alcohol use: Yes     Comment: social    Drug use: Yes     Types: Marijuana     Comment: social    Sexual activity: Yes     Partners: Male     Birth control/protection: Tubal ligation           Objective   Physical Exam  Vitals and nursing note reviewed.   Constitutional:       Appearance: Normal appearance.      Comments: Nontoxic appearing. In no acute distress.    HENT:      Head: Normocephalic and atraumatic.      Right Ear: External ear normal.      Left Ear: External ear normal.      Nose: Nose normal.      Mouth/Throat:      Mouth: Mucous membranes are moist.      Pharynx: Oropharynx is clear.   Eyes:      Extraocular Movements: Extraocular movements intact.      Conjunctiva/sclera: Conjunctivae normal.      Pupils: Pupils are equal, round, and reactive to light.   Cardiovascular:      Rate and Rhythm: Normal rate and regular rhythm.      Pulses: Normal pulses.      Heart sounds: Normal heart sounds.   Pulmonary:      Effort: Pulmonary effort is normal. No respiratory distress.      Breath sounds: Normal breath sounds. No wheezing.   Chest:      Chest wall: No tenderness.   Abdominal:      General: Bowel sounds are normal. There is no distension.       Palpations: Abdomen is soft.      Tenderness: There is no abdominal tenderness. There is no right CVA tenderness, left CVA tenderness, guarding or rebound.   Musculoskeletal:         General: Swelling, tenderness and signs of injury present.      Cervical back: Normal range of motion and neck supple.      Right lower leg: No edema.      Left lower leg: No edema.      Comments: Patient reports increased pain upon palpation to the lateral and medial aspects of the left ankle.  There is localized swelling noted to the left ankle.  No bruising, abrasions, lacerations, or deformity noted upon exam.  Dorsalis pedis pulses were palpable, strong equal bilaterally.  Patient is able to bear weight on the affected extremity but reports increased pain with weightbearing and ambulation.  Patient appears to be neurovascular intact.   Skin:     General: Skin is warm and dry.      Capillary Refill: Capillary refill takes less than 2 seconds.   Neurological:      General: No focal deficit present.      Mental Status: She is alert and oriented to person, place, and time. Mental status is at baseline.   Psychiatric:         Mood and Affect: Mood normal.         Behavior: Behavior normal.         Thought Content: Thought content normal.         Judgment: Judgment normal.       XR Tibia Fibula 2 View Left   Final Result       No acute osseous findings.       This report was signed and finalized on 11/20/2024 6:07 PM by Dr. Jesus Moon MD.          XR Ankle 3+ View Left   Final Result       No acute osseous findings.       This report was signed and finalized on 11/20/2024 6:06 PM by Dr. Jesus Moon MD.          XR Foot 3+ View Left   Final Result       No acute osseous findings.       This report was signed and finalized on 11/20/2024 6:05 PM by Dr. Jesus Moon MD.               Procedures           ED Course                                                       Medical Decision Making  Allyssa Holden is a 34  y.o. female who presents to the ED for left ankle pain.  Mother reports that about 30 minutes prior to arrival to the ED patient was chasing her son through the house when she slipped on something causing her fall resulting in rolling her left ankle.  Patient denies any other injury.  Denies hitting her head or any loss of consciousness.  Denies any neck or back pain.  Patient reports she is experiencing pain to both medial and lateral aspects of the left ankle, top of left foot and distal shin.  Patient reports she was able to bear weight on the affected extremity but reports increased pain when weightbearing.  Patient denies any numbness, tingling, or loss of sensation to the affected extremity.  Denies any chest pain or shortness of breath.  Denies any lightheadedness, dizziness, blurred vision, headache or near syncope.  Denies any urinary or neurologic changes.  Patient is tearful upon exam.    Patient was non-toxic appearing on arrival. No acute distress was noted.  Vital signs stable.    Patient's presentation raises suspicion for differentials including, but not limited to, fracture, dislocation, sprain.    Past medical history, surgical history, and medication regimen reviewed.     Medications administered,   HYDROcodone-acetaminophen (NORCO) 5-325 MG per tablet 1 tablet (1 tablet Oral Given 11/20/24 9011)     Please refer to above section of note for imaging results that were reviewed and interpreted by radiology as well as attending physician.     Given findings described above, patient's presentation is likely consistent with left ankle sprain. I have a low suspicion for acute bony abnormality at this point in their ED course.      I had an in-depth discussion with the patient regarding x-ray imaging results.  Discussed that x-rays reveal no evidence of acute bony abnormality such as fracture or dislocation.  Discussed that due to difficulty and painful ambulation patient was placed in a walking boot and  provided with crutches.  Following walking boot placement patient remained neurovascular intact.  Discussed the importance of resting extremity, elevating the extremity with intermittent ice application and using the walking boot and crutches as needed.  Discussed that she may walk on the affected extremity as tolerated without crutches.  Encouraged administration of Tylenol and Motrin to help with pain relief as well.  Patient was educated on concerning signs and symptoms that would warrant a quick return to the ED and verbalized understanding of this. I answered all the questions regarding the emergency department evaluation, diagnosis, and treatment plan in plain and simple language that was understandable. We discussed that due to always having some diagnostic uncertainty while in the ER, there is always a chance that symptoms may change or new symptoms may reveal themselves after being discharged. Because of this, I stressed the importance of Allyssa following up with their PCP. Patient informed that appointment will need to be done by calling their office to set up an appointment within the next few days or as soon as reasonably possible so that the symptoms can be re-evaluated for improvement or for any other questions. I also gave Allyssa common sense return precautions and prompted patient to return to the emergency department within 24 - 48hrs if there are any new, worsening, or concerning symptoms. The patient verbalized understanding of the discharge instructions and agreed with them. Allyssa was discharged in stable condition.     Dragon disclaimer:  Parts of this note may be an electronic transcription/translation of spoken language to printed text using the Dragon dictation system.       Problems Addressed:  Fall, initial encounter: complicated acute illness or injury  Sprain of left ankle, unspecified ligament, initial encounter: complicated acute illness or injury    Amount and/or Complexity of Data  Reviewed  Radiology: ordered.        Final diagnoses:   Sprain of left ankle, unspecified ligament, initial encounter   Fall, initial encounter       ED Disposition  ED Disposition       ED Disposition   Discharge    Condition   Stable    Comment   --               Harika Riley, APRN  [address] [place] [address] [address]  [facility] [address]  [phone]    Schedule an appointment as soon as possible for a visit       [facility]TH[facility]  [address] [facility] [address] [phone]  Schedule an appointment as soon as possible for a visit       [facility] EMERGENCY DEPARTMENT  [address] [place] [id number] [phone]    If symptoms worsen         Medication List      No changes were made to your prescriptions during this visit.            Andrea Brunner, APRN  11/20/24 1929

## 2025-01-14 ENCOUNTER — TELEMEDICINE (OUTPATIENT)
Dept: INTERNAL MEDICINE | Age: 35
End: 2025-01-14
Payer: MEDICAID

## 2025-01-14 DIAGNOSIS — K08.89 PAIN, DENTAL: Primary | ICD-10-CM

## 2025-01-14 PROCEDURE — 99213 OFFICE O/P EST LOW 20 MIN: CPT | Performed by: NURSE PRACTITIONER

## 2025-01-14 RX ORDER — DOXYCYCLINE HYCLATE 100 MG
100 TABLET ORAL 2 TIMES DAILY
Qty: 20 TABLET | Refills: 0 | Status: SHIPPED | OUTPATIENT
Start: 2025-01-14 | End: 2025-01-24

## 2025-01-14 ASSESSMENT — ENCOUNTER SYMPTOMS
GASTROINTESTINAL NEGATIVE: 1
EYES NEGATIVE: 1
ALLERGIC/IMMUNOLOGIC NEGATIVE: 1
RESPIRATORY NEGATIVE: 1

## 2025-01-14 NOTE — PROGRESS NOTES
function) (limited exam due to video visit)          [x] No gaze palsy        [] Abnormal -          Skin:        [x] No significant exanthematous lesions or discoloration noted on facial skin         [] Abnormal -            Psychiatric:       [x] Normal Affect [] Abnormal -        [x] No Hallucinations    Other pertinent observable physical exam findings:-         On this date 1/14/2025 I have spent 10 minutes reviewing previous notes, test results and face to face (virtual) with the patient discussing the diagnosis and importance of compliance with the treatment plan as well as documenting on the day of the visit.    --GISELLE Barreto

## 2025-06-11 ENCOUNTER — APPOINTMENT (OUTPATIENT)
Dept: CT IMAGING | Facility: HOSPITAL | Age: 35
End: 2025-06-11
Payer: COMMERCIAL

## 2025-06-11 ENCOUNTER — HOSPITAL ENCOUNTER (EMERGENCY)
Facility: HOSPITAL | Age: 35
Discharge: HOME | End: 2025-06-11
Admitting: EMERGENCY MEDICINE
Payer: COMMERCIAL

## 2025-06-11 VITALS
RESPIRATION RATE: 16 BRPM | WEIGHT: 244.5 LBS | OXYGEN SATURATION: 98 % | HEIGHT: 67 IN | HEART RATE: 70 BPM | SYSTOLIC BLOOD PRESSURE: 157 MMHG | BODY MASS INDEX: 38.37 KG/M2 | TEMPERATURE: 98.6 F | DIASTOLIC BLOOD PRESSURE: 100 MMHG

## 2025-06-11 DIAGNOSIS — N83.209 RUPTURE OF OVARIAN CYST: ICD-10-CM

## 2025-06-11 DIAGNOSIS — K52.9 COLITIS WITHOUT COMPLICATION: Primary | ICD-10-CM

## 2025-06-11 LAB
ALBUMIN SERPL-MCNC: 4.3 G/DL (ref 3.5–5.2)
ALBUMIN/GLOB SERPL: 1.3 G/DL
ALP SERPL-CCNC: 67 U/L (ref 39–117)
ALT SERPL W P-5'-P-CCNC: 12 U/L (ref 1–33)
AMYLASE SERPL-CCNC: 46 U/L (ref 28–100)
ANION GAP SERPL CALCULATED.3IONS-SCNC: 14 MMOL/L (ref 5–15)
AST SERPL-CCNC: 15 U/L (ref 1–32)
BASOPHILS # BLD AUTO: 0.05 10*3/MM3 (ref 0–0.2)
BASOPHILS NFR BLD AUTO: 0.7 % (ref 0–1.5)
BILIRUB SERPL-MCNC: 0.2 MG/DL (ref 0–1.2)
BILIRUB UR QL STRIP: NEGATIVE
BUN SERPL-MCNC: 8.2 MG/DL (ref 6–20)
BUN/CREAT SERPL: 13.2 (ref 7–25)
CALCIUM SPEC-SCNC: 9.2 MG/DL (ref 8.6–10.5)
CHLORIDE SERPL-SCNC: 104 MMOL/L (ref 98–107)
CLARITY UR: CLEAR
CO2 SERPL-SCNC: 20 MMOL/L (ref 22–29)
COLOR UR: YELLOW
CREAT SERPL-MCNC: 0.62 MG/DL (ref 0.57–1)
DEPRECATED RDW RBC AUTO: 42.5 FL (ref 37–54)
EGFRCR SERPLBLD CKD-EPI 2021: 121.5 ML/MIN/1.73
EOSINOPHIL # BLD AUTO: 0.21 10*3/MM3 (ref 0–0.4)
EOSINOPHIL NFR BLD AUTO: 3 % (ref 0.3–6.2)
ERYTHROCYTE [DISTWIDTH] IN BLOOD BY AUTOMATED COUNT: 13.2 % (ref 12.3–15.4)
GLOBULIN UR ELPH-MCNC: 3.4 GM/DL
GLUCOSE SERPL-MCNC: 85 MG/DL (ref 65–99)
GLUCOSE UR STRIP-MCNC: NEGATIVE MG/DL
HCG SERPL QL: NEGATIVE
HCT VFR BLD AUTO: 37.6 % (ref 34–46.6)
HGB BLD-MCNC: 12.5 G/DL (ref 12–15.9)
HGB UR QL STRIP.AUTO: NEGATIVE
IMM GRANULOCYTES # BLD AUTO: 0.01 10*3/MM3 (ref 0–0.05)
IMM GRANULOCYTES NFR BLD AUTO: 0.1 % (ref 0–0.5)
KETONES UR QL STRIP: NEGATIVE
LEUKOCYTE ESTERASE UR QL STRIP.AUTO: NEGATIVE
LIPASE SERPL-CCNC: 18 U/L (ref 13–60)
LYMPHOCYTES # BLD AUTO: 2.43 10*3/MM3 (ref 0.7–3.1)
LYMPHOCYTES NFR BLD AUTO: 34.8 % (ref 19.6–45.3)
MCH RBC QN AUTO: 29.3 PG (ref 26.6–33)
MCHC RBC AUTO-ENTMCNC: 33.2 G/DL (ref 31.5–35.7)
MCV RBC AUTO: 88.1 FL (ref 79–97)
MONOCYTES # BLD AUTO: 0.53 10*3/MM3 (ref 0.1–0.9)
MONOCYTES NFR BLD AUTO: 7.6 % (ref 5–12)
NEUTROPHILS NFR BLD AUTO: 3.76 10*3/MM3 (ref 1.7–7)
NEUTROPHILS NFR BLD AUTO: 53.8 % (ref 42.7–76)
NITRITE UR QL STRIP: NEGATIVE
NRBC BLD AUTO-RTO: 0 /100 WBC (ref 0–0.2)
PH UR STRIP.AUTO: 7 [PH] (ref 5–8)
PLATELET # BLD AUTO: 215 10*3/MM3 (ref 140–450)
PMV BLD AUTO: 11.4 FL (ref 6–12)
POTASSIUM SERPL-SCNC: 3.8 MMOL/L (ref 3.5–5.2)
PROT SERPL-MCNC: 7.7 G/DL (ref 6–8.5)
PROT UR QL STRIP: NEGATIVE
RBC # BLD AUTO: 4.27 10*6/MM3 (ref 3.77–5.28)
SODIUM SERPL-SCNC: 138 MMOL/L (ref 136–145)
SP GR UR STRIP: 1.01 (ref 1–1.03)
UROBILINOGEN UR QL STRIP: NORMAL
WBC NRBC COR # BLD AUTO: 6.99 10*3/MM3 (ref 3.4–10.8)

## 2025-06-11 PROCEDURE — 84703 CHORIONIC GONADOTROPIN ASSAY: CPT | Performed by: STUDENT IN AN ORGANIZED HEALTH CARE EDUCATION/TRAINING PROGRAM

## 2025-06-11 PROCEDURE — 83690 ASSAY OF LIPASE: CPT | Performed by: STUDENT IN AN ORGANIZED HEALTH CARE EDUCATION/TRAINING PROGRAM

## 2025-06-11 PROCEDURE — 36415 COLL VENOUS BLD VENIPUNCTURE: CPT

## 2025-06-11 PROCEDURE — 80053 COMPREHEN METABOLIC PANEL: CPT | Performed by: STUDENT IN AN ORGANIZED HEALTH CARE EDUCATION/TRAINING PROGRAM

## 2025-06-11 PROCEDURE — 25510000001 IOPAMIDOL 61 % SOLUTION: Performed by: STUDENT IN AN ORGANIZED HEALTH CARE EDUCATION/TRAINING PROGRAM

## 2025-06-11 PROCEDURE — 85025 COMPLETE CBC W/AUTO DIFF WBC: CPT | Performed by: STUDENT IN AN ORGANIZED HEALTH CARE EDUCATION/TRAINING PROGRAM

## 2025-06-11 PROCEDURE — 82150 ASSAY OF AMYLASE: CPT | Performed by: STUDENT IN AN ORGANIZED HEALTH CARE EDUCATION/TRAINING PROGRAM

## 2025-06-11 PROCEDURE — 74177 CT ABD & PELVIS W/CONTRAST: CPT

## 2025-06-11 PROCEDURE — 99285 EMERGENCY DEPT VISIT HI MDM: CPT

## 2025-06-11 PROCEDURE — 81003 URINALYSIS AUTO W/O SCOPE: CPT | Performed by: STUDENT IN AN ORGANIZED HEALTH CARE EDUCATION/TRAINING PROGRAM

## 2025-06-11 RX ORDER — SODIUM CHLORIDE 0.9 % (FLUSH) 0.9 %
10 SYRINGE (ML) INJECTION AS NEEDED
Status: DISCONTINUED | OUTPATIENT
Start: 2025-06-11 | End: 2025-06-11 | Stop reason: HOSPADM

## 2025-06-11 RX ORDER — IOPAMIDOL 612 MG/ML
100 INJECTION, SOLUTION INTRAVASCULAR
Status: COMPLETED | OUTPATIENT
Start: 2025-06-11 | End: 2025-06-11

## 2025-06-11 RX ORDER — METRONIDAZOLE 500 MG/1
500 TABLET ORAL 2 TIMES DAILY
Qty: 20 TABLET | Refills: 0 | Status: SHIPPED | OUTPATIENT
Start: 2025-06-11

## 2025-06-11 RX ORDER — CIPROFLOXACIN 500 MG/1
500 TABLET, FILM COATED ORAL EVERY 12 HOURS
Qty: 20 TABLET | Refills: 0 | Status: SHIPPED | OUTPATIENT
Start: 2025-06-11

## 2025-06-11 RX ADMIN — IOPAMIDOL 100 ML: 612 INJECTION, SOLUTION INTRAVENOUS at 16:56

## 2025-06-11 NOTE — ED PROVIDER NOTES
Subjective   History of Present Illness  Patient states that over the last 2 days she has had intermittent but steadily worsening right upper quadrant pain that radiates into the back.  She was told by her employer to go to urgent care today, and urgent care told her to come to the emergency department after her urine was found to be negative.  Patient has a past surgical history of cholecystectomy and appendectomy, as well as  x 3.  Presently she states that the right upper quadrant pain is constant, and radiating to the right flank.  She denies nausea, vomiting, diarrhea, constipation, fever, chills, chest pain, shortness of breath, dysuria, hematuria, melena, hematochezia.  She has been utilizing ibuprofen and Tylenol for pain, but has not been effective.  She declines any medication for pain or nausea at this time.        Review of Systems   Gastrointestinal:  Positive for abdominal pain.   All other systems reviewed and are negative.      Past Medical History:   Diagnosis Date    Abnormal Pap smear of cervix     ADD (attention deficit disorder)     Anxiety     Depression     HSV (herpes simplex virus) infection     Hypertension     PTSD (post-traumatic stress disorder)        Allergies   Allergen Reactions    Penicillins Anaphylaxis       Past Surgical History:   Procedure Laterality Date    APPENDECTOMY  2009     SECTION      x2    CHOLECYSTECTOMY         Family History   Problem Relation Age of Onset    No Known Problems Father     Hypertension Mother     Diabetes Mother     Mental illness Mother     No Known Problems Brother     No Known Problems Sister     No Known Problems Son     No Known Problems Daughter     No Known Problems Paternal Grandfather     Congenital heart disease Paternal Grandmother     No Known Problems Maternal Grandmother     No Known Problems Maternal Grandfather     Breast cancer Maternal Aunt     No Known Problems Cousin     Rheum arthritis Neg Hx      Osteoarthritis Neg Hx     Asthma Neg Hx     Heart failure Neg Hx     Hyperlipidemia Neg Hx     Migraines Neg Hx     Rashes / Skin problems Neg Hx     Seizures Neg Hx     Stroke Neg Hx     Thyroid disease Neg Hx     Ovarian cancer Neg Hx     Uterine cancer Neg Hx     Colon cancer Neg Hx     Melanoma Neg Hx     Prostate cancer Neg Hx        Social History     Socioeconomic History    Marital status: Single   Tobacco Use    Smoking status: Every Day     Types: Cigarettes    Smokeless tobacco: Never   Vaping Use    Vaping status: Every Day    Substances: Nicotine   Substance and Sexual Activity    Alcohol use: Yes     Comment: social    Drug use: Yes     Types: Marijuana     Comment: social    Sexual activity: Yes     Partners: Male     Birth control/protection: Tubal ligation           Objective   Physical Exam  Vitals and nursing note reviewed.   Constitutional:       General: She is not in acute distress.     Appearance: She is well-developed. She is obese. She is not ill-appearing, toxic-appearing or diaphoretic.   HENT:      Head: Normocephalic and atraumatic.   Cardiovascular:      Rate and Rhythm: Normal rate.   Pulmonary:      Effort: Pulmonary effort is normal.   Abdominal:      General: There is distension.      Palpations: Abdomen is soft. There is no shifting dullness or fluid wave.      Tenderness: There is abdominal tenderness in the right upper quadrant. There is guarding. There is no rebound. Negative signs include Rovsing's sign and McBurney's sign.   Skin:     General: Skin is warm and dry.   Neurological:      General: No focal deficit present.      Mental Status: She is alert.   Psychiatric:         Mood and Affect: Mood normal.         Behavior: Behavior normal.         Procedures           ED Course                                                       Medical Decision Making  Patient states that over the last 2 days she has had intermittent but steadily worsening right upper quadrant pain that  radiates into the back.  She was told by her employer to go to urgent care today, and urgent care told her to come to the emergency department after her urine was found to be negative.  Patient has a past surgical history of cholecystectomy and appendectomy, as well as  x 3.  Presently she states that the right upper quadrant pain is constant, and radiating to the right flank.  She denies nausea, vomiting, diarrhea, constipation, fever, chills, chest pain, shortness of breath, dysuria, hematuria, melena, hematochezia.  She has been utilizing ibuprofen and Tylenol for pain, but has not been effective.  She declines any medication for pain or nausea at this time.    DDX: Hepatitis, colitis, gastritis, constipation, pancreatitis, pyelonephritis, kidney stone.    Labs Reviewed  COMPREHENSIVE METABOLIC PANEL - Abnormal; Notable for the following components:     CO2                           20.0 (*)            All other components within normal limits         Narrative: GFR Categories in Chronic Kidney Disease (CKD)                                              GFR Category          GFR (mL/min/1.73)    Interpretation                  G1                    90 or greater        Normal or high (1)                  G2                    60-89                Mild decrease (1)                  G3a                   45-59                Mild to moderate decrease                  G3b                   30-44                Moderate to severe decrease                  G4                    15-29                Severe decrease                  G5                    14 or less           Kidney failure                                    (1)In the absence of evidence of kidney disease, neither GFR category G1 or G2 fulfill the criteria for CKD.                                    eGFR calculation  CKD-EPI creatinine equation, which does not include race as a factor  AMYLASE - Normal  LIPASE - Normal  HCG, SERUM,  QUALITATIVE - Normal  URINALYSIS W/ MICROSCOPIC IF INDICATED (NO CULTURE) - Normal         Narrative: Urine microscopic not indicated.  CBC WITH AUTO DIFFERENTIAL - Normal  CBC AND DIFFERENTIAL    Discussed radiographic and laboratory findings with patient.  At this time, patient's laboratory work does not reflect any significant infection that would require her to be admitted.  We will discharge her with a diagnosis of colitis, and start her on antibiotics.  Patient is pleased with the plan and she will return to the ED immediately if she has new or worsening symptoms.  Discussed the case with Dr. Escobedo, who is in agreement with the plan.    Amount and/or Complexity of Data Reviewed  Labs: ordered.  Radiology: ordered.    Risk  Prescription drug management.        Final diagnoses:   Colitis without complication   Rupture of ovarian cyst       ED Disposition  ED Disposition       ED Disposition   Discharge    Condition   Stable    Comment   --               Harika Riley, APRN  [address] [place] [address] [address]  [facility] [id number] [phone]    In 1 week  As needed    [facility] EMERGENCY DEPARTMENT  [address] [place] [id number] [phone]    If symptoms worsen         Medication List        New Prescriptions      ciprofloxacin 500 MG tablet  Commonly known as: CIPRO  Take 1 tablet by mouth Every 12 (Twelve) Hours.     metroNIDAZOLE 500 MG tablet  Commonly known as: FLAGYL  Take 1 tablet by mouth 2 (Two) Times a Day.               Where to Get Your Medications        These medications were sent to [id number] - [place], [place] - [address] - [phone]  - [phone] FX  [address] [facility] [address]      Phone: [phone]   ciprofloxacin 500 MG tablet  metroNIDAZOLE 500 MG tablet            Cash Rizo PA-C  06/11/25 9553

## 2025-06-11 NOTE — Clinical Note
[facility] EMERGENCY DEPARTMENT  [address]  [facility] [address]  Phone: [phone]    Allyssa Holden was seen and treated in our emergency department on 6/11/2025.  She may return to work on 06/12/2025.         Thank you for choosing [facility].    Cash Rizo PA-C

## 2025-06-11 NOTE — DISCHARGE INSTRUCTIONS
You have been diagnosed with colitis today.  Please take the prescribed antibiotics as directed, to ensure that you heal quickly and effectively.  Please return to the emergency department immediately if you have new or worsening symptoms.  Follow-up with your primary care provider in the coming week, to ensure that you are improving.

## (undated) DEVICE — ADHESIVE SKIN CLSR 0.7ML TOP DERMBND ADV

## (undated) DEVICE — DRAPE,UTILITY,XL,4/PK,STERILE: Brand: MEDLINE

## (undated) DEVICE — SOLUTION IV IRRIG POUR BRL 0.9% SODIUM CHL 2F7124

## (undated) DEVICE — BLADE LARYNSCP HNDL MAC 3 DISP CURAVIEW LED

## (undated) DEVICE — COVER,MAYO STAND,STERILE: Brand: MEDLINE

## (undated) DEVICE — ROYAL SILK SURGICAL GOWN, XXL: Brand: CONVERTORS

## (undated) DEVICE — SUTURE MCRYL SZ 4-0 L18IN ABSRB UD L19MM PS-2 3/8 CIR PRIM Y496G

## (undated) DEVICE — BLADELESS OBTURATOR: Brand: WECK VISTA

## (undated) DEVICE — COVER LT HNDL BLU PLAS

## (undated) DEVICE — SUTURE VCRL SZ 0 L27IN ABSRB VLT L36MM UR-5 5/8 CIR J376H

## (undated) DEVICE — CANNULA SEAL

## (undated) DEVICE — NEEDLE INSUF L120MM ULT VERES ENDOPATH

## (undated) DEVICE — GENERAL LAP CDS

## (undated) DEVICE — ARM DRAPE

## (undated) DEVICE — CLIP INT L POLYMER LOK LIG HEM O LOK

## (undated) DEVICE — SOLUTION IRRIG 1000ML STRL H2O USP PLAS POUR BTL

## (undated) DEVICE — TUBE ET 7MM NSL ORAL BASIC CUF INTMED MURPHY EYE RADPQ MRK

## (undated) DEVICE — GLOVE SURG SZ 7 CRM LTX FREE POLYISOPRENE POLYMER BEAD ANTI

## (undated) DEVICE — TISSUE RETRIEVAL SYSTEM: Brand: INZII RETRIEVAL SYSTEM

## (undated) DEVICE — SOLUTION ANTIFOG VIS SYS CLEARIFY LAPSCP